# Patient Record
Sex: FEMALE | Race: WHITE | Employment: FULL TIME | URBAN - METROPOLITAN AREA
[De-identification: names, ages, dates, MRNs, and addresses within clinical notes are randomized per-mention and may not be internally consistent; named-entity substitution may affect disease eponyms.]

---

## 2015-09-21 LAB
ALT SERPL-CCNC: 31 U/L (ref 10–70)
AST SERPL-CCNC: 21 U/L (ref 14–46)
CHOLEST SERPL-MCNC: 181 MG/DL (ref 0–200)
CREAT SERPL-MCNC: 0.8 MG/DL (ref 0.6–1.3)
GLUCOSE SERPL-MCNC: 91 MG/DL (ref 70–110)
HBA1C MFR BLD: 5 % (ref 4.8–6)
HDLC SERPL-MCNC: 35 MG/DL (ref 40–60)
LDLC SERPL CALC-MCNC: 113 MG/DL (ref 88–165)
POTASSIUM SERPL-SCNC: 4.1 MMOL/L (ref 3.5–5.1)
TRIGL SERPL-MCNC: 166 MG/DL
TSH SERPL-ACNC: 2.01 UIU/ML (ref 0.47–4.68)

## 2017-01-19 ENCOUNTER — TRANSFERRED RECORDS (OUTPATIENT)
Dept: HEALTH INFORMATION MANAGEMENT | Facility: CLINIC | Age: 42
End: 2017-01-19

## 2017-09-20 ENCOUNTER — TRANSFERRED RECORDS (OUTPATIENT)
Dept: HEALTH INFORMATION MANAGEMENT | Facility: CLINIC | Age: 42
End: 2017-09-20

## 2017-09-20 LAB
HPV ABSTRACT: NORMAL
PAP-ABSTRACT: NORMAL

## 2018-05-02 ENCOUNTER — APPOINTMENT (OUTPATIENT)
Dept: GENERAL RADIOLOGY | Facility: HOSPITAL | Age: 43
End: 2018-05-02
Attending: PHYSICIAN ASSISTANT
Payer: COMMERCIAL

## 2018-05-02 ENCOUNTER — HOSPITAL ENCOUNTER (EMERGENCY)
Facility: HOSPITAL | Age: 43
Discharge: HOME | End: 2018-05-02
Attending: PHYSICIAN ASSISTANT | Admitting: PHYSICIAN ASSISTANT
Payer: COMMERCIAL

## 2018-05-02 VITALS
RESPIRATION RATE: 16 BRPM | SYSTOLIC BLOOD PRESSURE: 121 MMHG | OXYGEN SATURATION: 97 % | TEMPERATURE: 97.8 F | DIASTOLIC BLOOD PRESSURE: 84 MMHG

## 2018-05-02 DIAGNOSIS — Z72.0 TOBACCO ABUSE: ICD-10-CM

## 2018-05-02 DIAGNOSIS — R07.89 ATYPICAL CHEST PAIN: ICD-10-CM

## 2018-05-02 LAB
ALBUMIN SERPL-MCNC: 3.8 G/DL (ref 3.4–5)
ALP SERPL-CCNC: 90 U/L (ref 40–150)
ALT SERPL W P-5'-P-CCNC: 36 U/L (ref 0–50)
ANION GAP SERPL CALCULATED.3IONS-SCNC: 7 MMOL/L (ref 3–14)
AST SERPL W P-5'-P-CCNC: 20 U/L (ref 0–45)
BASOPHILS # BLD AUTO: 0.1 10E9/L (ref 0–0.2)
BASOPHILS NFR BLD AUTO: 0.9 %
BILIRUB SERPL-MCNC: 0.5 MG/DL (ref 0.2–1.3)
BUN SERPL-MCNC: 13 MG/DL (ref 7–30)
CALCIUM SERPL-MCNC: 8.7 MG/DL (ref 8.5–10.1)
CHLORIDE SERPL-SCNC: 107 MMOL/L (ref 94–109)
CO2 SERPL-SCNC: 25 MMOL/L (ref 20–32)
CREAT SERPL-MCNC: 0.72 MG/DL (ref 0.52–1.04)
D DIMER PPP DDU-MCNC: 218 NG/ML D-DU (ref 0–300)
DIFFERENTIAL METHOD BLD: NORMAL
EOSINOPHIL # BLD AUTO: 0.1 10E9/L (ref 0–0.7)
EOSINOPHIL NFR BLD AUTO: 2 %
ERYTHROCYTE [DISTWIDTH] IN BLOOD BY AUTOMATED COUNT: 13 % (ref 10–15)
GFR SERPL CREATININE-BSD FRML MDRD: 88 ML/MIN/1.7M2
GLUCOSE SERPL-MCNC: 99 MG/DL (ref 70–99)
HCT VFR BLD AUTO: 41 % (ref 35–47)
HGB BLD-MCNC: 14.1 G/DL (ref 11.7–15.7)
IMM GRANULOCYTES # BLD: 0 10E9/L (ref 0–0.4)
IMM GRANULOCYTES NFR BLD: 0.2 %
LIPASE SERPL-CCNC: 147 U/L (ref 73–393)
LYMPHOCYTES # BLD AUTO: 2.2 10E9/L (ref 0.8–5.3)
LYMPHOCYTES NFR BLD AUTO: 34.1 %
MCH RBC QN AUTO: 32.9 PG (ref 26.5–33)
MCHC RBC AUTO-ENTMCNC: 34.4 G/DL (ref 31.5–36.5)
MCV RBC AUTO: 96 FL (ref 78–100)
MONOCYTES # BLD AUTO: 0.5 10E9/L (ref 0–1.3)
MONOCYTES NFR BLD AUTO: 7.9 %
NEUTROPHILS # BLD AUTO: 3.5 10E9/L (ref 1.6–8.3)
NEUTROPHILS NFR BLD AUTO: 54.9 %
NRBC # BLD AUTO: 0 10*3/UL
NRBC BLD AUTO-RTO: 0 /100
PLATELET # BLD AUTO: 256 10E9/L (ref 150–450)
POTASSIUM SERPL-SCNC: 3.8 MMOL/L (ref 3.4–5.3)
PROT SERPL-MCNC: 7.7 G/DL (ref 6.8–8.8)
RBC # BLD AUTO: 4.29 10E12/L (ref 3.8–5.2)
SODIUM SERPL-SCNC: 139 MMOL/L (ref 133–144)
TROPONIN I SERPL-MCNC: <0.015 UG/L (ref 0–0.04)
WBC # BLD AUTO: 6.4 10E9/L (ref 4–11)

## 2018-05-02 PROCEDURE — 85025 COMPLETE CBC W/AUTO DIFF WBC: CPT | Performed by: PHYSICIAN ASSISTANT

## 2018-05-02 PROCEDURE — 25000132 ZZH RX MED GY IP 250 OP 250 PS 637: Performed by: PHYSICIAN ASSISTANT

## 2018-05-02 PROCEDURE — 85379 FIBRIN DEGRADATION QUANT: CPT | Performed by: PHYSICIAN ASSISTANT

## 2018-05-02 PROCEDURE — 99284 EMERGENCY DEPT VISIT MOD MDM: CPT | Performed by: PHYSICIAN ASSISTANT

## 2018-05-02 PROCEDURE — 71046 X-RAY EXAM CHEST 2 VIEWS: CPT | Mod: TC

## 2018-05-02 PROCEDURE — 83690 ASSAY OF LIPASE: CPT | Performed by: PHYSICIAN ASSISTANT

## 2018-05-02 PROCEDURE — 80053 COMPREHEN METABOLIC PANEL: CPT | Performed by: PHYSICIAN ASSISTANT

## 2018-05-02 PROCEDURE — 93005 ELECTROCARDIOGRAM TRACING: CPT

## 2018-05-02 PROCEDURE — 99285 EMERGENCY DEPT VISIT HI MDM: CPT | Mod: 25

## 2018-05-02 PROCEDURE — 36415 COLL VENOUS BLD VENIPUNCTURE: CPT | Performed by: PHYSICIAN ASSISTANT

## 2018-05-02 PROCEDURE — 93010 ELECTROCARDIOGRAM REPORT: CPT | Performed by: INTERNAL MEDICINE

## 2018-05-02 PROCEDURE — 25000125 ZZHC RX 250: Performed by: PHYSICIAN ASSISTANT

## 2018-05-02 PROCEDURE — 84484 ASSAY OF TROPONIN QUANT: CPT | Performed by: PHYSICIAN ASSISTANT

## 2018-05-02 RX ORDER — PHENOBARBITAL, HYOSCYAMINE SULFATE, ATROPINE SULFATE, SCOPOLAMINE HYDROBROMIDE .0194; .1037; 16.2; .0065 MG/5ML; MG/5ML; MG/5ML; MG/5ML
5 ELIXIR ORAL ONCE
Status: COMPLETED | OUTPATIENT
Start: 2018-05-02 | End: 2018-05-02

## 2018-05-02 RX ADMIN — PHENOBARBITAL, HYOSCYAMINE SULFATE, ATROPINE SULFATE, SCOPOLAMINE HYDROBROMIDE 16.2 MG: 16.2; .1037; .0194; .0065 ELIXIR ORAL at 13:10

## 2018-05-02 RX ADMIN — LIDOCAINE HYDROCHLORIDE 30 ML: 20 SOLUTION ORAL; TOPICAL at 13:10

## 2018-05-02 ASSESSMENT — ENCOUNTER SYMPTOMS
ABDOMINAL PAIN: 0
NECK PAIN: 0
BACK PAIN: 1
VOMITING: 0
CHILLS: 0
CHEST TIGHTNESS: 1
PALPITATIONS: 0
BRUISES/BLEEDS EASILY: 0
LIGHT-HEADEDNESS: 0
FATIGUE: 0
DIZZINESS: 0
NAUSEA: 0
COUGH: 0
FEVER: 0
SHORTNESS OF BREATH: 1
HEADACHES: 0

## 2018-05-02 NOTE — ED AVS SNAPSHOT
[facility]    [address] [address]    Phone:  [phone]                                       Lashae Caicedo   MRN: [id number]    Department:  [facility]   Date of Visit:  5/2/2018           Patient Information     Date Of Birth          1975        Your diagnoses for this visit were:     Atypical chest pain     Tobacco abuse        You were seen by Christy Black PA-C.      Follow-up Information     Follow up with Melyssa Lewis MD In 2 days.    Specialty:  Family Practice    Why:  at 8:45 am    Contact information:    [address]  [facility] [address] [phone]          Follow up with [facility].    Specialty:  EMERGENCY MEDICINE    Why:  If symptoms worsen    Contact information:    [address]  [facility] [address] [phone]    Additional information:    From [facility]: Take [address]. Turn left at [address]. Turn left at the first stoplight on [address]. At the first stop sign, take a right onto [address]. Take a left into the parking lot and continue through until you reach the North enterance of the building.       From [place]: Take [address]. Take the [address] ramp towards [place]. Turn left onto [address]. Take a slight right onto [address]. Turn left at the first stoplight on [address]. At the first stop sign, take a right onto [address]. Take a left into the parking lot and continue through until you reach the North enterance of the building.       From [place]: Take [address]. Take a right at [address]. At the first stop sign, take a right onto [address]. Take a left into the parking lot and continue through until you reach the North enterance of the building.         Discharge Instructions       Please follow-up with DR. Lewis on Friday 5/4 at 8:15 am here at [facility].     Stop smoking.     OTC  "pain medications as needed.     Return here for any worsening symptoms or other concerns.     Discharge References/Attachments     CHEST PAIN, UNCERTAIN CAUSE (ENGLISH)      Your next 10 appointments already scheduled     May 04, 2018  8:30 AM CDT   (Arrive by 8:15 AM)   Office Visit with Melyssa Lewis MD   [facility] ([facility] )    [address] [place] [address]   [phone]           Bring a current list of meds and any records pertaining to this visit. For Physicals, please bring immunization records and any forms needing to be filled out. Please arrive 10 minutes early to complete paperwork.                 Review of your medicines      Notice     You have not been prescribed any medications.            Procedures and tests performed during your visit     CBC with platelets differential    Comprehensive metabolic panel    D-Dimer ([facility])    EKG 12-lead, tracing only    Lipase    Peripheral IV catheter    Troponin I    XR Chest 2 Views      Orders Needing Specimen Collection     None      Pending Results     No orders found from 4/30/2018 to 5/3/2018.            Pending Culture Results     No orders found from 4/30/2018 to 5/3/2018.            Thank you for choosing [facility]       Thank you for choosing [facility] for your care. Our goal is always to provide you with excellent care. Hearing back from our patients is one way we can continue to improve our services. Please take a few minutes to complete the written survey that you may receive in the mail after you visit with us. Thank you!        [employer] Information     [facility] lets you send messages to your doctor, view your test results, renew your prescriptions, schedule appointments and more. To sign up, go to [email or web address] . Click on \"Log in\" on the left side of the screen, which will take you to the Welcome page. Then click on \"Sign up Now\" on the right side of the page.     You will be asked to enter " the access code listed below, as well as some personal information. Please follow the directions to create your username and password.     Your access code is: [email or web address]  Expires: 2018  1:53 PM     Your access code will  in 90 days. If you need help or a new code, please call your [facility] clinic or [phone].        Care EveryWhere ID     This is your Care EveryWhere ID. This could be used by other organizations to access your [facility] medical records  [id number]        Equal Access to Services     [facility]: Hadlise Mendez, madison lynch, tarah hernandezalmati pike, brissa marlow . So [facility] [phone].    ATENCIÓN: Si habla español, tiene a crystal disposición servicios gratuitos de asistencia lingüística. Llame al [phone].    We comply with applicable federal civil rights laws and [place] laws. We do not discriminate on the basis of race, color, national origin, age, disability, sex, sexual orientation, or gender identity.            After Visit Summary       This is your record. Keep this with you and show to your community pharmacist(s) and doctor(s) at your next visit.

## 2018-05-02 NOTE — ED AVS SNAPSHOT
[facility]    [address] [address]    Phone:  [phone]                                       Lashae Caicedo   MRN: [id number]    Department:  [facility]   Date of Visit:  5/2/2018           After Visit Summary Signature Page     I have received my discharge instructions, and my questions have been answered. I have discussed any challenges I see with this plan with the nurse or doctor.    ..........................................................................................................................................  Patient/Patient Representative Signature      ..........................................................................................................................................  Patient Representative Print Name and Relationship to Patient    ..................................................               ................................................  Date                                            Time    ..........................................................................................................................................  Reviewed by Signature/Title    ...................................................              ..............................................  Date                                                            Time

## 2018-05-02 NOTE — ED PROVIDER NOTES
History     Chief Complaint   Patient presents with     Chest Pain     c/o chest pain x 4 weeks, notes worse when laying down     The history is provided by the patient.     Lashae Caicedo is a 42 year old female who presented to the emergency department ambulatory for evaluation of a 4 week history of substernal chest pain.  Pain is described as sharp and dull.  His present daily, worse with lying flat, and worse with deep breath.  She does have some minimal associated dyspnea randomly with the pain as well as reported intermittent diaphoresis.  No cough.  Denies any abdominal pain.  Denies any vomiting.  She does smoke.  She has no history of coronary artery disease.  Current pain rated approximately 7 out of 10 scale. Pain radiates to the back.     Problem List:    There are no active problems to display for this patient.       Past Medical History:    No past medical history on file.    Past Surgical History:    No past surgical history on file.    Family History:    No family history on file.    Social History:  Marital Status:    Social History   Substance Use Topics     Smoking status: Not on file     Smokeless tobacco: Not on file     Alcohol use Not on file        Medications:      No current outpatient prescriptions on file.      Review of Systems   Constitutional: Negative for chills, fatigue and fever.   Respiratory: Positive for chest tightness and shortness of breath. Negative for cough.    Cardiovascular: Positive for chest pain. Negative for palpitations.   Gastrointestinal: Negative for abdominal pain, nausea and vomiting.   Genitourinary: Negative.    Musculoskeletal: Positive for back pain. Negative for neck pain.   Skin: Negative.    Neurological: Negative for dizziness, light-headedness and headaches.   Hematological: Does not bruise/bleed easily.       Physical Exam   BP: (!) 176/106  Heart Rate: 93  Temp: 97.9  F (36.6  C)  Resp: 14  SpO2: 98 %      Physical Exam   Constitutional: She is  oriented to person, place, and time. She appears well-developed and well-nourished.   Cardiovascular: Normal rate.    Pulmonary/Chest: Effort normal and breath sounds normal. No respiratory distress.   Abdominal: Soft. There is no tenderness. There is no guarding.   Musculoskeletal: She exhibits no edema.   Neurological: She is alert and oriented to person, place, and time.   Skin: Skin is warm and dry.   Psychiatric: She has a normal mood and affect.   Nursing note and vitals reviewed.      ED Course     ED Course     Procedures          EKG shows a normal sinus rhythm at a rate of 85.  Normal MT interval.  Normal QRS duration.  Normal QTC.  Normal axis.  Normal P-wave duration.  There are no ST or T-wave concerns.  She does have a very mild nonspecific RSR prime pattern in V1.  No previous EKGs.    Chest x-ray shows no focal infiltrate, pneumothorax, or widened mediastinum.    Critical Care time:  none               Results for orders placed or performed during the hospital encounter of 05/02/18 (from the past 24 hour(s))   CBC with platelets differential   Result Value Ref Range    WBC 6.4 4.0 - 11.0 10e9/L    RBC Count 4.29 3.8 - 5.2 10e12/L    Hemoglobin 14.1 11.7 - 15.7 g/dL    Hematocrit 41.0 35.0 - 47.0 %    MCV 96 78 - 100 fl    MCH 32.9 26.5 - 33.0 pg    MCHC 34.4 31.5 - 36.5 g/dL    RDW 13.0 10.0 - 15.0 %    Platelet Count 256 150 - 450 10e9/L    Diff Method Automated Method     % Neutrophils 54.9 %    % Lymphocytes 34.1 %    % Monocytes 7.9 %    % Eosinophils 2.0 %    % Basophils 0.9 %    % Immature Granulocytes 0.2 %    Nucleated RBCs 0 0 /100    Absolute Neutrophil 3.5 1.6 - 8.3 10e9/L    Absolute Lymphocytes 2.2 0.8 - 5.3 10e9/L    Absolute Monocytes 0.5 0.0 - 1.3 10e9/L    Absolute Eosinophils 0.1 0.0 - 0.7 10e9/L    Absolute Basophils 0.1 0.0 - 0.2 10e9/L    Abs Immature Granulocytes 0.0 0 - 0.4 10e9/L    Absolute Nucleated RBC 0.0    Comprehensive metabolic panel   Result Value Ref Range    Sodium  139 133 - 144 mmol/L    Potassium 3.8 3.4 - 5.3 mmol/L    Chloride 107 94 - 109 mmol/L    Carbon Dioxide 25 20 - 32 mmol/L    Anion Gap 7 3 - 14 mmol/L    Glucose 99 70 - 99 mg/dL    Urea Nitrogen 13 7 - 30 mg/dL    Creatinine 0.72 0.52 - 1.04 mg/dL    GFR Estimate 88 >60 mL/min/1.7m2    GFR Estimate If Black >90 >60 mL/min/1.7m2    Calcium 8.7 8.5 - 10.1 mg/dL    Bilirubin Total 0.5 0.2 - 1.3 mg/dL    Albumin 3.8 3.4 - 5.0 g/dL    Protein Total 7.7 6.8 - 8.8 g/dL    Alkaline Phosphatase 90 40 - 150 U/L    ALT 36 0 - 50 U/L    AST 20 0 - 45 U/L   Lipase   Result Value Ref Range    Lipase 147 73 - 393 U/L   Troponin I   Result Value Ref Range    Troponin I ES <0.015 0.000 - 0.045 ug/L   D-Dimer ([facility])   Result Value Ref Range    D-Dimer ng/mL 218 0 - 300 ng/ml D-DU   XR Chest 2 Views    Narrative    PROCEDURE:  XR CHEST 2 VW    HISTORY:  chest pain; .     COMPARISON:  None.    FINDINGS:   The cardiac silhouette is normal in size. The pulmonary vasculature is  normal.  The lungs are clear. No pleural effusion or pneumothorax.      Impression    IMPRESSION:  No acute cardiopulmonary disease.      CHEN CAMARA MD       Medications   lidocaine (viscous) (XYLOCAINE) 2 % 15 mL, alum & mag hydroxide-simethicone (MYLANTA ES/MAALOX  ES) 15 mL GI Cocktail (30 mLs Oral Given 5/2/18 1310)   atropine-PHENobarbital-scopolamine-hyoscyamine (DONNATAL) 16.2 MG/5ML solution 16.2 mg (16.2 mg Oral Given 5/2/18 1310)       HEART Score  Background  Calculates the overall risk of adverse event in patient's presenting with chest pain.  Based on 5 criteria (each assigned 0-2 points) including suspiciousness of history, EKG, age, risk factors and troponin.    Data  42 year old female   does not have a problem list on file.   has no tobacco history on file.  family history is not on file.  Lab Results   Component Value Date    TROPI <0.015 05/02/2018     Criteria   0-2 points for each of 5 items (maximum of 10 points):  Score 0- History  slightly suspicious for coronary syndrome  Score 0- EKG Normal  Score 0- Age <45 years old  Score 1- One to 2 risk factors for atherosclerotic disease  Score 0- Within normal limits for troponin levels  Interpretation  Risk of adverse outcome  Heart Score: 1  Total Score 0-3- Adverse Outcome Risk 2.5% - Supports early discharge with appropriate follow-up      Assessments & Plan (with Medical Decision Making)   Pain is quite atypical in nature.  It is been present for approximately 4 weeks.  Present on a daily basis.  Worse with lying flat.  Varies between sharp and dull.  I do not believe she has any significant associated signs or symptoms.  EKG does have a slight abnormality in V1, but I believe it is nonspecific for this patient it does not represent acute coronary syndrome.  I do not believe that her pain represents any emergent intrathoracic catastrophe at this time.  She was set up for a recheck as well as establish care in the clinic in 2 days.  I stressed the importance of returning to the emergency department for any worsening pain, pain that changes in quality location or character, appreciable dyspnea, or any other questions or concerns.  OTC pain medications.  She has what sounds to be an anaphylactoid reaction to aspirin.  Stressed smoking cessation.           I have reviewed the nursing notes.    I have reviewed the findings, diagnosis, plan and need for follow up with the patient.       New Prescriptions    No medications on file       Final diagnoses:   Atypical chest pain   Tobacco abuse       5/2/2018   [facility]     Christy Black PA-C  05/02/18 7010

## 2018-05-02 NOTE — DISCHARGE INSTRUCTIONS
Please follow-up with DR. Lewis on Friday 5/4 at 8:15 am here at [facility].     Stop smoking.     OTC pain medications as needed.     Return here for any worsening symptoms or other concerns.

## 2018-05-02 NOTE — ED NOTES
Co chest pain for 4 weeks worse with lying back sleeping.  Slight SOB hurts to breath deeply.  Pain is 7/10 and sometimes through to back.  EKG in progress

## 2018-05-04 ENCOUNTER — OFFICE VISIT (OUTPATIENT)
Dept: FAMILY MEDICINE | Facility: OTHER | Age: 43
End: 2018-05-04
Attending: FAMILY MEDICINE
Payer: COMMERCIAL

## 2018-05-04 VITALS
TEMPERATURE: 98.5 F | HEART RATE: 96 BPM | SYSTOLIC BLOOD PRESSURE: 116 MMHG | DIASTOLIC BLOOD PRESSURE: 80 MMHG | HEIGHT: 64 IN | OXYGEN SATURATION: 97 % | RESPIRATION RATE: 18 BRPM | WEIGHT: 232 LBS | BODY MASS INDEX: 39.61 KG/M2

## 2018-05-04 DIAGNOSIS — Z71.6 TOBACCO ABUSE COUNSELING: ICD-10-CM

## 2018-05-04 DIAGNOSIS — R10.13 ABDOMINAL PAIN, EPIGASTRIC: ICD-10-CM

## 2018-05-04 DIAGNOSIS — Z72.0 TOBACCO ABUSE: ICD-10-CM

## 2018-05-04 DIAGNOSIS — G43.909 MIGRAINE SYNDROME: ICD-10-CM

## 2018-05-04 DIAGNOSIS — K52.9 CHRONIC DIARRHEA: Primary | ICD-10-CM

## 2018-05-04 DIAGNOSIS — R68.81 EARLY SATIETY: ICD-10-CM

## 2018-05-04 PROBLEM — N93.9 ABNORMAL UTERINE BLEEDING: Status: ACTIVE | Noted: 2017-09-21

## 2018-05-04 PROBLEM — E66.812 OBESITY, CLASS II, BMI 35-39.9: Status: ACTIVE | Noted: 2017-03-20

## 2018-05-04 PROBLEM — Z88.1 HX OF ANTIBIOTIC ALLERGY: Status: ACTIVE | Noted: 2017-03-20

## 2018-05-04 PROBLEM — M26.609 TMJ (TEMPOROMANDIBULAR JOINT SYNDROME): Status: ACTIVE | Noted: 2017-03-20

## 2018-05-04 PROBLEM — F33.9 DEPRESSION, RECURRENT (H): Status: ACTIVE | Noted: 2017-10-25

## 2018-05-04 PROCEDURE — 99203 OFFICE O/P NEW LOW 30 MIN: CPT | Performed by: FAMILY MEDICINE

## 2018-05-04 PROCEDURE — G0463 HOSPITAL OUTPT CLINIC VISIT: HCPCS

## 2018-05-04 RX ORDER — VENLAFAXINE 37.5 MG/1
37.5 TABLET ORAL
COMMUNITY
Start: 2017-10-25 | End: 2018-06-22

## 2018-05-04 RX ORDER — PROPRANOLOL HYDROCHLORIDE 20 MG/1
20 TABLET ORAL
COMMUNITY
Start: 2017-09-12 | End: 2018-06-22

## 2018-05-04 RX ORDER — EPINEPHRINE 0.3 MG/.3ML
0.3 INJECTION SUBCUTANEOUS
COMMUNITY
Start: 2017-07-26 | End: 2018-08-10

## 2018-05-04 RX ORDER — IBUPROFEN 600 MG/1
600 TABLET, FILM COATED ORAL
COMMUNITY
Start: 2017-10-27 | End: 2018-07-26

## 2018-05-04 RX ORDER — ALBUTEROL SULFATE 90 UG/1
2 AEROSOL, METERED RESPIRATORY (INHALATION)
COMMUNITY
Start: 2017-07-26 | End: 2018-08-10

## 2018-05-04 ASSESSMENT — ANXIETY QUESTIONNAIRES
4. TROUBLE RELAXING: NEARLY EVERY DAY
3. WORRYING TOO MUCH ABOUT DIFFERENT THINGS: NEARLY EVERY DAY
IF YOU CHECKED OFF ANY PROBLEMS ON THIS QUESTIONNAIRE, HOW DIFFICULT HAVE THESE PROBLEMS MADE IT FOR YOU TO DO YOUR WORK, TAKE CARE OF THINGS AT HOME, OR GET ALONG WITH OTHER PEOPLE: SOMEWHAT DIFFICULT
2. NOT BEING ABLE TO STOP OR CONTROL WORRYING: NEARLY EVERY DAY
5. BEING SO RESTLESS THAT IT IS HARD TO SIT STILL: SEVERAL DAYS
GAD7 TOTAL SCORE: 14
6. BECOMING EASILY ANNOYED OR IRRITABLE: MORE THAN HALF THE DAYS
1. FEELING NERVOUS, ANXIOUS, OR ON EDGE: MORE THAN HALF THE DAYS
7. FEELING AFRAID AS IF SOMETHING AWFUL MIGHT HAPPEN: NOT AT ALL

## 2018-05-04 ASSESSMENT — PAIN SCALES - GENERAL: PAINLEVEL: MILD PAIN (3)

## 2018-05-04 NOTE — NURSING NOTE
"/80  Pulse 96  Temp 98.5  F (36.9  C) (Tympanic)  Resp 18  Ht 5' 4\" (1.626 m)  Wt 232 lb (105.2 kg)  SpO2 97%  BMI 39.82 kg/m2  "

## 2018-05-04 NOTE — MR AVS SNAPSHOT
After Visit Summary   5/4/2018    Lashae Caicedo    MRN: [id number]           Patient Information     Date Of Birth          1975        Visit Information        Provider Department      5/4/2018 8:30 AM Melyssa Lewis MD [facility]        Today's Diagnoses     Chronic diarrhea    -  1    Abdominal pain, epigastric        Early satiety        Migraine syndrome        Tobacco abuse counseling        Tobacco abuse          Care Instructions      HOW TO QUIT SMOKING  Smoking is one of the hardest habits to break. About half of all those who have ever smoked have been able to quit, and most of those (about 70%) who still smoke want to quit. Here are some of the best ways to stop smoking.     KEEP TRYING:  It takes most smokers about 8 tries before they are finally able to fully quit. So, the more often you try and fail, the better your chance of quitting the next time! So, don't give up!    GO COLD TURKEY:  Most ex-smokers quit cold turkey. Trying to cut back gradually doesn't seem to work as well, perhaps because it continues the smoking habit. Also, it is possible to fool yourself by inhaling more while smoking fewer cigarettes. This results in the same amount of nicotine in your body!    GET SUPPORT:  Support programs can make an important difference, especially for the heavy smoker. These groups offer lectures, methods to change your behavior and peer support. Call the free national Quitline for more information. 800-QUIT-NOW (621-005-6355). Low-cost or free programs are offered by many hospitals, local chapters of the American Lung Association (499-461-8013) and the American Cancer Society (816-946-1568). Support at home is important too. Non-smokers can help by offering praise and encouragement. If the smoker fails to quit, encourage them to try again!    OVER-THE-COUNTER MEDICINES:  For those who can't quit on their own, Nicotine Replacement Therapy (NRT) may make quitting much  easier. Certain aids such as the nicotine patch, gum and lozenge are available without a prescription. However, it is best to use these under the guidance of your doctor. The skin patch provides a steady supply of nicotine to the body. Nicotine gum and lozenge gives temporary bursts of low levels of nicotine. Both methods take the edge off the craving for cigarettes. WARNING: If you feel symptoms of nicotine overdose, such as nausea, vomiting, dizziness, weakness, or fast heartbeat, stop using these and see your doctor.    PRESCRIPTION MEDICINES:  After evaluating your smoking patterns and prior attempts at quitting, your doctor may offer a prescription medicine such as bupropion (Zyban, Wellbutrin), varenicline (Chantix, Champix), a niocotine inhaler or nasal spray. Each has its unique advantage and side effects which your doctor can review with you.    HEALTH BENEFITS OF QUITTING:  The benefits of quitting start right away and keep improving the longer you go without smokin minutes: blood pressure and pulse return to normal  8 hours: oxygen levels return to normal  2 days: ability to smell and taste begins to improve as damaged nerves start to regrow  2-3 weeks: circulation and lung function improves  1-9 months: decreased cough, congestion and shortness of breath; less tired  1 year: risk of heart attack decreases by half  5 years: risk of lung cancer decreases by half; risk of stroke becomes the same as a non-smoker  For information about how to quit smoking, visit the following links:  National Cancer [place] ,   Clearing the Air, Quit Smoking Today   - an online booklet. http://www.smokefree.gov/pubs/clearing_the_air.pdf  Smokefree.gov http://smokefree.gov/  QuitNet http://www.quitnet.com/    5647-6435 Shaun Souza, [address] [place] [address], [place] [address]. All rights reserved. This information is not intended as a substitute for professional medical care. Always follow your healthcare  professional's instructions.    The Benefits of Living Smoke Free  What do you want to gain from quitting? Check off some reasons to quit.  Health Benefits  ___ Reduce my risk of lung cancer, heart disease, chronic lung disease  ___ Have fewer wrinkles and softer skin  ___ Improve my sense of taste and smell  ___ For pregnant women--reduce the risk of having a miscarriage, stillbirth, premature birth, or low-birth-weight baby  Personal Benefits  ___ Feel more in control of my life  ___ Have better-smelling hair, breath, clothes, home, and car  ___ Save time by not having to take smoke breaks, buy cigarettes, or hunt for a light  ___ Have whiter teeth  Family Benefits  ___ Reduce my children s respiratory tract infections  ___ Set a good example for my children  ___ Reduce my family s cancer risk  Financial Benefits  ___ Save hundreds of dollars each year that would be spent on cigarettes  ___ Save money on medical bills  ___ Save on life, health, and car insurance premiums    Those Dollars Add Up!  Cigarettes are expensive, and getting more expensive all the time. Do you realize how much money you are spending on cigarettes per year? What is the average amount you spend on a pack of cigarettes? What is the average number of packs that you smoke per day? Using your answers to these questions, fill in this formula to help you find out:  ($ _____ per pack) ×  ( _____ number of packs per day) × (365 days) =  $ _____ yearly cost of smoking  Besides tobacco, there are other costs, including extra cleaning bills and replacement costs for clothing and furniture; medical expenses for smoking-related illnesses; and higher health, life, and car insurance premiums.    Cigars and Pipes Count Too!  Cigars and pipes are also dangerous. So are smokeless (chewing) tobacco and snuff. All of these products contain nicotine, a highly addictive substance that has harmful effects on your body. Quitting smoking means giving up all tobacco  "products.      8012-7738 [facility], [address] [place] [address], [place] [address]. All rights reserved. This information is not intended as a substitute for professional medical care. Always follow your healthcare professional's instructions.          Follow-ups after your visit        Your next 10 appointments already scheduled     May 18, 2018  4:30 PM CDT   (Arrive by 4:15 PM)   Office Visit with Melyssa Lewis MD   [facility] ([facility] )    [address]  [facility] [address]   [phone]           Bring a current list of meds and any records pertaining to this visit. For Physicals, please bring immunization records and any forms needing to be filled out. Please arrive 10 minutes early to complete paperwork.              Future tests that were ordered for you today     Open Future Orders        Priority Expected Expires Ordered    CT Abdomen Pelvis w Contrast Routine  5/4/2019 5/4/2018            Who to contact     If you have questions or need follow up information about today's clinic visit or your schedule please contact [facility] directly at [phone].  Normal or non-critical lab and imaging results will be communicated to you by MyChart, letter or phone within 4 business days after the clinic has received the results. If you do not hear from us within 7 days, please contact the clinic through [employer] or phone. If you have a critical or abnormal lab result, we will notify you by phone as soon as possible.  Submit refill requests through [employer] or call your pharmacy and they will forward the refill request to us. Please allow 3 business days for your refill to be completed.          Additional Information About Your Visit        MyChart Information     [employer] lets you send messages to your doctor, view your test results, renew your prescriptions, schedule appointments and more. To sign up, go to [email or web address] . Click on \"Log in\" on the " "left side of the screen, which will take you to the Welcome page. Then click on \"Sign up Now\" on the right side of the page.     You will be asked to enter the access code listed below, as well as some personal information. Please follow the directions to create your username and password.     Your access code is: [email or web address]  Expires: 2018  1:53 PM     Your access code will  in 90 days. If you need help or a new code, please call your [facility] or [phone].        Care EveryWhere ID     This is your Care EveryWhere ID. This could be used by other organizations to access your [place] medical records  [id number]        Your Vitals Were     Pulse Temperature Respirations Height Pulse Oximetry BMI (Body Mass Index)    96 98.5  F (36.9  C) (Tympanic) 18 5' 4\" (1.626 m) 97% 39.82 kg/m2       Blood Pressure from Last 3 Encounters:   18 116/80   18 121/84    Weight from Last 3 Encounters:   18 232 lb (105.2 kg)              We Performed the Following     TDAP VACCINE (ADACEL)     Tobacco Cessation - Order to Satisfy Health Maintenance          Today's Medication Changes          These changes are accurate as of 18  9:05 AM.  If you have any questions, ask your nurse or doctor.               Start taking these medicines.        Dose/Directions    omeprazole 20 MG CR capsule   Commonly known as:  priLOSEC   Used for:  Early satiety, Abdominal pain, epigastric   Started by:  Melyssa Lewis MD        Dose:  20 mg   Take 1 capsule (20 mg) by mouth 2 times daily   Quantity:  90 capsule   Refills:  0            Where to get your medicines      These medications were sent to [facility] - [place], [place] - [address]  [id number] , [place] [address]     Phone:  [phone]     omeprazole 20 MG CR capsule                Primary Care Provider Fax #    Physician No Ref-Primary [phone]       No address on file        Equal Access to Services     SHANTHI BURTON AH: Lillian aad " jason Mendez, washobhada ysabeladaha, qaybta kaerrol pike, brissa marsin hayaajaswant lechugataras jarenreremckayla marlow magdalena. So [facility] [phone].    ATENCIÓN: Si georgela carlos enrique, tiene a crystal disposición servicios gratuitos de asistencia lingüística. Elizabeth al [phone].    We comply with applicable federal civil rights laws and [place] laws. We do not discriminate on the basis of race, color, national origin, age, disability, sex, sexual orientation, or gender identity.            Thank you!     Thank you for choosing [facility]  for your care. Our goal is always to provide you with excellent care. Hearing back from our patients is one way we can continue to improve our services. Please take a few minutes to complete the written survey that you may receive in the mail after your visit with us. Thank you!             Your Updated Medication List - Protect others around you: Learn how to safely use, store and throw away your medicines at www.disposemymeds.org.          This list is accurate as of 5/4/18  9:05 AM.  Always use your most recent med list.                   Brand Name Dispense Instructions for use Diagnosis    albuterol 108 (90 Base) MCG/ACT Inhaler    PROAIR HFA/PROVENTIL HFA/VENTOLIN HFA     Inhale 2 puffs into the lungs        EPINEPHrine 0.3 MG/0.3ML injection 2-pack    EPIPEN/ADRENACLICK/or ANY BX GENERIC EQUIV     Inject 0.3 mg into the muscle        ibuprofen 600 MG tablet    ADVIL/MOTRIN     Take 600 mg by mouth        omeprazole 20 MG CR capsule    priLOSEC    90 capsule    Take 1 capsule (20 mg) by mouth 2 times daily    Early satiety, Abdominal pain, epigastric       propranolol 20 MG tablet    INDERAL     Take 20 mg by mouth        venlafaxine 37.5 MG tablet    EFFEXOR     Take 37.5 mg by mouth

## 2018-05-04 NOTE — PROGRESS NOTES
SUBJECTIVE:   Lashae Caicedo is a 42 year old female who presents to clinic today for the following health issues:      New Patient/Transfer of Care  Pt previously seen in [place], [place]. Has history of Migraines, AUB s/p ablation, depression. Recently moved to the area to be close her fiance.     Chest Pain      Onset: 4 1/2 weeks    Description (location/character/radiation/duration): dull and sharp pains varies    Intensity:  moderate    Accompanying signs and symptoms:        Shortness of breath: YES- sometimes       Sweating: no        Nausea/vomitting: no        Palpitations: no        Other (fevers/chills/cough/heartburn/lightheadedness): YES    History (similar episodes/previous evaluation): edema in hands especially.  Worse when laying down    Precipitating or alleviating factors:       Worse with exertion: no        Worse with breathing: no        Related to eating: no        Better with burping: no     Therapies tried and outcome: ER 5-2-2018 wit GI cocktail.  Relates no appetite since that visit    Pt notes epigastric pain radiating to the mid back for the last 4 weeks or so. Unclear how she first noted it. Some days is constant ache, others is intermittent. Bending or laying down occasionally make it worse and sharp. No relationship to food.  Has history of GERD and this feels very different. Taking ranitidine currently bid which helps some, but previously had prilosec which helped better. Has not had weight loss. Appetite is low, but this just started after ED visit. Has been feeling bloating with early satiety for some time. Does also have a history of chronic diarrhea. This is primarily triggered after eating. She has had colonoscopy x 2 with several polyps. She does not know when to follow these up. Last was in 2012.  No blood in stool. In ED lfts were wnl. Cardiac work up including ekg, toponin, cxr ddimer were all negative.     Problem list and histories reviewed & adjusted, as  "indicated.  Additional history: as documented    Labs reviewed in EPIC    Reviewed and updated as needed this visit by clinical staff       Reviewed and updated as needed this visit by Provider         ROS:  Constitutional, HEENT, cardiovascular, pulmonary, gi and gu systems are negative, except as otherwise noted.    OBJECTIVE:     /80  Pulse 96  Temp 98.5  F (36.9  C) (Tympanic)  Resp 18  Ht 5' 4\" (1.626 m)  Wt 232 lb (105.2 kg)  SpO2 97%  BMI 39.82 kg/m2  Body mass index is 39.82 kg/(m^2).  GENERAL: healthy, alert and no distress  EYES: Eyes grossly normal to inspection, PERRL and conjunctivae and sclerae normal  HENT: ear canals and TM's normal, nose and mouth without ulcers or lesions  NECK: no adenopathy, no asymmetry, masses, or scars and thyroid normal to palpation  RESP: lungs clear to auscultation - no rales, rhonchi or wheezes  CV: regular rate and rhythm, normal S1 S2, no S3 or S4, no murmur, click or rub, no peripheral edema   ABDOMEN: soft, nontender, no hepatosplenomegaly, no masses and bowel sounds normal  MS: no gross musculoskeletal defects noted    Diagnostic Test Results:  No results found for this or any previous visit (from the past 24 hour(s)).    ASSESSMENT/PLAN:     Abdominal pain, epigastric / Early satiety  Pain radiates to back and is positional. Not similar to gerd symptoms, but these are bothersome currently. Start bid ppi for now. Get Ct of the abdomen. Follow up after testing for re evaluation. Call or be seen for additional symptoms.   - CT Abdomen Pelvis w Contrast; Future  - omeprazole (PRILOSEC) 20 MG CR capsule; Take 1 capsule (20 mg) by mouth 2 times daily  Dispense: 90 capsule; Refill: 0    Chronic diarrhea  Sounds like pt has had fairly extensive work up in the past. ? IBS- D. Will get records.     Migraine syndrome  Wants refill of propranolol. Will call with exact prescription, unclear if LA or immediate acting.     Tobacco abuse counseling / Tobacco " abuse  Would like to quit. Deferred until work up of abd pain completed.     Melyssa Lewis MD  [facility]

## 2018-05-04 NOTE — PATIENT INSTRUCTIONS

## 2018-05-05 ASSESSMENT — PATIENT HEALTH QUESTIONNAIRE - PHQ9: SUM OF ALL RESPONSES TO PHQ QUESTIONS 1-9: 13

## 2018-05-05 ASSESSMENT — ANXIETY QUESTIONNAIRES: GAD7 TOTAL SCORE: 14

## 2018-05-08 ENCOUNTER — HEALTH MAINTENANCE LETTER (OUTPATIENT)
Age: 43
End: 2018-05-08

## 2018-05-08 ENCOUNTER — TELEPHONE (OUTPATIENT)
Dept: FAMILY MEDICINE | Facility: OTHER | Age: 43
End: 2018-05-08

## 2018-05-08 NOTE — TELEPHONE ENCOUNTER
05/08/2018 - received PA request from [facility] for omeprazole.  Submitted thru CMM.  Waiting for response.  Karley Handy, HIS Specialist.

## 2018-05-09 ENCOUNTER — HOSPITAL ENCOUNTER (OUTPATIENT)
Dept: CT IMAGING | Facility: HOSPITAL | Age: 43
Discharge: HOME | End: 2018-05-09
Attending: FAMILY MEDICINE | Admitting: FAMILY MEDICINE
Payer: COMMERCIAL

## 2018-05-09 DIAGNOSIS — R10.13 ABDOMINAL PAIN, EPIGASTRIC: ICD-10-CM

## 2018-05-09 DIAGNOSIS — R68.81 EARLY SATIETY: ICD-10-CM

## 2018-05-09 DIAGNOSIS — K52.9 CHRONIC DIARRHEA: ICD-10-CM

## 2018-05-09 PROCEDURE — 25000128 H RX IP 250 OP 636: Performed by: RADIOLOGY

## 2018-05-09 PROCEDURE — 74177 CT ABD & PELVIS W/CONTRAST: CPT | Mod: TC

## 2018-05-09 RX ORDER — IOPAMIDOL 612 MG/ML
100 INJECTION, SOLUTION INTRAVASCULAR ONCE
Status: COMPLETED | OUTPATIENT
Start: 2018-05-09 | End: 2018-05-09

## 2018-05-09 RX ADMIN — DIATRIZOATE MEGLUMINE AND DIATRIZOATE SODIUM 30 ML: 660; 100 SOLUTION ORAL; RECTAL at 16:36

## 2018-05-09 RX ADMIN — IOPAMIDOL 100 ML: 612 INJECTION, SOLUTION INTRAVENOUS at 16:35

## 2018-05-09 NOTE — TELEPHONE ENCOUNTER
DENIAL - 05/09/2018 - received DENIAL from [facility] for omeprazole.  Denial reason:  Quantity limits - your dose can be made with a lower number of a higher strength - 40 mg - once per day.  Also, your plan limits you to get up to 1 capsule per day for a maximum of 120 days within 365 days.  This applies to all PPI's.  Insurance believes comparable OTC medicines are available.  Patient must buy OTC.  Pharmacy advised.  Forms scanned to Epic.  Karley Handy, HIS Specialist.

## 2018-05-18 ENCOUNTER — OFFICE VISIT (OUTPATIENT)
Dept: FAMILY MEDICINE | Facility: OTHER | Age: 43
End: 2018-05-18
Attending: FAMILY MEDICINE
Payer: COMMERCIAL

## 2018-05-18 VITALS
WEIGHT: 231 LBS | HEART RATE: 77 BPM | OXYGEN SATURATION: 96 % | DIASTOLIC BLOOD PRESSURE: 86 MMHG | TEMPERATURE: 98.4 F | SYSTOLIC BLOOD PRESSURE: 122 MMHG | BODY MASS INDEX: 39.65 KG/M2

## 2018-05-18 DIAGNOSIS — B37.2 CANDIDAL INTERTRIGO: ICD-10-CM

## 2018-05-18 DIAGNOSIS — G89.29 CHRONIC BILATERAL BACK PAIN, UNSPECIFIED BACK LOCATION: ICD-10-CM

## 2018-05-18 DIAGNOSIS — K52.9 CHRONIC DIARRHEA: ICD-10-CM

## 2018-05-18 DIAGNOSIS — R10.13 ABDOMINAL PAIN, EPIGASTRIC: Primary | ICD-10-CM

## 2018-05-18 DIAGNOSIS — M54.9 CHRONIC BILATERAL BACK PAIN, UNSPECIFIED BACK LOCATION: ICD-10-CM

## 2018-05-18 PROCEDURE — G0463 HOSPITAL OUTPT CLINIC VISIT: HCPCS

## 2018-05-18 PROCEDURE — 99213 OFFICE O/P EST LOW 20 MIN: CPT | Performed by: FAMILY MEDICINE

## 2018-05-18 RX ORDER — PANTOPRAZOLE SODIUM 20 MG/1
20 TABLET, DELAYED RELEASE ORAL DAILY
Qty: 60 TABLET | Refills: 0 | Status: SHIPPED | OUTPATIENT
Start: 2018-05-18 | End: 2018-08-10

## 2018-05-18 RX ORDER — NYSTATIN 100000 [USP'U]/G
POWDER TOPICAL 3 TIMES DAILY PRN
Qty: 30 G | Refills: 1 | Status: SHIPPED | OUTPATIENT
Start: 2018-05-18 | End: 2018-08-31

## 2018-05-18 ASSESSMENT — ANXIETY QUESTIONNAIRES
GAD7 TOTAL SCORE: 11
IF YOU CHECKED OFF ANY PROBLEMS ON THIS QUESTIONNAIRE, HOW DIFFICULT HAVE THESE PROBLEMS MADE IT FOR YOU TO DO YOUR WORK, TAKE CARE OF THINGS AT HOME, OR GET ALONG WITH OTHER PEOPLE: SOMEWHAT DIFFICULT
1. FEELING NERVOUS, ANXIOUS, OR ON EDGE: MORE THAN HALF THE DAYS
6. BECOMING EASILY ANNOYED OR IRRITABLE: MORE THAN HALF THE DAYS
7. FEELING AFRAID AS IF SOMETHING AWFUL MIGHT HAPPEN: SEVERAL DAYS
3. WORRYING TOO MUCH ABOUT DIFFERENT THINGS: MORE THAN HALF THE DAYS
5. BEING SO RESTLESS THAT IT IS HARD TO SIT STILL: SEVERAL DAYS
4. TROUBLE RELAXING: MORE THAN HALF THE DAYS
2. NOT BEING ABLE TO STOP OR CONTROL WORRYING: SEVERAL DAYS

## 2018-05-18 ASSESSMENT — PAIN SCALES - GENERAL: PAINLEVEL: MILD PAIN (3)

## 2018-05-18 NOTE — NURSING NOTE
"Chief Complaint   Patient presents with     RECHECK     abdominal pain       Initial /86  Pulse 77  Temp 98.4  F (36.9  C) (Tympanic)  Wt 231 lb (104.8 kg)  SpO2 96%  BMI 39.65 kg/m2 Estimated body mass index is 39.65 kg/(m^2) as calculated from the following:    Height as of 5/4/18: 5' 4\" (1.626 m).    Weight as of this encounter: 231 lb (104.8 kg).  Medication Reconciliation: complete    Rakel Robin MA    "

## 2018-05-18 NOTE — PROGRESS NOTES
SUBJECTIVE:                                                    Lashae Caicedo is a 42 year old female who presents to clinic today for the following health issues:        Chest Pain      Onset: almost 2 months    Description (location/character/radiation/duration): chest    Intensity:  moderate    Accompanying signs and symptoms: Not nearly as bad as it was.        Shortness of breath: YES- a little bit. Is a little better       Sweating: no        Nausea/vomitting: no        Palpitations: no        Other (fevers/chills/cough/heartburn/lightheadedness): YES- epigastric    History (similar episodes/previous evaluation): None    Precipitating or alleviating factors:       Worse with exertion: YES       Worse with breathing: no        Related to eating: no        Better with burping: no     Therapies tried and outcome: None    Feels this is possibly reflux or an issue with her sternum/back. Would like referral to chiropractor and new med as prilosec was not covered. She did start otc prilosec, but only daily. May be helpful. Pain definitely worse with laying flat. Also notes tightness in the neck. Pain started after sleeping at a shelter.     Yeast infection      Duration: a couple months    Description (location/character/radiation): under the folds of the belly    Intensity:  mild    Accompanying signs and symptoms: red rash, very itchy. Sweaty and burns.    History (similar episodes/previous evaluation): yes    Precipitating or alleviating factors: None    Therapies tried and outcome: used to use nystatin powder but is now  and changed color. Would like to see if there is something else       Abdominal Pain      Duration: many years    Description (location/character/radiation):  stomache       Associated flank pain: None    Intensity:  moderate    Accompanying signs and symptoms:        Fever/Chills: no        Gas/Bloating: YES       Nausea/vomitting: no        Diarrhea: YES       Dysuria or Hematuria: no      History (previous similar pain/trauma/previous testing): CT scan came back normal    Precipitating or alleviating factors:       Pain worse with eating/BM/urination: yes with eating. Causes diarrhea?Bm immediately after eating       Pain relieved by BM: YES- sometimes    Therapies tried and outcome: None    LMP:  not applicable     Chronic. Has had significant work up in the past. No records available. CT done at our last visit wnl.     Problem list and histories reviewed & adjusted, as indicated.  Additional history: as documented    Labs reviewed in EPIC    ROS:  Constitutional, HEENT, cardiovascular, pulmonary, gi and gu systems are negative, except as otherwise noted.    OBJECTIVE:     /86  Pulse 77  Temp 98.4  F (36.9  C) (Tympanic)  Wt 231 lb (104.8 kg)  SpO2 96%  BMI 39.65 kg/m2  Body mass index is 39.65 kg/(m^2).  GENERAL: healthy, alert and no distress  NECK: no adenopathy, no asymmetry, masses, or scars and thyroid normal to palpation  RESP: lungs clear to auscultation - no rales, rhonchi or wheezes  CV: regular rate and rhythm, normal S1 S2, no S3 or S4, no murmur, click or rub, no peripheral edema and peripheral pulses strong  ABDOMEN: soft, nontender, no hepatosplenomegaly, no masses and bowel sounds normal  MS: Patient does not the sternal pain when laying flat and taking a deep breath. Also notes the same pain with compression of the rib cage   NEURO: Normal strength and tone, mentation intact and speech normal  SKIN: Mild erythema and maceration under pannus.       Diagnostic Test Results:  No results found for this or any previous visit (from the past 24 hour(s)).    ASSESSMENT/PLAN:     1. Abdominal pain, epigastric  Trial of protonix as prilosec seemed to help some with GERD symptoms and her substernal pain although, feel this may more likely be MSK related to sternum. Chiro referral as below.   - pantoprazole (PROTONIX) 20 MG EC tablet; Take 1 tablet (20 mg) by mouth daily Take by  mouth 30-60 minutes before a meal.  Dispense: 60 tablet; Refill: 0    2. Chronic bilateral back pain, unspecified back location  Low back which is chronic. Previously had good relief with chiropractic care for this, also feel her epigastric symptoms may be related to sternal pain as I could reproduce pain with compression or rib cage.   - CHIROPRACTIC REFERRAL    3. Chronic diarrhea  Advised to obtain records, will make appropriate follow up once these are reviewed. CT of abd normal and reassuring.     4. Candidal intertrigo  Mild, in pannus.   - nystatin (MYCOSTATIN) 015944 UNIT/GM POWD; Apply topically 3 times daily as needed  Dispense: 30 g; Refill: 1    Melyssa Lewis MD  [facility]

## 2018-05-18 NOTE — MR AVS SNAPSHOT
After Visit Summary   5/18/2018    Lashea Caicedo    MRN: [id number]           Patient Information     Date Of Birth          1975        Visit Information        Provider Department      5/18/2018 4:30 PM Melyssa Lewis MD [facility]        Today's Diagnoses     Abdominal pain, epigastric    -  1    Chronic bilateral back pain, unspecified back location        Chronic diarrhea        Candidal intertrigo           Follow-ups after your visit        Additional Services     CHIROPRACTIC REFERRAL       Your provider has referred you to: [facility]: [facility] [phone]   [email or web address]    Please be aware that coverage of these services is subject to the terms and limitations of your health insurance plan.  Call member services at your health plan with any benefit or coverage questions.      Please bring the following to your appointment:    >>   Any x-rays, CTs or MRIs which have been performed.  Contact the facility where they were done to arrange for  prior to your scheduled appointment.    >>   List of current medications   >>   This referral request   >>   Any documents/labs given to you for this referral                  Your next 10 appointments already scheduled     Jun 22, 2018  3:30 PM CDT   (Arrive by 3:15 PM)   Office Visit with Melyssa Lewis MD   [facility] ([facility] [place] )    [address]  [place] [address] [phone]           Bring a current list of meds and any records pertaining to this visit. For Physicals, please bring immunization records and any forms needing to be filled out. Please arrive 10 minutes early to complete paperwork.              Who to contact     If you have questions or need follow up information about today's clinic visit or your schedule please contact [facility] [place] directly at [phone].  Normal or  "non-critical lab and imaging results will be communicated to you by MyChart, letter or phone within 4 business days after the clinic has received the results. If you do not hear from us within 7 days, please contact the clinic through [employer] or phone. If you have a critical or abnormal lab result, we will notify you by phone as soon as possible.  Submit refill requests through [employer] or call your pharmacy and they will forward the refill request to us. Please allow 3 business days for your refill to be completed.          Additional Information About Your Visit        AwesomeTouch Information     AwesomeTouch lets you send messages to your doctor, view your test results, renew your prescriptions, schedule appointments and more. To sign up, go to [email or web address] . Click on \"Log in\" on the left side of the screen, which will take you to the Welcome page. Then click on \"Sign up Now\" on the right side of the page.     You will be asked to enter the access code listed below, as well as some personal information. Please follow the directions to create your username and password.     Your access code is: [email or web address]  Expires: 2018  1:53 PM     Your access code will  in 90 days. If you need help or a new code, please call your [facility] clinic or [phone].        Care EveryWhere ID     This is your Care EveryWhere ID. This could be used by other organizations to access your [facility] medical records  [id number]        Your Vitals Were     Pulse Temperature Pulse Oximetry BMI (Body Mass Index)          77 98.4  F (36.9  C) (Tympanic) 96% 39.65 kg/m2         Blood Pressure from Last 3 Encounters:   18 122/86   18 116/80   18 121/84    Weight from Last 3 Encounters:   18 231 lb (104.8 kg)   18 232 lb (105.2 kg)              We Performed the Following     CHIROPRACTIC REFERRAL          Today's Medication Changes          These changes are accurate as of 18  5:23 PM.  If you " have any questions, ask your nurse or doctor.               Start taking these medicines.        Dose/Directions    nystatin 467822 UNIT/GM Powd   Commonly known as:  MYCOSTATIN   Used for:  Candidal intertrigo   Started by:  Melyssa Lewis MD        Apply topically 3 times daily as needed   Quantity:  30 g   Refills:  1       pantoprazole 20 MG EC tablet   Commonly known as:  PROTONIX   Used for:  Abdominal pain, epigastric   Started by:  Melyssa Lewis MD        Dose:  20 mg   Take 1 tablet (20 mg) by mouth daily Take by mouth 30-60 minutes before a meal.   Quantity:  60 tablet   Refills:  0            Where to get your medicines      These medications were sent to [id number] - [place], [place] - [address]   [address], [facility] [address]     Phone:  [phone]     nystatin 653204 UNIT/GM Powd    pantoprazole 20 MG EC tablet                Primary Care Provider Office Phone # Fax #    Melyssa Lewis [phone] [address] [facility] [place] [address]        Equal Access to Services     [facility] [facility]: Hadii sean ku hadasho Soomaali, waaxda luqadaha, qaybta kaalmada adeegyada, waxay markel haylisandra marlow . So [facility] [phone].    ATENCIÓN: Si habla español, tiene a crystal disposición servicios gratuitos de asistencia lingüística. Llame al [phone].    We comply with applicable federal civil rights laws and [place] laws. We do not discriminate on the basis of race, color, national origin, age, disability, sex, sexual orientation, or gender identity.            Thank you!     Thank you for choosing [facility]  for your care. Our goal is always to provide you with excellent care. Hearing back from our patients is one way we can continue to improve our services. Please take a few minutes to complete the written survey that you may receive in the mail after your visit with us. Thank you!             Your Updated Medication List - Protect others  around you: Learn how to safely use, store and throw away your medicines at www.disposemymeds.org.          This list is accurate as of 5/18/18  5:23 PM.  Always use your most recent med list.                   Brand Name Dispense Instructions for use Diagnosis    albuterol 108 (90 Base) MCG/ACT Inhaler    PROAIR HFA/PROVENTIL HFA/VENTOLIN HFA     Inhale 2 puffs into the lungs        EPINEPHrine 0.3 MG/0.3ML injection 2-pack    EPIPEN/ADRENACLICK/or ANY BX GENERIC EQUIV     Inject 0.3 mg into the muscle        ibuprofen 600 MG tablet    ADVIL/MOTRIN     Take 600 mg by mouth        nystatin 288550 UNIT/GM Powd    MYCOSTATIN    30 g    Apply topically 3 times daily as needed    Candidal intertrigo       omeprazole 20 MG CR capsule    priLOSEC    90 capsule    Take 1 capsule (20 mg) by mouth 2 times daily    Early satiety, Abdominal pain, epigastric       pantoprazole 20 MG EC tablet    PROTONIX    60 tablet    Take 1 tablet (20 mg) by mouth daily Take by mouth 30-60 minutes before a meal.    Abdominal pain, epigastric       propranolol 20 MG tablet    INDERAL     Take 20 mg by mouth        venlafaxine 37.5 MG tablet    EFFEXOR     Take 37.5 mg by mouth

## 2018-05-19 ASSESSMENT — ANXIETY QUESTIONNAIRES: GAD7 TOTAL SCORE: 11

## 2018-05-19 ASSESSMENT — PATIENT HEALTH QUESTIONNAIRE - PHQ9: SUM OF ALL RESPONSES TO PHQ QUESTIONS 1-9: 10

## 2018-05-29 ENCOUNTER — TELEPHONE (OUTPATIENT)
Dept: FAMILY MEDICINE | Facility: OTHER | Age: 43
End: 2018-05-29

## 2018-06-18 ENCOUNTER — TELEPHONE (OUTPATIENT)
Dept: FAMILY MEDICINE | Facility: OTHER | Age: 43
End: 2018-06-18

## 2018-06-18 NOTE — TELEPHONE ENCOUNTER
9:25 AM    Reason for Call: Phone Call    Description: Pt is wondering which chiropractor her referral went to. She would like a call back     Was an appointment offered for this call? No  If yes : Appointment type              Date    Preferred method for responding to this message: Telephone Call  What is your phone number ? [phone]    If we cannot reach you directly, may we leave a detailed response at the number you provided? Yes    Can this message wait until your PCP/provider returns, if available today? Yes.    Aurea De La Cruz

## 2018-06-22 ENCOUNTER — OFFICE VISIT (OUTPATIENT)
Dept: FAMILY MEDICINE | Facility: OTHER | Age: 43
End: 2018-06-22
Attending: FAMILY MEDICINE
Payer: COMMERCIAL

## 2018-06-22 VITALS
OXYGEN SATURATION: 96 % | TEMPERATURE: 98.8 F | BODY MASS INDEX: 38.79 KG/M2 | DIASTOLIC BLOOD PRESSURE: 82 MMHG | SYSTOLIC BLOOD PRESSURE: 132 MMHG | HEART RATE: 91 BPM | WEIGHT: 226 LBS

## 2018-06-22 DIAGNOSIS — R10.31 CHRONIC BILATERAL LOWER ABDOMINAL PAIN: ICD-10-CM

## 2018-06-22 DIAGNOSIS — R10.11 CHRONIC BILATERAL UPPER ABDOMINAL PAIN: ICD-10-CM

## 2018-06-22 DIAGNOSIS — R10.32 CHRONIC BILATERAL LOWER ABDOMINAL PAIN: ICD-10-CM

## 2018-06-22 DIAGNOSIS — G89.29 CHRONIC BILATERAL LOWER ABDOMINAL PAIN: ICD-10-CM

## 2018-06-22 DIAGNOSIS — R10.12 CHRONIC BILATERAL UPPER ABDOMINAL PAIN: ICD-10-CM

## 2018-06-22 DIAGNOSIS — R19.5 LOOSE STOOLS: ICD-10-CM

## 2018-06-22 DIAGNOSIS — G89.29 CHRONIC BILATERAL UPPER ABDOMINAL PAIN: ICD-10-CM

## 2018-06-22 DIAGNOSIS — G43.709 CHRONIC MIGRAINE WITHOUT AURA WITHOUT STATUS MIGRAINOSUS, NOT INTRACTABLE: ICD-10-CM

## 2018-06-22 DIAGNOSIS — K21.9 GASTROESOPHAGEAL REFLUX DISEASE, ESOPHAGITIS PRESENCE NOT SPECIFIED: Primary | ICD-10-CM

## 2018-06-22 DIAGNOSIS — F33.9 DEPRESSION, RECURRENT (H): ICD-10-CM

## 2018-06-22 PROCEDURE — G0463 HOSPITAL OUTPT CLINIC VISIT: HCPCS

## 2018-06-22 PROCEDURE — 99214 OFFICE O/P EST MOD 30 MIN: CPT | Performed by: FAMILY MEDICINE

## 2018-06-22 RX ORDER — VENLAFAXINE 37.5 MG/1
37.5 TABLET ORAL DAILY
Qty: 60 TABLET | Refills: 1 | Status: SHIPPED | OUTPATIENT
Start: 2018-06-22 | End: 2018-07-31

## 2018-06-22 RX ORDER — PROPRANOLOL HYDROCHLORIDE 20 MG/1
20 TABLET ORAL 2 TIMES DAILY
Qty: 180 TABLET | Refills: 1 | Status: SHIPPED | OUTPATIENT
Start: 2018-06-22 | End: 2018-12-11

## 2018-06-22 ASSESSMENT — ANXIETY QUESTIONNAIRES
6. BECOMING EASILY ANNOYED OR IRRITABLE: NEARLY EVERY DAY
IF YOU CHECKED OFF ANY PROBLEMS ON THIS QUESTIONNAIRE, HOW DIFFICULT HAVE THESE PROBLEMS MADE IT FOR YOU TO DO YOUR WORK, TAKE CARE OF THINGS AT HOME, OR GET ALONG WITH OTHER PEOPLE: VERY DIFFICULT
3. WORRYING TOO MUCH ABOUT DIFFERENT THINGS: NEARLY EVERY DAY
4. TROUBLE RELAXING: NEARLY EVERY DAY
GAD7 TOTAL SCORE: 17
2. NOT BEING ABLE TO STOP OR CONTROL WORRYING: MORE THAN HALF THE DAYS
1. FEELING NERVOUS, ANXIOUS, OR ON EDGE: NEARLY EVERY DAY
5. BEING SO RESTLESS THAT IT IS HARD TO SIT STILL: MORE THAN HALF THE DAYS
7. FEELING AFRAID AS IF SOMETHING AWFUL MIGHT HAPPEN: SEVERAL DAYS

## 2018-06-22 ASSESSMENT — PAIN SCALES - GENERAL: PAINLEVEL: EXTREME PAIN (8)

## 2018-06-22 NOTE — PROGRESS NOTES
SUBJECTIVE:                                                    Lashae Caicedo is a 42 year old female who presents to clinic today for the following health issues:      Abdominal Pain      Duration: many years    Description (location/character/radiation): abdomen       Associated flank pain: None    Intensity:  severe    Accompanying signs and symptoms:        Fever/Chills: no        Gas/Bloating: YES       Nausea/vomitting: no        Diarrhea: no        Dysuria or Hematuria: no, dark and kind of burns     History (previous similar pain/trauma/previous testing): no    Precipitating or alleviating factors:       Pain worse with eating/BM/urination: eating yes       Pain relieved by BM: YES    Therapies tried and outcome: protonix but its not helpful    LMP:  not applicable    This is chronic. Patient has both upper and lower abdominal pain. She states she has upper abdominal pain with eating. Feels like a burning, sharp pain in her sternal area. Worse when she lays flat. On Protonix daily, but does not feel this helps. Wants to take before each meal instead.     Has lower abdominal pain associated with chronic diarrhea as well. Has had extensive work up per her report. Last colonoscopy in 2011 and was notable only for polyps. BMs are occurring after every meal. Feels bloated and gassy all the time. She does not recall that she ever got a diagnosis. We have requested records x 2 from [place] and have not yet received.     Depression  Mood has been low. Had recent move. Financial stressors. Off Effexor for 6 months. Would like to restart. Felt this worked well for her in the past.     Migraines  Would like refill of her propranolol. Was started on this when she was living in [place], [place]. I can see this record. Felt this helped significantly. Was also given imitrex, but this apparently caused a rash. Using ibuprofen prn for abortive management with some success currently.     Problem list and histories reviewed &  adjusted, as indicated.  Additional history: as documented    Labs reviewed in EPIC    ROS:  Constitutional, HEENT, cardiovascular, pulmonary, gi and gu systems are negative, except as otherwise noted.    OBJECTIVE:     /82  Pulse 91  Temp 98.8  F (37.1  C) (Tympanic)  Wt 226 lb (102.5 kg)  SpO2 96%  BMI 38.79 kg/m2  Body mass index is 38.79 kg/(m^2).  GENERAL: healthy, alert and no distress  RESP: lungs clear to auscultation - no rales, rhonchi or wheezes  CV: regular rate and rhythm, normal S1 S2, no S3 or S4, no murmur, click or rub, no peripheral edema and peripheral pulses strong  ABDOMEN: soft, nontender, no hepatosplenomegaly, no masses and bowel sounds normal  MS: no gross musculoskeletal defects noted, no edema    Diagnostic Test Results:  none     ASSESSMENT/PLAN:       Chronic bilateral upper abdominal pain / Gastroesophageal reflux disease, esophagitis presence not specified  On protonix, by history not improved, however, abd exam improved. Patient also notes this and suspects the med is helping more than she believed. Regardless, still having sx on PPI, could consider EGD, will refer to GI.   - GASTROENTEROLOGY ADULT REF CONSULT ONLY    Chronic bilateral lower abdominal pain / Loose stools  Will again attempt to get records. Possible IBS if no previous findings on scope, however, pt is very poor historian.   - GASTROENTEROLOGY ADULT REF CONSULT ONLY    Chronic migraine without aura without status migrainosus, not intractable  Refilled propranolol. Has tolerated well in the past.   - propranolol (INDERAL) 20 MG tablet; Take 1 tablet (20 mg) by mouth 2 times daily  Dispense: 180 tablet; Refill: 1    Depression, recurrent (H)  Refilled. Mood low, but no SI  - venlafaxine (EFFEXOR) 37.5 MG tablet; Take 1 tablet (37.5 mg) by mouth daily  Dispense: 60 tablet; Refill: 1    Melyssa Lewis MD  [facility]

## 2018-06-22 NOTE — NURSING NOTE
"Chief Complaint   Patient presents with     RECHECK     abdominal pain       Initial /82  Pulse 91  Temp 98.8  F (37.1  C) (Tympanic)  Wt 226 lb (102.5 kg)  SpO2 96%  BMI 38.79 kg/m2 Estimated body mass index is 38.79 kg/(m^2) as calculated from the following:    Height as of 5/4/18: 5' 4\" (1.626 m).    Weight as of this encounter: 226 lb (102.5 kg).  Medication Reconciliation: complete    Rakel Robin MA    "

## 2018-06-22 NOTE — MR AVS SNAPSHOT
After Visit Summary   6/22/2018    Lashae Caicedo    MRN: [id number]           Patient Information     Date Of Birth          1975        Visit Information        Provider Department      6/22/2018 3:30 PM Melyssa Lewis MD [facility]        Today's Diagnoses     Gastroesophageal reflux disease, esophagitis presence not specified    -  1    Chronic bilateral upper abdominal pain        Chronic bilateral lower abdominal pain        Loose stools        Chronic migraine without aura without status migrainosus, not intractable        Depression, recurrent (H)           Follow-ups after your visit        Additional Services     GASTROENTEROLOGY ADULT REF CONSULT ONLY       Preferred Location: [facility], closest available.       Please be aware that coverage of these services is subject to the terms and limitations of your health insurance plan.  Call member services at your health plan with any benefit or coverage questions.  Any procedures must be performed at a [place] facility OR coordinated by your clinic's referral office.    Please bring the following with you to your appointment:    (1) Any X-Rays, CTs or MRIs which have been performed.  Contact the facility where they were done to arrange for  prior to your scheduled appointment.    (2) List of current medications   (3) This referral request   (4) Any documents/labs given to you for this referral                  Your next 10 appointments already scheduled     Sep 21, 2018  4:00 PM CDT   (Arrive by 3:45 PM)   [facility] with MD [facility] ([facility] [place] )    [address] [place] [address]   [phone]              Who to contact     If you have questions or need follow up information about today's clinic visit or your schedule please contact [facility] directly at [phone].  Normal or non-critical lab and imaging results will be communicated to you by  "MyChart, letter or phone within 4 business days after the clinic has received the results. If you do not hear from us within 7 days, please contact the clinic through [employer] or phone. If you have a critical or abnormal lab result, we will notify you by phone as soon as possible.  Submit refill requests through [employer] or call your pharmacy and they will forward the refill request to us. Please allow 3 business days for your refill to be completed.          Additional Information About Your Visit        [employer] Control[employer]ar[employer] Information     [employer] lets you send messages to your doctor, view your test results, renew your prescriptions, schedule appointments and more. To sign up, go to [email or web address] . Click on \"Log in\" on the left side of the screen, which will take you to the Welcome page. Then click on \"Sign up Now\" on the right side of the page.     You will be asked to enter the access code listed below, as well as some personal information. Please follow the directions to create your username and password.     Your access code is: [email or web address]  Expires: 2018  2:32 PM     Your access code will  in 90 days. If you need help or a new code, please call your [place] clinic or [phone].        Care EveryWhere ID     This is your Care EveryWhere ID. This could be used by other organizations to access your [place] medical records  [id number]        Your Vitals Were     Pulse Temperature Pulse Oximetry BMI (Body Mass Index)          91 98.8  F (37.1  C) (Tympanic) 96% 38.79 kg/m2         Blood Pressure from Last 3 Encounters:   18 132/82   18 122/86   18 116/80    Weight from Last 3 Encounters:   18 226 lb (102.5 kg)   18 231 lb (104.8 kg)   18 232 lb (105.2 kg)              We Performed the Following     GASTROENTEROLOGY ADULT REF CONSULT ONLY          Today's Medication Changes          These changes are accurate as of 18  4:23 PM.  If you have any questions, ask " your nurse or doctor.               Start taking these medicines.        Dose/Directions    propranolol 20 MG tablet   Commonly known as:  INDERAL   Used for:  Chronic migraine without aura without status migrainosus, not intractable   Started by:  Melyssa Lewis MD        Dose:  20 mg   Take 1 tablet (20 mg) by mouth 2 times daily   Quantity:  180 tablet   Refills:  1         These medicines have changed or have updated prescriptions.        Dose/Directions    venlafaxine 37.5 MG tablet   Commonly known as:  EFFEXOR   This may have changed:  when to take this   Used for:  Depression, recurrent (H)   Changed by:  Melyssa Lewis MD        Dose:  37.5 mg   Take 1 tablet (37.5 mg) by mouth daily   Quantity:  60 tablet   Refills:  1            Where to get your medicines      These medications were sent to [facility] [address] [facility] [place] - [address]   [address] , [facility] [address]     Phone:  [phone]     propranolol 20 MG tablet    venlafaxine 37.5 MG tablet                Primary Care Provider Office Phone # Fax #    Melyssa Lewis [phone]       [facility] [address]        Equal Access to Services     [facility]: Hadii sean ku hadasho Soomaali, waaxda luqadaha, qaybta kaalmada adeegyada, brissa jean baptiste haylisandra marlow . So [facility] [phone].    ATENCIÓN: Si habla español, tiene a crystal disposición servicios gratuitos de asistencia lingüística. [facility] [phone].    We comply with applicable federal civil rights laws and [place] laws. We do not discriminate on the basis of race, color, national origin, age, disability, sex, sexual orientation, or gender identity.            Thank you!     Thank you for choosing [facility]  for your care. Our goal is always to provide you with excellent care. Hearing back from our patients is one way we can continue to improve our services. Please take a few minutes to complete the written survey  that you may receive in the mail after your visit with us. Thank you!             Your Updated Medication List - Protect others around you: Learn how to safely use, store and throw away your medicines at www.disposemymeds.org.          This list is accurate as of 6/22/18  4:23 PM.  Always use your most recent med list.                   Brand Name Dispense Instructions for use Diagnosis    albuterol 108 (90 Base) MCG/ACT Inhaler    PROAIR HFA/PROVENTIL HFA/VENTOLIN HFA     Inhale 2 puffs into the lungs        EPINEPHrine 0.3 MG/0.3ML injection 2-pack    EPIPEN/ADRENACLICK/or ANY BX GENERIC EQUIV     Inject 0.3 mg into the muscle        ibuprofen 600 MG tablet    ADVIL/MOTRIN     Take 600 mg by mouth        nystatin [id number] UNIT/GM Powd    MYCOSTATIN    30 g    Apply topically 3 times daily as needed    Candidal intertrigo       pantoprazole 20 MG EC tablet    PROTONIX    60 tablet    Take 1 tablet (20 mg) by mouth daily Take by mouth 30-60 minutes before a meal.    Abdominal pain, epigastric       propranolol 20 MG tablet    INDERAL    180 tablet    Take 1 tablet (20 mg) by mouth 2 times daily    Chronic migraine without aura without status migrainosus, not intractable       venlafaxine 37.5 MG tablet    EFFEXOR    60 tablet    Take 1 tablet (37.5 mg) by mouth daily    Depression, recurrent (H)

## 2018-06-23 ASSESSMENT — ANXIETY QUESTIONNAIRES: GAD7 TOTAL SCORE: 17

## 2018-06-23 ASSESSMENT — PATIENT HEALTH QUESTIONNAIRE - PHQ9: SUM OF ALL RESPONSES TO PHQ QUESTIONS 1-9: 16

## 2018-06-26 ENCOUNTER — TELEPHONE (OUTPATIENT)
Dept: FAMILY MEDICINE | Facility: OTHER | Age: 43
End: 2018-06-26

## 2018-06-26 NOTE — TELEPHONE ENCOUNTER
4:30 PM    Reason for Call: Phone Call    Description: Pt states that she doesn't have a reliable vehicle to make it to [place] for Gastro[facility] appt. Pt is wanting a call bacck to discuss further options.     Was an appointment offered for this call? No  If yes : Appointment type              Date    Preferred method for responding to this message: Telephone Call  What is your phone number ?  [phone]  If we cannot reach you directly, may we leave a detailed response at the number you provided? Yes    Can this message wait until your PCP/provider returns, if available today? YES,    Aurea De La Cruz

## 2018-06-27 ENCOUNTER — HOSPITAL ENCOUNTER (EMERGENCY)
Facility: HOSPITAL | Age: 43
Discharge: HOME | End: 2018-06-27
Admitting: NURSE PRACTITIONER
Payer: COMMERCIAL

## 2018-06-27 VITALS
TEMPERATURE: 99.1 F | RESPIRATION RATE: 16 BRPM | SYSTOLIC BLOOD PRESSURE: 150 MMHG | DIASTOLIC BLOOD PRESSURE: 67 MMHG | OXYGEN SATURATION: 99 %

## 2018-06-27 DIAGNOSIS — M10.9 ACUTE GOUT OF RIGHT FOOT, UNSPECIFIED CAUSE: ICD-10-CM

## 2018-06-27 PROCEDURE — 99213 OFFICE O/P EST LOW 20 MIN: CPT | Performed by: NURSE PRACTITIONER

## 2018-06-27 PROCEDURE — G0463 HOSPITAL OUTPT CLINIC VISIT: HCPCS

## 2018-06-27 RX ORDER — PREDNISONE 20 MG/1
TABLET ORAL
Qty: 10 TABLET | Refills: 0 | Status: SHIPPED | OUTPATIENT
Start: 2018-06-27 | End: 2018-07-26

## 2018-06-27 ASSESSMENT — ENCOUNTER SYMPTOMS
CONSTITUTIONAL NEGATIVE: 1
COLOR CHANGE: 1
WOUND: 0

## 2018-06-27 NOTE — ED AVS SNAPSHOT
[facility]    [address]    [place] [address]    Phone:  [phone]                                       Lashae Caicedo   MRN: [id number]    Department:  [facility]   Date of Visit:  6/27/2018           Patient Information     Date Of Birth          1975        Your diagnoses for this visit were:     Acute gout of right foot, unspecified cause       Follow-up Information     Follow up with Melyssa Lewis MD In 5 days.    Specialty:  Family Practice    Why:  if not improving    Contact information:    [address]  [place] [address]  [phone]          Discharge Instructions         What Is Gout?  Gout is a disease that affects the joints. Left untreated, it can lead to painful foot and joint deformities and even kidney problems. But, by treating gout early, you can relieve pain and help prevent future problems. Gout can usually be treated with medication and proper diet. In severe cases, surgery may be needed.  What causes gout?  Gout is caused by an excess of uric acid (a waste product made by the body). Uric acid is excreted by the kidneys. If the uric acid level in your blood rises too high, the uric acid may form crystals that collect in the joints, bringing on a gout attack. If you have many gout attacks, crystals may form large deposits called tophi. Tophi can damage joints and cause deformity.  Who is at risk for gout?  Men are more likely to have gout than women. But women can also be affected, mostly after menopause. Some health problems, such as obesity and high cholesterol, make gout more likely. And some medications, such as diuretics ( water pills ), can trigger a gout attack. People who drink a lot of alcohol are at high risk for gout. Certain foods can also trigger a gout attack.  Substances that may trigger a gout attack  To help prevent a gout attack, avoid these foods:    Alcohol (particularly beer, but also red wine and spirits)    Certain  "meats (red meat, processed meat, turkey)    Organ meats (kidney, liver, sweetbread)    Shellfish (lobster, crab, shrimp, scallop, mussel)    Certain fish (anchovy, sardine, herring, mackerel)   Treatment    Lifestyle changes, including weight loss, exercise, and quitting tobacco use    Reducing consumption of the food groups above as well as high fructose corn syrup, found in many foods including sodas and energy drinks    Changing non-essential medications that may contribute to gout (such as thiazide diuretics--\"water pills\")    Medications to reduce the amount of uric acid in the blood, such as allopurinol or others    Medications to treat acute gout attacks, including NSAIDs (nonsteroidal anti-inflammatory medicines), steroids, and colchicine  Date Last Reviewed: 2/1/2016 2000-2017 [employer]. [address] [place] [address]. All rights reserved. This information is not intended as a substitute for professional medical care. Always follow your healthcare professional's instructions.          Your next 10 appointments already scheduled     Sep 21, 2018  4:00 PM CDT   (Arrive by 3:45 PM)   SHORT with Melyssa Lewis MD   [facility] ([facility] )    [address] [place], [place] [address] [id number] [phone]                 Review of your medicines      START taking        Dose / Directions Last dose taken    predniSONE 20 MG tablet   Commonly known as:  DELTASONE   Quantity:  10 tablet        Take two tablets (= 40mg) each day for 5 (five) days   Refills:  0          Our records show that you are taking the medicines listed below. If these are incorrect, please call your family doctor or clinic.        Dose / Directions Last dose taken    albuterol 108 (90 Base) MCG/ACT Inhaler   Commonly known as:  PROAIR HFA/PROVENTIL HFA/VENTOLIN HFA   Dose:  2 puff        Inhale 2 puffs into the lungs   Refills:  0        EPINEPHrine 0.3 MG/0.3ML injection 2-pack "   Commonly known as:  EPIPEN/ADRENACLICK/or ANY BX GENERIC EQUIV   Dose:  0.3 mg        Inject 0.3 mg into the muscle   Refills:  0        ibuprofen 600 MG tablet   Commonly known as:  ADVIL/MOTRIN   Dose:  600 mg        Take 600 mg by mouth   Refills:  0        nystatin 535354 UNIT/GM Powd   Commonly known as:  MYCOSTATIN   Quantity:  30 g        Apply topically 3 times daily as needed   Refills:  1        pantoprazole 20 MG EC tablet   Commonly known as:  PROTONIX   Dose:  20 mg   Quantity:  60 tablet        Take 1 tablet (20 mg) by mouth daily Take by mouth 30-60 minutes before a meal.   Refills:  0        propranolol 20 MG tablet   Commonly known as:  INDERAL   Dose:  20 mg   Quantity:  180 tablet        Take 1 tablet (20 mg) by mouth 2 times daily   Refills:  1        venlafaxine 37.5 MG tablet   Commonly known as:  EFFEXOR   Dose:  37.5 mg   Quantity:  60 tablet        Take 1 tablet (37.5 mg) by mouth daily   Refills:  1                Prescriptions were sent or printed at these locations (1 Prescription)                   [address] [place], [place] - [address] [facility] [address]   [address] [address] [address]    Telephone:  [phone]   Fax:  [phone]   Hours:                  E-Prescribed (1 of 1)         predniSONE (DELTASONE) 20 MG tablet                Orders Needing Specimen Collection     None      Pending Results     No orders found from 6/25/2018 to 6/28/2018.            Pending Culture Results     No orders found from 6/25/2018 to 6/28/2018.            Thank you for choosing [facility]       Thank you for choosing [facility] for your care. Our goal is always to provide you with excellent care. Hearing back from our patients is one way we can continue to improve our services. Please take a few minutes to complete the written survey that you may receive in the mail after you visit with us. Thank you!        [employer] Information     [employer] lets you send messages to your doctor, view your test results,  "renew your prescriptions, schedule appointments and more. To sign up, go to [email or web address] . Click on \"Log in\" on the left side of the screen, which will take you to the Welcome page. Then click on \"Sign up Now\" on the right side of the page.     You will be asked to enter the access code listed below, as well as some personal information. Please follow the directions to create your username and password.     Your access code is: [email or web address]  Expires: 2018  2:32 PM     Your access code will  in 90 days. If you need help or a new code, please call your [facility] clinic or [phone].        Care EveryWhere ID     This is your Care EveryWhere ID. This could be used by other organizations to access your [facility] medical records  [id number]        Equal Access to Services     SHANTHI BURTON : Hadlise Mendez, madison lynch, tarah pike, brissa marlow . So [facility] [phone].    ATENCIÓN: Si habla español, tiene a crystal disposición servicios gratuitos de asistencia lingüística. Llame al [phone].    We comply with applicable federal civil rights laws and [place] laws. We do not discriminate on the basis of race, color, national origin, age, disability, sex, sexual orientation, or gender identity.            After Visit Summary       This is your record. Keep this with you and show to your community pharmacist(s) and doctor(s) at your next visit.                  "

## 2018-06-27 NOTE — ED AVS SNAPSHOT
[facility]    [address] [address]    Phone:  [phone]                                       Lashae Caicedo   MRN: [id number]    Department:  [facility]   Date of Visit:  6/27/2018           After Visit Summary Signature Page     I have received my discharge instructions, and my questions have been answered. I have discussed any challenges I see with this plan with the nurse or doctor.    ..........................................................................................................................................  Patient/Patient Representative Signature      ..........................................................................................................................................  Patient Representative Print Name and Relationship to Patient    ..................................................               ................................................  Date                                            Time    ..........................................................................................................................................  Reviewed by Signature/Title    ...................................................              ..............................................  Date                                                            Time

## 2018-06-27 NOTE — ED PROVIDER NOTES
"  History     Chief Complaint   Patient presents with     Foot Pain     right sided x2 weeks. unsure on injury. states she wrapped last night and that made pain worse. states \"it only swells when I am up walking around.\"     The history is provided by the patient. No  was used.     Lashae Caicedo is a 42 year old female who presents with right foot pain for 2 weeks. Much worse the last 4 days.   Has redness and swelling. No injury. Reports a history of gout, states she was told it was caused from her boots rubbing on her foot.   Has been walking on it but pain is great and also keeping her awake at night.     Problem List:    Patient Active Problem List    Diagnosis Date Noted     Depression, recurrent (H) 10/25/2017     Priority: Medium     Abnormal uterine bleeding 09/21/2017     Priority: Medium     Migraine syndrome 09/12/2017     Priority: Medium     Hx of antibiotic allergy 03/20/2017     Priority: Medium     Obesity, Class II, BMI 35-39.9 03/20/2017     Priority: Medium     TMJ (temporomandibular joint syndrome) 03/20/2017     Priority: Medium        Past Medical History:    Past Medical History:   Diagnosis Date     Abnormal uterine bleeding (AUB)        Past Surgical History:    Past Surgical History:   Procedure Laterality Date     HC ABLATION, ENDOMETRIAL, THERMAL, W/O HYSTEROSCOPIC GUIDANCE       TUBAL LIGATION  2011       Family History:    Family History   Problem Relation Age of Onset     Other - See Comments Mother      endometriosis       Social History:  Marital Status:  Single [1]  Social History   Substance Use Topics     Smoking status: Current Every Day Smoker     Packs/day: 1.00     Types: Cigarettes     Smokeless tobacco: Former User     Alcohol use Yes      Comment: wine on occassion        Medications:      predniSONE (DELTASONE) 20 MG tablet   albuterol (PROAIR HFA/PROVENTIL HFA/VENTOLIN HFA) 108 (90 Base) MCG/ACT Inhaler   EPINEPHrine (EPIPEN/ADRENACLICK/OR ANY BX " GENERIC EQUIV) 0.3 MG/0.3ML injection 2-pack   ibuprofen (ADVIL/MOTRIN) 600 MG tablet   nystatin (MYCOSTATIN) 408069 UNIT/GM POWD   pantoprazole (PROTONIX) 20 MG EC tablet   propranolol (INDERAL) 20 MG tablet   venlafaxine (EFFEXOR) 37.5 MG tablet         Review of Systems   Constitutional: Negative.    Musculoskeletal:        Right foot pain, swelling and redness. No injury, no rash, no open areas.   Skin: Positive for color change. Negative for wound.       Physical Exam   BP: 150/67  Heart Rate: 80  Temp: 99.1  F (37.3  C)  Resp: 16  SpO2: 99 %      Physical Exam   Constitutional: She is oriented to person, place, and time. She appears well-developed and well-nourished. No distress.   Musculoskeletal:        Right foot: There is tenderness and swelling. There is normal range of motion, normal capillary refill, no crepitus and no deformity.        Feet:    Noted area is more swollen and erythematous.   Neurological: She is alert and oriented to person, place, and time.   Skin: Skin is warm and dry. She is not diaphoretic.   Psychiatric: She has a normal mood and affect.   Nursing note and vitals reviewed.      ED Course     ED Course     Procedures     No results found for this or any previous visit (from the past 24 hour(s)).    Medications - No data to display    Assessments & Plan (with Medical Decision Making)     I have reviewed the nursing notes.  I have reviewed the findings, diagnosis, plan and need for follow up with the patient.  Patient declines lab work.   Will treat based on history, pain out of proportion without injury.   Discussed causes and prevention.   Ordered prednisone.   Advised to f/u with PCP if not improving in 3-5 days.   Given Epic educational materials.     New Prescriptions    PREDNISONE (DELTASONE) 20 MG TABLET    Take two tablets (= 40mg) each day for 5 (five) days       Final diagnoses:   Acute gout of right foot, unspecified cause       6/27/2018   [facility]      Maddie Ball NP  06/27/18 3947

## 2018-06-29 NOTE — TELEPHONE ENCOUNTER
Pt called back and I did let her know that [facility] has a one Doctor that comes up from [place] goes to [facility] but they are book out till Feb 2019. There is none that go to [place]. I also let pt know that her insurance pays for a ride to her appts. We can make the [place] appt and a  will drive her there and home. She stated she does not want to leave the town of [place] not even to go to [place] or [place], and wants a sooner appt here. I will ask Dr. Lewis if she can see General surgery here. Message sent to

## 2018-06-29 NOTE — TELEPHONE ENCOUNTER
Our general surgeons can do the scopes, but would not be able to manage potential causes of chronic abdominal pain. She will need to see a GI provider for that unfortunately .

## 2018-06-29 NOTE — TELEPHONE ENCOUNTER
Pt notified to call her insurance and change PCC to have insurance referral done through us per referral dept.  also pt wanted all info sent and pushed to PACs to [facility] for [place] location appt. She will wait

## 2018-07-10 ENCOUNTER — OFFICE VISIT (OUTPATIENT)
Dept: FAMILY MEDICINE | Facility: OTHER | Age: 43
End: 2018-07-10
Attending: FAMILY MEDICINE
Payer: COMMERCIAL

## 2018-07-10 ENCOUNTER — RADIANT APPOINTMENT (OUTPATIENT)
Dept: GENERAL RADIOLOGY | Facility: OTHER | Age: 43
End: 2018-07-10
Attending: FAMILY MEDICINE
Payer: COMMERCIAL

## 2018-07-10 VITALS
RESPIRATION RATE: 16 BRPM | SYSTOLIC BLOOD PRESSURE: 122 MMHG | HEART RATE: 101 BPM | BODY MASS INDEX: 37.56 KG/M2 | OXYGEN SATURATION: 98 % | WEIGHT: 220 LBS | TEMPERATURE: 98.6 F | HEIGHT: 64 IN | DIASTOLIC BLOOD PRESSURE: 78 MMHG

## 2018-07-10 DIAGNOSIS — M25.474 SWELLING OF FOOT JOINT, RIGHT: Primary | ICD-10-CM

## 2018-07-10 PROCEDURE — 73630 X-RAY EXAM OF FOOT: CPT | Mod: TC,RT

## 2018-07-10 PROCEDURE — G0463 HOSPITAL OUTPT CLINIC VISIT: HCPCS

## 2018-07-10 PROCEDURE — 99213 OFFICE O/P EST LOW 20 MIN: CPT | Performed by: FAMILY MEDICINE

## 2018-07-10 RX ORDER — PREDNISONE 20 MG/1
TABLET ORAL
Qty: 20 TABLET | Refills: 0 | Status: SHIPPED | OUTPATIENT
Start: 2018-07-10 | End: 2018-07-26

## 2018-07-10 ASSESSMENT — ANXIETY QUESTIONNAIRES
7. FEELING AFRAID AS IF SOMETHING AWFUL MIGHT HAPPEN: NEARLY EVERY DAY
6. BECOMING EASILY ANNOYED OR IRRITABLE: NEARLY EVERY DAY
4. TROUBLE RELAXING: NEARLY EVERY DAY
IF YOU CHECKED OFF ANY PROBLEMS ON THIS QUESTIONNAIRE, HOW DIFFICULT HAVE THESE PROBLEMS MADE IT FOR YOU TO DO YOUR WORK, TAKE CARE OF THINGS AT HOME, OR GET ALONG WITH OTHER PEOPLE: EXTREMELY DIFFICULT
1. FEELING NERVOUS, ANXIOUS, OR ON EDGE: NEARLY EVERY DAY
GAD7 TOTAL SCORE: 20
2. NOT BEING ABLE TO STOP OR CONTROL WORRYING: NEARLY EVERY DAY
3. WORRYING TOO MUCH ABOUT DIFFERENT THINGS: NEARLY EVERY DAY
5. BEING SO RESTLESS THAT IT IS HARD TO SIT STILL: MORE THAN HALF THE DAYS

## 2018-07-10 ASSESSMENT — PAIN SCALES - GENERAL: PAINLEVEL: SEVERE PAIN (6)

## 2018-07-10 NOTE — PROGRESS NOTES
"  SUBJECTIVE:   Lashae Caicedo is a 42 year old female who presents to clinic today for the following health issues:      Acute Gout      Duration: 6/27/18    Description (location/character/radiation): Seen in ER for acute gout    Intensity:  mild    Accompanying signs and symptoms: none    History (similar episodes/previous evaluation): ER 6/27/18    Precipitating or alleviating factors: None    Therapies tried and outcome: Prednisone     Patient presents to clinic for follow up of redness, pain and swelling of the left dorsal foot. Started 2 weeks prior to ED visit. No known injury. Treated for gout with prednisone. Helped for the first day or so, but pain returned after that. Has not progressed to worsening since that time. Can bear weight, but very tender. History of gout in her ankle previously on 1-2 occasions. This responded to steroid rapidly in the past. No fevers, chills. No scratches or sores on foot. No other arthralgia. Joint swelling.     Problem list and histories reviewed & adjusted, as indicated.  Additional history: as documented    Labs reviewed in EPIC    Reviewed and updated as needed this visit by clinical staff  Tobacco  Allergies  Meds  Problems  Med Hx  Surg Hx  Fam Hx  Soc Hx        Reviewed and updated as needed this visit by Provider         ROS:  Constitutional, HEENT, cardiovascular, pulmonary, gi and gu systems are negative, except as otherwise noted.    OBJECTIVE:     /78  Pulse 101  Temp 98.6  F (37  C) (Tympanic)  Resp 16  Ht 5' 4\" (1.626 m)  Wt 220 lb (99.8 kg)  SpO2 98%  BMI 37.76 kg/m2  Body mass index is 37.76 kg/(m^2).  GENERAL: healthy, alert and no distress  RESP: lungs clear to auscultation - no rales, rhonchi or wheezes  CV: regular rate and rhythm, normal S1 S2, no S3 or S4, no murmur, click or rub, no peripheral edema and peripheral pulses strong  ABDOMEN: soft, nontender, no hepatosplenomegaly, no masses and bowel sounds normal  MS: right foot with " dorsal swelling that is diffuse and mild. There is also very faint erythema. No open areas or excoriation. Area is exquisitely tender to palpation. Good ROM in the foot, strength at ankles and toes intact.     Diagnostic Test Results:  Results for orders placed or performed in visit on 07/10/18   XR Foot Right G/E 3 Views    Narrative    PROCEDURE:  XR FOOT RT G/E 3 VW    HISTORY: ; Swelling of foot joint, right    COMPARISON:  None.    TECHNIQUE:  3 views of the right foot were obtained.    FINDINGS:  No fracture or dislocation is identified. The joint spaces  are preserved.        Impression    IMPRESSION: Normal right foot      CHEN CAMARA MD       ASSESSMENT/PLAN:     1. Swelling of foot joint, right  Unclear etiology. No joint abnormality. Does not exactly feel consistent with gout arely given mediocre response to steroid. Would expect cellulitis to have worsened significantly by now. Possible inflammatory issue. Trial of steroid taper. If not effective, consider lyme testing, wing, etc.   - XR Foot Right G/E 3 Views; Future  - predniSONE (DELTASONE) 20 MG tablet; Take 3 tabs (60 mg) by mouth daily x 3 days, 2 tabs (40 mg) daily x 3 days, 1 tab (20 mg) daily x 3 days, then 1/2 tab (10 mg) x 3 days.  Dispense: 20 tablet; Refill: 0    Melyssa Lewis MD  [facility]

## 2018-07-10 NOTE — NURSING NOTE
"Chief Complaint   Patient presents with     Arthritis     GOUT       Initial /78  Pulse 101  Temp 98.6  F (37  C) (Tympanic)  Resp 16  Ht 5' 4\" (1.626 m)  Wt 220 lb (99.8 kg)  SpO2 98%  BMI 37.76 kg/m2 Estimated body mass index is 37.76 kg/(m^2) as calculated from the following:    Height as of this encounter: 5' 4\" (1.626 m).    Weight as of this encounter: 220 lb (99.8 kg).  Medication Reconciliation: complete    Nica Jones LPN    "

## 2018-07-10 NOTE — MR AVS SNAPSHOT
"              After Visit Summary   7/10/2018    Lashae Caicedo    MRN: [id number]           Patient Information     Date Of Birth          1975        Visit Information        Provider Department      7/10/2018 4:30 PM Melyssa Lewis MD [facility] [place]        Today's Diagnoses     Swelling of foot joint, right    -  1       Follow-ups after your visit        Your next 10 appointments already scheduled     Sep 21, 2018  4:00 PM CDT   (Arrive by 3:45 PM)   SHORT with Melyssa Lewis MD   [facility] [place] ([facility] )    [address] [place] [address]   [phone]              Who to contact     If you have questions or need follow up information about today's clinic visit or your schedule please contact [facility] [place] directly at [phone].  Normal or non-critical lab and imaging results will be communicated to you by MyChart, letter or phone within 4 business days after the clinic has received the results. If you do not hear from us within 7 days, please contact the clinic through MyChart or phone. If you have a critical or abnormal lab result, we will notify you by phone as soon as possible.  Submit refill requests through in2nite or call your pharmacy and they will forward the refill request to us. Please allow 3 business days for your refill to be completed.          Additional Information About Your Visit        MyChart Information     in2nite lets you send messages to your doctor, view your test results, renew your prescriptions, schedule appointments and more. To sign up, go to [email or web address] . Click on \"Log in\" on the left side of the screen, which will take you to the Welcome page. Then click on \"Sign up Now\" on the right side of the page.     You will be asked to enter the access code listed below, as well as some personal information. Please follow the directions to create your username and password.     Your access code is: " [email or web address]  Expires: 2018  2:32 PM     Your access code will  in 90 days. If you need help or a new code, please call your [facility] clinic or [phone].        Care EveryWhere ID     This is your Care EveryWhere ID. This could be used by other organizations to access your [facility] medical records  [id number]        Your Vitals Were     Pulse Temperature Respirations Height Pulse Oximetry BMI (Body Mass Index)    101 98.6  F (37  C) (Tympanic) 16 5' 4\" (1.626 m) 98% 37.76 kg/m2       Blood Pressure from Last 3 Encounters:   07/10/18 122/78   18 150/67   18 132/82    Weight from Last 3 Encounters:   07/10/18 220 lb (99.8 kg)   18 226 lb (102.5 kg)   18 231 lb (104.8 kg)              We Performed the Following     XR Foot Right G/E 3 Views          Today's Medication Changes          These changes are accurate as of 7/10/18 11:59 PM.  If you have any questions, ask your nurse or doctor.               These medicines have changed or have updated prescriptions.        Dose/Directions    * predniSONE 20 MG tablet   Commonly known as:  DELTASONE   This may have changed:  Another medication with the same name was added. Make sure you understand how and when to take each.   Changed by:  Melyssa Lewis MD        Take two tablets (= 40mg) each day for 5 (five) days   Quantity:  10 tablet   Refills:  0       * predniSONE 20 MG tablet   Commonly known as:  DELTASONE   This may have changed:  You were already taking a medication with the same name, and this prescription was added. Make sure you understand how and when to take each.   Used for:  Swelling of foot joint, right   Changed by:  Melyssa Lewis MD        Take 3 tabs (60 mg) by mouth daily x 3 days, 2 tabs (40 mg) daily x 3 days, 1 tab (20 mg) daily x 3 days, then 1/2 tab (10 mg) x 3 days.   Quantity:  20 tablet   Refills:  0       * Notice:  This list has 2 medication(s) that are the same as other medications prescribed for " you. Read the directions carefully, and ask your doctor or other care provider to review them with you.         Where to get your medicines      These medications were sent to [facility] - [place], [place] - [address]   [id number] , [facility] [address]     Phone:  [phone]     predniSONE 20 MG tablet                Primary Care Provider Office Phone # Fax #    Melyssa Lewis [phone]       [facility] [address]        Equal Access to Services     [facility] : Hadii aad ku hadasho Soomaali, waaxda luqadaha, qaybta kaalmada adeegyada, waxay idiin hayaan vida marlow . So [facility] [phone].    ATENCIÓN: Si habla español, tiene a crystal disposición servicios gratuitos de asistencia lingüística. [facility] [phone].    We comply with applicable federal civil rights laws and [place] laws. We do not discriminate on the basis of race, color, national origin, age, disability, sex, sexual orientation, or gender identity.            Thank you!     Thank you for choosing [facility]  for your care. Our goal is always to provide you with excellent care. Hearing back from our patients is one way we can continue to improve our services. Please take a few minutes to complete the written survey that you may receive in the mail after your visit with us. Thank you!             Your Updated Medication List - Protect others around you: Learn how to safely use, store and throw away your medicines at www.disposemymeds.org.          This list is accurate as of 7/10/18 11:59 PM.  Always use your most recent med list.                   Brand Name Dispense Instructions for use Diagnosis    albuterol 108 (90 Base) MCG/ACT Inhaler    PROAIR HFA/PROVENTIL HFA/VENTOLIN HFA     Inhale 2 puffs into the lungs        EPINEPHrine 0.3 MG/0.3ML injection 2-pack    EPIPEN/ADRENACLICK/or ANY BX GENERIC EQUIV     Inject 0.3 mg into the muscle        ibuprofen 600 MG tablet     ADVIL/MOTRIN     Take 600 mg by mouth        nystatin 972119 UNIT/GM Powd    MYCOSTATIN    30 g    Apply topically 3 times daily as needed    Candidal intertrigo       pantoprazole 20 MG EC tablet    PROTONIX    60 tablet    Take 1 tablet (20 mg) by mouth daily Take by mouth 30-60 minutes before a meal.    Abdominal pain, epigastric       * predniSONE 20 MG tablet    DELTASONE    10 tablet    Take two tablets (= 40mg) each day for 5 (five) days        * predniSONE 20 MG tablet    DELTASONE    20 tablet    Take 3 tabs (60 mg) by mouth daily x 3 days, 2 tabs (40 mg) daily x 3 days, 1 tab (20 mg) daily x 3 days, then 1/2 tab (10 mg) x 3 days.    Swelling of foot joint, right       propranolol 20 MG tablet    INDERAL    180 tablet    Take 1 tablet (20 mg) by mouth 2 times daily    Chronic migraine without aura without status migrainosus, not intractable       venlafaxine 37.5 MG tablet    EFFEXOR    60 tablet    Take 1 tablet (37.5 mg) by mouth daily    Depression, recurrent (H)       * Notice:  This list has 2 medication(s) that are the same as other medications prescribed for you. Read the directions carefully, and ask your doctor or other care provider to review them with you.

## 2018-07-11 ASSESSMENT — ANXIETY QUESTIONNAIRES: GAD7 TOTAL SCORE: 20

## 2018-07-11 ASSESSMENT — PATIENT HEALTH QUESTIONNAIRE - PHQ9: SUM OF ALL RESPONSES TO PHQ QUESTIONS 1-9: 18

## 2018-07-26 ENCOUNTER — HOSPITAL ENCOUNTER (EMERGENCY)
Facility: HOSPITAL | Age: 43
Discharge: HOME | End: 2018-07-26
Attending: FAMILY MEDICINE | Admitting: FAMILY MEDICINE
Payer: COMMERCIAL

## 2018-07-26 ENCOUNTER — APPOINTMENT (OUTPATIENT)
Dept: CT IMAGING | Facility: HOSPITAL | Age: 43
End: 2018-07-26
Attending: FAMILY MEDICINE
Payer: COMMERCIAL

## 2018-07-26 VITALS
HEART RATE: 82 BPM | OXYGEN SATURATION: 98 % | SYSTOLIC BLOOD PRESSURE: 126 MMHG | DIASTOLIC BLOOD PRESSURE: 79 MMHG | TEMPERATURE: 98.6 F | RESPIRATION RATE: 16 BRPM

## 2018-07-26 DIAGNOSIS — R10.84 ABDOMINAL PAIN, GENERALIZED: ICD-10-CM

## 2018-07-26 LAB
ALBUMIN SERPL-MCNC: 3.7 G/DL (ref 3.4–5)
ALBUMIN UR-MCNC: NEGATIVE MG/DL
ALP SERPL-CCNC: 75 U/L (ref 40–150)
ALT SERPL W P-5'-P-CCNC: 51 U/L (ref 0–50)
ANION GAP SERPL CALCULATED.3IONS-SCNC: 6 MMOL/L (ref 3–14)
APPEARANCE UR: CLEAR
AST SERPL W P-5'-P-CCNC: 19 U/L (ref 0–45)
BACTERIA #/AREA URNS HPF: ABNORMAL /HPF
BASOPHILS # BLD AUTO: 0.1 10E9/L (ref 0–0.2)
BASOPHILS NFR BLD AUTO: 1.2 %
BILIRUB SERPL-MCNC: 0.4 MG/DL (ref 0.2–1.3)
BILIRUB UR QL STRIP: NEGATIVE
BUN SERPL-MCNC: 11 MG/DL (ref 7–30)
CALCIUM SERPL-MCNC: 8.5 MG/DL (ref 8.5–10.1)
CHLORIDE SERPL-SCNC: 110 MMOL/L (ref 94–109)
CO2 SERPL-SCNC: 26 MMOL/L (ref 20–32)
COLOR UR AUTO: YELLOW
CREAT SERPL-MCNC: 0.74 MG/DL (ref 0.52–1.04)
CRP SERPL-MCNC: <2.9 MG/L (ref 0–8)
DIFFERENTIAL METHOD BLD: NORMAL
EOSINOPHIL # BLD AUTO: 0.1 10E9/L (ref 0–0.7)
EOSINOPHIL NFR BLD AUTO: 1.9 %
ERYTHROCYTE [DISTWIDTH] IN BLOOD BY AUTOMATED COUNT: 13.2 % (ref 10–15)
GFR SERPL CREATININE-BSD FRML MDRD: 85 ML/MIN/1.7M2
GLUCOSE SERPL-MCNC: 86 MG/DL (ref 70–99)
GLUCOSE UR STRIP-MCNC: NEGATIVE MG/DL
HCT VFR BLD AUTO: 42 % (ref 35–47)
HGB BLD-MCNC: 14.2 G/DL (ref 11.7–15.7)
HGB UR QL STRIP: ABNORMAL
IMM GRANULOCYTES # BLD: 0 10E9/L (ref 0–0.4)
IMM GRANULOCYTES NFR BLD: 0.1 %
KETONES UR STRIP-MCNC: NEGATIVE MG/DL
LEUKOCYTE ESTERASE UR QL STRIP: NEGATIVE
LYMPHOCYTES # BLD AUTO: 2.7 10E9/L (ref 0.8–5.3)
LYMPHOCYTES NFR BLD AUTO: 40.8 %
MCH RBC QN AUTO: 32.8 PG (ref 26.5–33)
MCHC RBC AUTO-ENTMCNC: 33.8 G/DL (ref 31.5–36.5)
MCV RBC AUTO: 97 FL (ref 78–100)
MONOCYTES # BLD AUTO: 0.5 10E9/L (ref 0–1.3)
MONOCYTES NFR BLD AUTO: 7.3 %
MUCOUS THREADS #/AREA URNS LPF: PRESENT /LPF
NEUTROPHILS # BLD AUTO: 3.3 10E9/L (ref 1.6–8.3)
NEUTROPHILS NFR BLD AUTO: 48.7 %
NITRATE UR QL: NEGATIVE
NRBC # BLD AUTO: 0 10*3/UL
NRBC BLD AUTO-RTO: 0 /100
PH UR STRIP: 5.5 PH (ref 4.7–8)
PLATELET # BLD AUTO: 263 10E9/L (ref 150–450)
POTASSIUM SERPL-SCNC: 3.6 MMOL/L (ref 3.4–5.3)
PROT SERPL-MCNC: 7 G/DL (ref 6.8–8.8)
RBC # BLD AUTO: 4.33 10E12/L (ref 3.8–5.2)
RBC #/AREA URNS AUTO: 1 /HPF (ref 0–2)
SODIUM SERPL-SCNC: 142 MMOL/L (ref 133–144)
SOURCE: ABNORMAL
SP GR UR STRIP: 1.01 (ref 1–1.03)
SQUAMOUS #/AREA URNS AUTO: 3 /HPF (ref 0–1)
UROBILINOGEN UR STRIP-MCNC: NORMAL MG/DL (ref 0–2)
WBC # BLD AUTO: 6.7 10E9/L (ref 4–11)
WBC #/AREA URNS AUTO: <1 /HPF (ref 0–5)

## 2018-07-26 PROCEDURE — 96376 TX/PRO/DX INJ SAME DRUG ADON: CPT

## 2018-07-26 PROCEDURE — 96375 TX/PRO/DX INJ NEW DRUG ADDON: CPT

## 2018-07-26 PROCEDURE — 81001 URINALYSIS AUTO W/SCOPE: CPT | Performed by: FAMILY MEDICINE

## 2018-07-26 PROCEDURE — 36415 COLL VENOUS BLD VENIPUNCTURE: CPT | Performed by: FAMILY MEDICINE

## 2018-07-26 PROCEDURE — 25000128 H RX IP 250 OP 636: Performed by: FAMILY MEDICINE

## 2018-07-26 PROCEDURE — 99284 EMERGENCY DEPT VISIT MOD MDM: CPT | Mod: Z6 | Performed by: FAMILY MEDICINE

## 2018-07-26 PROCEDURE — 74176 CT ABD & PELVIS W/O CONTRAST: CPT | Mod: TC

## 2018-07-26 PROCEDURE — 85025 COMPLETE CBC W/AUTO DIFF WBC: CPT | Performed by: FAMILY MEDICINE

## 2018-07-26 PROCEDURE — 99285 EMERGENCY DEPT VISIT HI MDM: CPT | Mod: 25

## 2018-07-26 PROCEDURE — 86140 C-REACTIVE PROTEIN: CPT | Performed by: FAMILY MEDICINE

## 2018-07-26 PROCEDURE — 96374 THER/PROPH/DIAG INJ IV PUSH: CPT

## 2018-07-26 PROCEDURE — 80053 COMPREHEN METABOLIC PANEL: CPT | Performed by: FAMILY MEDICINE

## 2018-07-26 RX ORDER — KETOROLAC TROMETHAMINE 30 MG/ML
30 INJECTION, SOLUTION INTRAMUSCULAR; INTRAVENOUS ONCE
Status: COMPLETED | OUTPATIENT
Start: 2018-07-26 | End: 2018-07-26

## 2018-07-26 RX ORDER — KETOROLAC TROMETHAMINE 10 MG/1
10 TABLET, FILM COATED ORAL EVERY 6 HOURS PRN
Qty: 20 TABLET | Refills: 0 | Status: SHIPPED | OUTPATIENT
Start: 2018-07-26 | End: 2018-07-31

## 2018-07-26 RX ORDER — FENTANYL CITRATE 50 UG/ML
50 INJECTION, SOLUTION INTRAMUSCULAR; INTRAVENOUS
Status: DISCONTINUED | OUTPATIENT
Start: 2018-07-26 | End: 2018-07-26 | Stop reason: HOSPADM

## 2018-07-26 RX ADMIN — KETOROLAC TROMETHAMINE 30 MG: 30 INJECTION, SOLUTION INTRAMUSCULAR at 15:44

## 2018-07-26 RX ADMIN — SODIUM CHLORIDE 1000 ML: 9 INJECTION, SOLUTION INTRAVENOUS at 15:34

## 2018-07-26 RX ADMIN — FENTANYL CITRATE 50 MCG: 50 INJECTION INTRAMUSCULAR; INTRAVENOUS at 17:58

## 2018-07-26 RX ADMIN — FENTANYL CITRATE 50 MCG: 50 INJECTION INTRAMUSCULAR; INTRAVENOUS at 16:41

## 2018-07-26 ASSESSMENT — ENCOUNTER SYMPTOMS
DIFFICULTY URINATING: 1
CONSTIPATION: 0
DIARRHEA: 0
VOMITING: 0
FREQUENCY: 0
BACK PAIN: 1
HEMATURIA: 0
FLANK PAIN: 1
DYSURIA: 0
NEUROLOGICAL NEGATIVE: 1
ABDOMINAL PAIN: 1
CARDIOVASCULAR NEGATIVE: 1
RESPIRATORY NEGATIVE: 1

## 2018-07-26 NOTE — ED AVS SNAPSHOT
[facility]    [address] [address]    Phone:  [phone]                                       Lashae Caicedo   MRN: [id number]    Department:  [facility]   Date of Visit:  7/26/2018           Patient Information     Date Of Birth          1975        Your diagnoses for this visit were:     Abdominal pain, generalized        You were seen by Cris Bryant MD.      Follow-up Information     Schedule an appointment as soon as possible for a visit with Melyssa Lewis MD.    Specialty:  Family Practice    Contact information:    [address] [place] [address]  [phone]          Discharge Instructions         Abdominal Pain    Abdominal pain is pain in the stomach or belly area. Everyone has this pain from time to time. In many cases it goes away on its own. But abdominal pain can sometimes be due to a serious problem, such as appendicitis. So it s important to know when to seek help.  Causes of abdominal pain  There are many possible causes of abdominal pain. Common causes in adults include:    Constipation, diarrhea, or gas    Stomach acid flowing back up into the esophagus (acid reflux or heartburn)    Severe acid reflux, called GERD (gastroesophageal reflux disease)    A sore in the lining of the stomach or small intestine (peptic ulcer)    Inflammation of the gallbladder, liver, or pancreas    Gallstones or kidney stones    Appendicitis     Intestinal blockage     An internal organ pushing through a muscle or other tissue (hernia)    Urinary tract infections    In women, menstrual cramps, fibroids, or endometriosis    Inflammation or infection of the intestines  Diagnosing the cause of abdominal pain  Your healthcare provider will do a physical exam help find the cause of your pain. If needed, tests will be ordered. Belly pain has many possible causes. So it can be hard to find the reason for your pain. Giving details about  your pain can help. Tell your provider where and when you feel the pain, and what makes it better or worse. Also let your provider know if you have other symptoms such as:    Fever    Tiredness    Upset stomach (nausea)    Vomiting    Changes in bathroom habits  Treating abdominal pain  Some causes of pain need emergency medical treatment right away. These include appendicitis or a bowel blockage. Other problems can be treated with rest, fluids, or medicines. Your healthcare provider can give you specific instructions for treatment or self-care based on what is causing your pain.  If you have vomiting or diarrhea, sip water or other clear fluids. When you are ready to eat solid foods again, start with small amounts of easy-to-digest, low-fat foods. These include apple sauce, toast, or crackers.   When to seek medical care  Call 911 or go to the hospital right away if you:    Can t pass stool and are vomiting    Are vomiting blood or have bloody diarrhea or black, tarry diarrhea    Have chest, neck, or shoulder pain    Feel like you might pass out    Have pain in your shoulder blades with nausea    Have sudden, severe belly pain    Have new, severe pain unlike any you have felt before    Have a belly that is rigid, hard, and tender to touch  Call your healthcare provider if you have:    Pain for more than 5 days    Bloating for more than 2 days    Diarrhea for more than 5 days    A fever of 100.4 F (38 C) or higher, or as directed by your healthcare provider    Pain that gets worse    Weight loss for no reason    Continued lack of appetite    Blood in your stool  How to prevent abdominal pain  Here are some tips to help prevent abdominal pain:    Eat smaller amounts of food at one time.    Avoid greasy, fried, or other high-fat foods.    Avoid foods that give you gas.    Exercise regularly.    Drink plenty of fluids.  To help prevent GERD symptoms:    Quit smoking.    Reduce alcohol and certain foods that increase  stomach acid.    Avoid aspirin and over-the-counter pain and fever medicines (NSAIDS or nonsteroidal anti-inflammatory drugs), if possible    Lose extra weight.    Finish eating at least 2 hours before you go to bed or lie down.    Raise the head of your bed.  Date Last Reviewed: 7/1/2016 2000-2017 The "[employer]. [address] [place] [address], [place] [address]. All rights reserved. This information is not intended as a substitute for professional medical care. Always follow your healthcare professional's instructions.      Recommend  schedule follow up appointment with your primary care provider. Pelvic ultrasound may be indicated to rule out ovarian or pelvic pathology     Your next 10 appointments already scheduled     Sep 21, 2018  4:00 PM CDT   (Arrive by 3:45 PM)   SHORT with Melyssa Lewis MD   [facility] [place] ([facility] )    [address] [place] [address] [place]  [place] [address]   [phone]                 Review of your medicines      START taking        Dose / Directions Last dose taken    ketorolac 10 MG tablet   Commonly known as:  TORADOL   Dose:  10 mg   Quantity:  20 tablet        Take 1 tablet (10 mg) by mouth every 6 hours as needed for moderate pain   Refills:  0          Our records show that you are taking the medicines listed below. If these are incorrect, please call your family doctor or clinic.        Dose / Directions Last dose taken    albuterol 108 (90 Base) MCG/ACT Inhaler   Commonly known as:  PROAIR HFA/PROVENTIL HFA/VENTOLIN HFA   Dose:  2 puff        Inhale 2 puffs into the lungs   Refills:  0        EPINEPHrine 0.3 MG/0.3ML injection 2-pack   Commonly known as:  EPIPEN/ADRENACLICK/or ANY BX GENERIC EQUIV   Dose:  0.3 mg        Inject 0.3 mg into the muscle   Refills:  0        nystatin 481396 UNIT/GM Powd   Commonly known as:  MYCOSTATIN   Quantity:  30 g        Apply topically 3 times daily as needed   Refills:  1        pantoprazole 20 MG EC tablet  "  Commonly known as:  PROTONIX   Dose:  20 mg   Quantity:  60 tablet        Take 1 tablet (20 mg) by mouth daily Take by mouth 30-60 minutes before a meal.   Refills:  0        propranolol 20 MG tablet   Commonly known as:  INDERAL   Dose:  20 mg   Quantity:  180 tablet        Take 1 tablet (20 mg) by mouth 2 times daily   Refills:  1        venlafaxine 37.5 MG tablet   Commonly known as:  EFFEXOR   Dose:  37.5 mg   Quantity:  60 tablet        Take 1 tablet (37.5 mg) by mouth daily   Refills:  1                Prescriptions were sent or printed at these locations (1 Prescription)                   [facility] - [place], [place] - [address]    [address] , [place] [address]    Telephone:  [phone]   Fax:  [phone]   Hours:                  E-Prescribed (1 of 1)         ketorolac (TORADOL) 10 MG tablet                Procedures and tests performed during your visit     Abd/pelvis CT - no contrast - Stone Protocol    CBC with platelets differential    CRP inflammation    Comprehensive metabolic panel    UA with Microscopic reflex to Culture      Orders Needing Specimen Collection     None      Pending Results     No orders found from 7/24/2018 to 7/27/2018.            Pending Culture Results     No orders found from 7/24/2018 to 7/27/2018.            Thank you for choosing [facility]       Thank you for choosing [facility] for your care. Our goal is always to provide you with excellent care. Hearing back from our patients is one way we can continue to improve our services. Please take a few minutes to complete the written survey that you may receive in the mail after you visit with us. Thank you!        [employer] Information     Boloco lets you send messages to your doctor, view your test results, renew your prescriptions, schedule appointments and more. To sign up, go to [email or web address] . Click on \"Log in\" on the left side of the screen, which will take you to the Welcome page. Then click on \"Sign " "up Now\" on the right side of the page.     You will be asked to enter the access code listed below, as well as some personal information. Please follow the directions to create your username and password.     Your access code is: [email or web address]  Expires: 2018  2:32 PM     Your access code will  in 90 days. If you need help or a new code, please call your [facility] clinic or [phone].        Care EveryWhere ID     This is your Care EveryWhere ID. This could be used by other organizations to access your [facility] medical records  [id number]        Equal Access to Services     [facility] : Lillian Mendez, madison lynch, tarah pike, brissa marlow . So [facility] [phone].    ATENCIÓN: Si habla español, tiene a crystal disposición servicios gratuitos de asistencia lingüística. [facility] [phone].    We comply with applicable federal civil rights laws and [place] laws. We do not discriminate on the basis of race, color, national origin, age, disability, sex, sexual orientation, or gender identity.            After Visit Summary       This is your record. Keep this with you and show to your community pharmacist(s) and doctor(s) at your next visit.                  "

## 2018-07-26 NOTE — ED PROVIDER NOTES
History     Chief Complaint   Patient presents with     Abdominal Pain     lower bilateral pelvic pain, left lower back pain started sunday. pt thinks its a kidney stone, hx of stone 6 mo ago. pt feeling like having to urinate but can not.     HPI  Lashae Caicedo is a 42 year old female who presents with concern of possible kidey stone. Patient states symptoms started 4 days ago . Pain localized left lower back and abdomen.    No fever . NO chills. Today started to experience difficulty with urination. Had urinary frequency up until now. Did take tylenol which didn't touch her level of pain . Pain currently 9/10 . No nausea. NO vomitting. Poor appetite secondary to discomfort. NO cough . NO cold symptoms. Denies chance of pregnancy . Has history of tubal ligation and  Endometrial ablation Was able to pass last kidney stone spontaneously  When she had 6 months ago     Problem List:    Patient Active Problem List    Diagnosis Date Noted     Depression, recurrent (H) 10/25/2017     Priority: Medium     Abnormal uterine bleeding 09/21/2017     Priority: Medium     Migraine syndrome 09/12/2017     Priority: Medium     Hx of antibiotic allergy 03/20/2017     Priority: Medium     Obesity, Class II, BMI 35-39.9 03/20/2017     Priority: Medium     TMJ (temporomandibular joint syndrome) 03/20/2017     Priority: Medium        Past Medical History:    Past Medical History:   Diagnosis Date     Abnormal uterine bleeding (AUB)        Past Surgical History:    Past Surgical History:   Procedure Laterality Date     HC ABLATION, ENDOMETRIAL, THERMAL, W/O HYSTEROSCOPIC GUIDANCE       TUBAL LIGATION  2011       Family History:    Family History   Problem Relation Age of Onset     Other - See Comments Mother      endometriosis       Social History:  Marital Status:  Single [1]  Social History   Substance Use Topics     Smoking status: Current Every Day Smoker     Packs/day: 1.00     Types: Cigarettes     Smokeless tobacco: Former  User     Alcohol use Yes      Comment: wine coolers on occasion        Medications:      nystatin (MYCOSTATIN) 572572 UNIT/GM POWD   pantoprazole (PROTONIX) 20 MG EC tablet   propranolol (INDERAL) 20 MG tablet   venlafaxine (EFFEXOR) 37.5 MG tablet   albuterol (PROAIR HFA/PROVENTIL HFA/VENTOLIN HFA) 108 (90 Base) MCG/ACT Inhaler   EPINEPHrine (EPIPEN/ADRENACLICK/OR ANY BX GENERIC EQUIV) 0.3 MG/0.3ML injection 2-pack         Review of Systems   HENT: Negative.    Respiratory: Negative.    Cardiovascular: Negative.    Gastrointestinal: Positive for abdominal pain. Negative for constipation, diarrhea and vomiting.   Genitourinary: Positive for decreased urine volume, difficulty urinating and flank pain. Negative for dyspareunia, dysuria, frequency, hematuria, pelvic pain and urgency.   Musculoskeletal: Positive for back pain.   Neurological: Negative.        Physical Exam   BP: 130/88  Pulse: 82  Temp: 98.1  F (36.7  C)  Resp: 16  SpO2: 98 %      Physical Exam   Constitutional: She appears well-developed and well-nourished. No distress.   HENT:   Head: Normocephalic and atraumatic.   Mouth/Throat: Oropharynx is clear and moist.   Neck: Normal range of motion. Neck supple.   Cardiovascular: Normal rate, regular rhythm and normal heart sounds.    Pulmonary/Chest: Effort normal and breath sounds normal. No respiratory distress. She has no wheezes. She has no rales. She exhibits no tenderness.   Abdominal: Soft. Bowel sounds are normal. She exhibits no distension and no mass. There is tenderness. There is no rebound and no guarding.   Neurological: She is alert.   Skin: Skin is warm and dry. She is not diaphoretic.   Psychiatric: She has a normal mood and affect.   Nursing note and vitals reviewed.      ED Course     ED Course     Procedures          Patient presents to ER with concern of left flank pain  Patient with history of kidney stone and believes this may be another one. Patient triaged to exam room . Vital signs  reviewed No significant abnormality. Peripheral IV inserted , fluid bolus , toradol ordered. Labs and diagnostics ordered. Patient states no relief from toradol , fentanyl 50 mcg given. Preliminary labs significant only for small blood in urine. CT abdomen and pelvis ordered. No kidney or ureteral stones. Sludge in gallbladder . Discussed results with patient . Patient angry about time to get CT results. Discussed unclear etiology of pain with patient and that pelvic ultrasound would be next recommended study . Patient declined further evaluation stating she had to get home to her daughter and will be following up with her primary DR Lewis . Patient given prescription for toradol . Discussed that she can not use this together with ibuprofen . Alternate with tylenol only .        No results found for this or any previous visit (from the past 24 hour(s)).    Medications   0.9% sodium chloride BOLUS (not administered)       Assessments & Plan (with Medical Decision Making)     I have reviewed the nursing notes.    I have reviewed the findings, diagnosis, plan and need for follow up with the patient.      New Prescriptions    No medications on file       Final diagnoses:   None   (R10.84) Abdominal pain, generalized  Comment:   Plan As above. Follow up with DR Lewis.         7/26/2018   [facility]     Cris Bryant MD  07/26/18 9060

## 2018-07-26 NOTE — ED AVS SNAPSHOT
[facility]    [address] [address]    Phone:  [phone]                                       Lashae Caicedo   MRN: [id number]    Department:  [facility]   Date of Visit:  7/26/2018           After Visit Summary Signature Page     I have received my discharge instructions, and my questions have been answered. I have discussed any challenges I see with this plan with the nurse or doctor.    ..........................................................................................................................................  Patient/Patient Representative Signature      ..........................................................................................................................................  Patient Representative Print Name and Relationship to Patient    ..................................................               ................................................  Date                                            Time    ..........................................................................................................................................  Reviewed by Signature/Title    ...................................................              ..............................................  Date                                                            Time

## 2018-07-27 NOTE — DISCHARGE INSTRUCTIONS

## 2018-07-27 NOTE — ED NOTES
Discharge instructions completed with patient with no questions or concerns. Dr. Pettit offered pelvic ultrasound, patient declined. Patient to follow up with PCP. Vital signs stable. Aware of Rx to .

## 2018-07-27 NOTE — ED NOTES
"Spoke with pt.  Pt is very angry and upset with waiting time.  She states she will just leave and go see her regular doctor.  Pt is c/o hunger and thirst, stating she has not eaten all day.  She says \"i just wanted to get checked, I didn't know it was going to take this long.\"  Writer apologized and explained that CT results do not take this long but that today delayed results were due to an error.  Pt explained everything she has to get home to do and expressed frustration. Offered another dose of pain medication to help relieve pt's pain.  Pt states \"it is just making me drowsy and I still feel the throbbing.\"  Will notify MD.  Writer explained that results are in and waiting for MD to read and speak with pt.   "

## 2018-07-31 ENCOUNTER — HOSPITAL ENCOUNTER (EMERGENCY)
Facility: HOSPITAL | Age: 43
Discharge: HOME | End: 2018-07-31
Attending: PHYSICIAN ASSISTANT | Admitting: PHYSICIAN ASSISTANT
Payer: OTHER MISCELLANEOUS

## 2018-07-31 ENCOUNTER — APPOINTMENT (OUTPATIENT)
Dept: GENERAL RADIOLOGY | Facility: HOSPITAL | Age: 43
End: 2018-07-31
Attending: PHYSICIAN ASSISTANT
Payer: OTHER MISCELLANEOUS

## 2018-07-31 VITALS
OXYGEN SATURATION: 96 % | DIASTOLIC BLOOD PRESSURE: 100 MMHG | RESPIRATION RATE: 14 BRPM | TEMPERATURE: 98.2 F | SYSTOLIC BLOOD PRESSURE: 144 MMHG

## 2018-07-31 DIAGNOSIS — S70.01XA CONTUSION OF RIGHT HIP, INITIAL ENCOUNTER: ICD-10-CM

## 2018-07-31 PROCEDURE — 72170 X-RAY EXAM OF PELVIS: CPT | Mod: TC

## 2018-07-31 PROCEDURE — G0463 HOSPITAL OUTPT CLINIC VISIT: HCPCS

## 2018-07-31 PROCEDURE — 99213 OFFICE O/P EST LOW 20 MIN: CPT | Performed by: PHYSICIAN ASSISTANT

## 2018-07-31 RX ORDER — KETOROLAC TROMETHAMINE 10 MG/1
10 TABLET, FILM COATED ORAL EVERY 6 HOURS PRN
COMMUNITY
End: 2018-08-17

## 2018-07-31 ASSESSMENT — ENCOUNTER SYMPTOMS
NECK STIFFNESS: 0
CARDIOVASCULAR NEGATIVE: 1
CONSTITUTIONAL NEGATIVE: 1
ARTHRALGIAS: 1
NECK PAIN: 0
MYALGIAS: 1
NEUROLOGICAL NEGATIVE: 1
PSYCHIATRIC NEGATIVE: 1

## 2018-07-31 NOTE — ED PROVIDER NOTES
History     Chief Complaint   Patient presents with     Hip Pain     rt hip pain since yesterday am, notes went to sit on a chair and it rolled away and she ended up on the floor     The history is provided by the patient. No  was used.     Lashae Caicedo is a 42 year old female who states yesterday she fell off of her chair at work. States she went to sit down and it rolled away from her and she landed on the ground. Denies any head injury. No neck pain. Has right hip pain which makes it hard for her to sit at work      PMH/PSH/FHX: reviewed      Medications:    reviewed    Review of Systems   Constitutional: Negative.    HENT: Negative.    Cardiovascular: Negative.    Musculoskeletal: Positive for arthralgias and myalgias. Negative for neck pain and neck stiffness.   Neurological: Negative.    Psychiatric/Behavioral: Negative.        Physical Exam   BP: 144/100  Heart Rate: 89  Temp: 98.2  F (36.8  C)  Resp: 14  SpO2: 96 %      Physical Exam   Constitutional: She is oriented to person, place, and time. She appears well-developed and well-nourished. No distress.   Cardiovascular: Normal rate.    Pulmonary/Chest: Effort normal.   Musculoskeletal:   Right hip: moderate TTP over greater trochanter area, minimal ecchymosis approx 3 cm x 3 cm. +AFROM, 4/5 strength due to pain, m/n/v intact. No erythema. It is difficult to determine if there is any edema due to the pt's body habitus    Neurological: She is alert and oriented to person, place, and time.   Skin: She is not diaphoretic.   Psychiatric: She has a normal mood and affect.   Nursing note and vitals reviewed.      ED Course     ED Course     Procedures        Results for orders placed or performed during the hospital encounter of 07/31/18 (from the past 24 hour(s))   XR Pelvis 1/2 Views    Narrative    PROCEDURE: XR PELVIS 1/2 VW 7/31/2018 4:55 PM    HISTORY: right hip pain;     COMPARISONS: None.    TECHNIQUE: Pelvis one view    FINDINGS:  No acute fracture or dislocation. Joint spaces are  maintained.         Impression    IMPRESSION: No acute fracture.    RALF VILLEGAS MD         Assessments & Plan (with Medical Decision Making)     I have reviewed the nursing notes.    I have reviewed the findings, diagnosis, plan and need for follow up with the patient.      Final diagnoses:   Contusion of right hip, initial encounter         Work excuse x 2 days  Apply ice at least three times a day x 20 minutes.   Patient verbally educated and given appropriate education sheets for the diagnoses and has no questions.  Take medications as directed. Pt already has toradol  Follow up with your Primary Care provider if symptoms increase and for all further work limitations.  if further concerns develop, return to the ER  Lauren Doshi Certified   Physician Assistant  7/31/2018  5:25 PM  URGENT CARE CLINIC    7/31/2018   [facility]     Lauren Doshi PA  07/31/18 2434

## 2018-07-31 NOTE — ED TRIAGE NOTES
Pt presents today with right hip pain after having fallen yesterday. She did take Toradol this am. This is a work related injury she sustained yesterday when she tried to sit in a chair and it rolled out from under her.

## 2018-07-31 NOTE — ED AVS SNAPSHOT
[facility]    [address] [address]    Phone:  [phone]                                       Lashae Caicedo   MRN: [id number]    Department:  [facility]   Date of Visit:  7/31/2018           Patient Information     Date Of Birth          1975        Your diagnoses for this visit were:     Contusion of right hip, initial encounter        You were seen by Lauren Doshi PA.      Follow-up Information     Follow up with Melyssa Lewis MD.    Specialty:  Family Practice    Why:  for all further work limitations as needed    Contact information:    [address]  [facility] [address]  [phone]          Follow up with [facility].    Specialty:  EMERGENCY MEDICINE    Why:  If further concerns develop    Contact information:    [address] [address] [phone]    Additional information:    From [facility]: Take [address]. Turn left at [address]. Turn left at the first stoplight on [address]. At the first stop sign, take a right onto [address]. Take a left into the parking lot and continue through until you reach the North enterance of the building.       From [place]: Take [address]. Take the [address] ramp towards [place]. Turn left onto [address]. Take a slight right onto [address]. Turn left at the first stoplight on [address]. At the first stop sign, take a right onto [address]. Take a left into the parking lot and continue through until you reach the North enterance of the building.       From [place]: Take [address]. Take a right at [address]. At the first stop sign, take a right onto [address]. Take a left into the parking lot and continue through until you reach the North enterance of the building.       Discharge References/Attachments     BONE BRUISE (BONE CONTUSION), UNDERSTANDING (ENGLISH)    SOFT  TISSUE CONTUSION (ENGLISH)    HIP CONTUSION (ENGLISH)      Your next 10 appointments already scheduled     Sep 21, 2018  4:00 PM CDT   (Arrive by 3:45 PM)   SHORT with Melyssa Lewis MD   [facility] ([facility] )    [place] [address]   [phone]                 Review of your medicines      Our records show that you are taking the medicines listed below. If these are incorrect, please call your family doctor or clinic.        Dose / Directions Last dose taken    albuterol 108 (90 Base) MCG/ACT Inhaler   Commonly known as:  PROAIR HFA/PROVENTIL HFA/VENTOLIN HFA   Dose:  2 puff        Inhale 2 puffs into the lungs   Refills:  0        EPINEPHrine 0.3 MG/0.3ML injection 2-pack   Commonly known as:  EPIPEN/ADRENACLICK/or ANY BX GENERIC EQUIV   Dose:  0.3 mg        Inject 0.3 mg into the muscle   Refills:  0        ketorolac 10 MG tablet   Commonly known as:  TORADOL   Dose:  10 mg        Take 10 mg by mouth every 6 hours as needed for moderate pain   Refills:  0        nystatin 658052 UNIT/GM Powd   Commonly known as:  MYCOSTATIN   Quantity:  30 g        Apply topically 3 times daily as needed   Refills:  1        pantoprazole 20 MG EC tablet   Commonly known as:  PROTONIX   Dose:  20 mg   Quantity:  60 tablet        Take 1 tablet (20 mg) by mouth daily Take by mouth 30-60 minutes before a meal.   Refills:  0        propranolol 20 MG tablet   Commonly known as:  INDERAL   Dose:  20 mg   Quantity:  180 tablet        Take 1 tablet (20 mg) by mouth 2 times daily   Refills:  1                Procedures and tests performed during your visit     XR Pelvis 1/2 Views      Orders Needing Specimen Collection     None      Pending Results     No orders found from 7/29/2018 to 8/1/2018.            Pending Culture Results     No orders found from 7/29/2018 to 8/1/2018.            Thank you for choosing [facility]       Thank you for choosing [facility] for your care. Our goal is  "always to provide you with excellent care. Hearing back from our patients is one way we can continue to improve our services. Please take a few minutes to complete the written survey that you may receive in the mail after you visit with us. Thank you!        Synker Information     Synker lets you send messages to your doctor, view your test results, renew your prescriptions, schedule appointments and more. To sign up, go to [email or web address] . Click on \"Log in\" on the left side of the screen, which will take you to the Welcome page. Then click on \"Sign up Now\" on the right side of the page.     You will be asked to enter the access code listed below, as well as some personal information. Please follow the directions to create your username and password.     Your access code is: [email or web address]  Expires: 2018  2:32 PM     Your access code will  in 90 days. If you need help or a new code, please call your [facility] clinic or [phone].        Care EveryWhere ID     This is your Care EveryWhere ID. This could be used by other organizations to access your [facility] medical records  [id number]        Equal Access to Services     SHANTHI BURTON : Lillian Mendez, walizette lynch, qashayne pike, brissa nichols. So [facility] [phone].    ATENCIÓN: Si habla español, tiene a crystal disposición servicios gratuitos de asistencia lingüística. Elizabeth al [phone].    We comply with applicable federal civil rights laws and [place] laws. We do not discriminate on the basis of race, color, national origin, age, disability, sex, sexual orientation, or gender identity.            After Visit Summary       This is your record. Keep this with you and show to your community pharmacist(s) and doctor(s) at your next visit.                  "

## 2018-07-31 NOTE — LETTER
[facility]  [address] [address]  Phone: [phone]    July 31, 2018        Lashae Caicedo  [address]   [facility] [address]          To whom it may concern:    RE: Lashae Caicedo    Patient was seen and treated today at our clinic.    Please excuse her from work on 01-02 August 2018. She may return to work on   03 August 2018, with no limitations.      Sincerely,        Lauren Doshi Certified  Physician Assistant  7/31/2018  5:11 PM  URGENT CARE CLINIC

## 2018-07-31 NOTE — ED AVS SNAPSHOT
[facility]    [address] [address]    Phone:  [phone]                                       Lashae Caicedo   MRN: [id number]    Department:  [facility]   Date of Visit:  7/31/2018           After Visit Summary Signature Page     I have received my discharge instructions, and my questions have been answered. I have discussed any challenges I see with this plan with the nurse or doctor.    ..........................................................................................................................................  Patient/Patient Representative Signature      ..........................................................................................................................................  Patient Representative Print Name and Relationship to Patient    ..................................................               ................................................  Date                                            Time    ..........................................................................................................................................  Reviewed by Signature/Title    ...................................................              ..............................................  Date                                                            Time

## 2018-08-02 ENCOUNTER — TELEPHONE (OUTPATIENT)
Dept: FAMILY MEDICINE | Facility: OTHER | Age: 43
End: 2018-08-02

## 2018-08-02 NOTE — TELEPHONE ENCOUNTER
9:05 AM    Reason for Call: OVERBOOK    Patient is having the following symptoms: ER follow up/lower LT abdominal pain for 2 weeks.    The patient is requesting an appointment for Friday with Dr Lewis or another day after 4:00 pm.    Was an appointment offered for this call? Yes  If yes : Appointment type short              Date  08/14/18    Preferred method for responding to this message: Telephone Call  What is your phone number ?  [phone]    If we cannot reach you directly, may we leave a detailed response at the number you provided? Yes    Can this message wait until your PCP/provider returns, if unavailable today? Not applicable    Janine Jorge

## 2018-08-07 NOTE — PROGRESS NOTES
SUBJECTIVE:   Lashae Caicedo is a 42 year old female who presents to clinic today for the following health issues:      ED/UC Followup:    Facility:  [facility]  Date of visit: 7/26/18 & 7/31/18  Reason for visit: Contusion of right hip and generalized abdominal pain  Current Status: feeling better.          discharge      Duration: none    Description (location/character/radiation): vagina    Intensity:  none    Accompanying signs and symptoms:  Had an ablation for endometryosis and since then has had some vaginal discharge and has to change underwear 3 times a day.     History (similar episodes/previous evaluation): None    Precipitating or alleviating factors: None    Therapies tried and outcome: None     Rash      Duration: since sunday    Description  Location: chest, shoulders,back starting to go to lower back    Itching: started off itchy but now are just painful. Red large bumps     Intensity:  moderate    Accompanying signs and symptoms: very red large bump and painful    History (similar episodes/previous evaluation): None    Precipitating or alleviating factors:  New exposures:  None  Recent travel: no      Therapies tried and outcome: benadryl and various creams, and an oatmeal bath     Problem list and histories reviewed & adjusted, as indicated.  Additional history: as documented    Labs reviewed in EPIC    Reviewed and updated as needed this visit by clinical staff  Tobacco  Allergies  Meds  Problems  Med Hx  Surg Hx  Fam Hx  Soc Hx        Reviewed and updated as needed this visit by Provider  Allergies  Meds  Problems  Med Hx  Surg Hx         ROS:  Constitutional, HEENT, cardiovascular, pulmonary, gi and gu systems are negative, except as otherwise noted.    OBJECTIVE:     /74  Pulse 98  Temp 98.8  F (37.1  C) (Tympanic)  Wt 225 lb (102.1 kg)  SpO2 98%  BMI 38.62 kg/m2  Body mass index is 38.62 kg/(m^2).  GENERAL: healthy, alert and no distress  NECK: no adenopathy, no asymmetry,  masses, or scars and thyroid normal to palpation  RESP: lungs clear to auscultation - no rales, rhonchi or wheezes  CV: regular rate and rhythm, normal S1 S2, no S3 or S4, no murmur, click or rub, no peripheral edema and peripheral pulses strong  ABDOMEN: soft, nontender, no hepatosplenomegaly, no masses and bowel sounds normal   (female): normal female external genitalia, normal urethral meatus, vaginal mucosa, normal cervix/adnexa/uterus without masses or discharge  SKIN: Raised erythematous papules over upper chest, upper and lower back, now on outer arms as well.     Diagnostic Test Results:  Results for orders placed or performed in visit on 08/10/18   Wet prep   Result Value Ref Range    Specimen Description Vagina     Wet Prep Many  WBC'S seen       Wet Prep No Trichomonas seen     Wet Prep No clue cells seen     Wet Prep No yeast seen        ASSESSMENT/PLAN:     1. Folliculitis  Rash over chest and upper back, now spreading to arms and lower back. Itchy. OTC steroid cream for itch only, discontinue other creams she is using.   - cephALEXin (KEFLEX) 250 MG capsule; Take 1 capsule (250 mg) by mouth 4 times daily for 7 days  Dispense: 28 capsule; Refill: 0    2. Lower abdominal pain / Vaginal discharge  Pain resolved, ongoing thin discharge from ablation. Pelvic without abl, pain. Will get US and likely refer to Gyn for follow up  - Wet prep  - US Pelvic Complete with Transvaginal; Future    4. Gout, unspecified cause, unspecified chronicity, unspecified site  History of this. Symptoms resolved. See previous note. Future lab placed for monitoring.   - Uric acid; Future    5. Abdominal pain, epigastric  Refilled  - pantoprazole (PROTONIX) 20 MG EC tablet; Take 1 tablet (20 mg) by mouth daily Take by mouth 30-60 minutes before a meal.  Dispense: 60 tablet; Refill: 0    6. Mild intermittent asthma without complication  REfilled inhaler  - albuterol (PROAIR HFA/PROVENTIL HFA/VENTOLIN HFA) 108 (90 Base) MCG/ACT  inhaler; Inhale 2 puffs into the lungs every 6 hours as needed for shortness of breath / dyspnea or wheezing  Dispense: 1 Inhaler; Refill: 3    7. Hx of antibiotic allergy  Refilled epi pens  - EPINEPHrine (EPIPEN/ADRENACLICK/OR ANY BX GENERIC EQUIV) 0.3 MG/0.3ML injection 2-pack; Inject 0.3 mLs (0.3 mg) into the muscle as needed for anaphylaxis  Dispense: 0.6 mL; Refill: 0    Melyssa Lewis MD  [facility]

## 2018-08-10 ENCOUNTER — OFFICE VISIT (OUTPATIENT)
Dept: FAMILY MEDICINE | Facility: OTHER | Age: 43
End: 2018-08-10
Attending: FAMILY MEDICINE
Payer: COMMERCIAL

## 2018-08-10 VITALS
SYSTOLIC BLOOD PRESSURE: 124 MMHG | HEART RATE: 98 BPM | TEMPERATURE: 98.8 F | BODY MASS INDEX: 38.62 KG/M2 | DIASTOLIC BLOOD PRESSURE: 74 MMHG | OXYGEN SATURATION: 98 % | WEIGHT: 225 LBS

## 2018-08-10 DIAGNOSIS — N89.8 VAGINAL DISCHARGE: ICD-10-CM

## 2018-08-10 DIAGNOSIS — R10.13 ABDOMINAL PAIN, EPIGASTRIC: ICD-10-CM

## 2018-08-10 DIAGNOSIS — R10.30 LOWER ABDOMINAL PAIN: ICD-10-CM

## 2018-08-10 DIAGNOSIS — J45.20 MILD INTERMITTENT ASTHMA WITHOUT COMPLICATION: ICD-10-CM

## 2018-08-10 DIAGNOSIS — Z88.1 HX OF ANTIBIOTIC ALLERGY: ICD-10-CM

## 2018-08-10 DIAGNOSIS — L73.9 FOLLICULITIS: Primary | ICD-10-CM

## 2018-08-10 DIAGNOSIS — M10.9 GOUT, UNSPECIFIED CAUSE, UNSPECIFIED CHRONICITY, UNSPECIFIED SITE: ICD-10-CM

## 2018-08-10 LAB
SPECIMEN SOURCE: NORMAL
WET PREP SPEC: NORMAL

## 2018-08-10 PROCEDURE — 87210 SMEAR WET MOUNT SALINE/INK: CPT | Mod: ZL | Performed by: FAMILY MEDICINE

## 2018-08-10 PROCEDURE — 99213 OFFICE O/P EST LOW 20 MIN: CPT | Performed by: FAMILY MEDICINE

## 2018-08-10 PROCEDURE — G0463 HOSPITAL OUTPT CLINIC VISIT: HCPCS

## 2018-08-10 RX ORDER — ALBUTEROL SULFATE 90 UG/1
2 AEROSOL, METERED RESPIRATORY (INHALATION) EVERY 6 HOURS PRN
Qty: 1 INHALER | Refills: 3 | Status: SHIPPED | OUTPATIENT
Start: 2018-08-10

## 2018-08-10 RX ORDER — PANTOPRAZOLE SODIUM 20 MG/1
20 TABLET, DELAYED RELEASE ORAL DAILY
Qty: 60 TABLET | Refills: 0 | Status: SHIPPED | OUTPATIENT
Start: 2018-08-10 | End: 2018-12-11

## 2018-08-10 RX ORDER — EPINEPHRINE 0.3 MG/.3ML
0.3 INJECTION SUBCUTANEOUS PRN
Qty: 0.6 ML | Refills: 0 | Status: SHIPPED | OUTPATIENT
Start: 2018-08-10 | End: 2019-08-28

## 2018-08-10 ASSESSMENT — ANXIETY QUESTIONNAIRES
5. BEING SO RESTLESS THAT IT IS HARD TO SIT STILL: NEARLY EVERY DAY
2. NOT BEING ABLE TO STOP OR CONTROL WORRYING: NEARLY EVERY DAY
4. TROUBLE RELAXING: NEARLY EVERY DAY
6. BECOMING EASILY ANNOYED OR IRRITABLE: NEARLY EVERY DAY
1. FEELING NERVOUS, ANXIOUS, OR ON EDGE: NEARLY EVERY DAY
GAD7 TOTAL SCORE: 20
7. FEELING AFRAID AS IF SOMETHING AWFUL MIGHT HAPPEN: MORE THAN HALF THE DAYS
3. WORRYING TOO MUCH ABOUT DIFFERENT THINGS: NEARLY EVERY DAY

## 2018-08-10 ASSESSMENT — PAIN SCALES - GENERAL: PAINLEVEL: MILD PAIN (2)

## 2018-08-10 NOTE — MR AVS SNAPSHOT
After Visit Summary   8/10/2018    Lashae Caicedo    MRN: [id number]           Patient Information     Date Of Birth          1975        Visit Information        Provider Department      8/10/2018 2:30 PM Melyssa Lewis MD [facility]        Today's Diagnoses     Folliculitis    -  1    Lower abdominal pain        Vaginal discharge        Gout, unspecified cause, unspecified chronicity, unspecified site        Abdominal pain, epigastric        Mild intermittent asthma without complication        Hx of antibiotic allergy           Follow-ups after your visit        Your next 10 appointments already scheduled     Aug 17, 2018  4:00 PM CDT   (Arrive by 3:45 PM)   Office Visit with Melyssa Lewis MD   [facility] ([facility] )    [address]  [place] [address] [phone]           Bring a current list of meds and any records pertaining to this visit. For Physicals, please bring immunization records and any forms needing to be filled out. Please arrive 10 minutes early to complete paperwork.            Sep 21, 2018  4:00 PM CDT   (Arrive by 3:45 PM)   SHORT with Melyssa Lewis MD   [facility] ([facility] [place] )    [address] [place] [address]   [phone]              Who to contact     If you have questions or need follow up information about today's clinic visit or your schedule please contact [facility] directly at [phone].  Normal or non-critical lab and imaging results will be communicated to you by MyChart, letter or phone within 4 business days after the clinic has received the results. If you do not hear from us within 7 days, please contact the clinic through MyChart or phone. If you have a critical or abnormal lab result, we will notify you by phone as soon as possible.  Submit refill requests through [employer] or call your pharmacy and they will forward the refill request  to us. Please allow 3 business days for your refill to be completed.          Additional Information About Your Visit        Care EveryWhere ID     This is your Care EveryWhere ID. This could be used by other organizations to access your [facility] medical records  [id number]        Your Vitals Were     Pulse Temperature Pulse Oximetry BMI (Body Mass Index)          98 98.8  F (37.1  C) (Tympanic) 98% 38.62 kg/m2         Blood Pressure from Last 3 Encounters:   08/10/18 124/74   07/31/18 144/100   07/26/18 126/79    Weight from Last 3 Encounters:   08/10/18 225 lb (102.1 kg)   07/10/18 220 lb (99.8 kg)   06/22/18 226 lb (102.5 kg)              We Performed the Following     Wet prep          Today's Medication Changes          These changes are accurate as of 8/10/18 11:59 PM.  If you have any questions, ask your nurse or doctor.               Start taking these medicines.        Dose/Directions    cephalexin 250 MG capsule   Commonly known as:  KEFLEX   Used for:  Folliculitis   Started by:  Melyssa Lewis MD        Dose:  250 mg   Take 1 capsule (250 mg) by mouth 4 times daily for 7 days   Quantity:  28 capsule   Refills:  0         These medicines have changed or have updated prescriptions.        Dose/Directions    albuterol 108 (90 Base) MCG/ACT inhaler   Commonly known as:  PROAIR HFA/PROVENTIL HFA/VENTOLIN HFA   This may have changed:    - when to take this  - reasons to take this   Used for:  Mild intermittent asthma without complication   Changed by:  Melyssa Lewis MD        Dose:  2 puff   Inhale 2 puffs into the lungs every 6 hours as needed for shortness of breath / dyspnea or wheezing   Quantity:  1 Inhaler   Refills:  3       EPINEPHrine 0.3 MG/0.3ML injection 2-pack   Commonly known as:  EPIPEN/ADRENACLICK/or ANY BX GENERIC EQUIV   This may have changed:    - when to take this  - reasons to take this   Used for:  Hx of antibiotic allergy   Changed by:  Melyssa Lewis MD        Dose:  0.3 mg    Inject 0.3 mLs (0.3 mg) into the muscle as needed for anaphylaxis   Quantity:  0.6 mL   Refills:  0            Where to get your medicines      These medications were sent to [id number] - [place], [place] - [address]   [address] , [facility] [address]     Phone:  [phone]     albuterol 108 (90 Base) MCG/ACT inhaler    cephalexin 250 MG capsule    EPINEPHrine 0.3 MG/0.3ML injection 2-pack    pantoprazole 20 MG EC tablet                Primary Care Provider Office Phone # Fax #    Melyssa Lewis [phone] [address] [facility] [address]        Equal Access to Services     SHANTHI BURTON : Lillian Mendez, madison lynch, tarah pike, brissa marlow . So [facility] [phone].    ATENCIÓN: Si habla español, tiene a crystal disposición servicios gratuitos de asistencia lingüística. Llame al [phone].    We comply with applicable federal civil rights laws and [place] laws. We do not discriminate on the basis of race, color, national origin, age, disability, sex, sexual orientation, or gender identity.            Thank you!     Thank you for choosing [facility]  for your care. Our goal is always to provide you with excellent care. Hearing back from our patients is one way we can continue to improve our services. Please take a few minutes to complete the written survey that you may receive in the mail after your visit with us. Thank you!             Your Updated Medication List - Protect others around you: Learn how to safely use, store and throw away your medicines at www.disposemymeds.org.          This list is accurate as of 8/10/18 11:59 PM.  Always use your most recent med list.                   Brand Name Dispense Instructions for use Diagnosis    albuterol 108 (90 Base) MCG/ACT inhaler    PROAIR HFA/PROVENTIL HFA/VENTOLIN HFA    1 Inhaler    Inhale 2 puffs into the lungs every 6 hours as needed for  shortness of breath / dyspnea or wheezing    Mild intermittent asthma without complication       cephalexin 250 MG capsule    KEFLEX    28 capsule    Take 1 capsule (250 mg) by mouth 4 times daily for 7 days    Folliculitis       EPINEPHrine 0.3 MG/0.3ML injection 2-pack    EPIPEN/ADRENACLICK/or ANY BX GENERIC EQUIV    0.6 mL    Inject 0.3 mLs (0.3 mg) into the muscle as needed for anaphylaxis    Hx of antibiotic allergy       ketorolac 10 MG tablet    TORADOL     Take 10 mg by mouth every 6 hours as needed for moderate pain        nystatin [id number] UNIT/GM Powd    MYCOSTATIN    30 g    Apply topically 3 times daily as needed    Candidal intertrigo       pantoprazole 20 MG EC tablet    PROTONIX    60 tablet    Take 1 tablet (20 mg) by mouth daily Take by mouth 30-60 minutes before a meal.    Abdominal pain, epigastric       propranolol 20 MG tablet    INDERAL    180 tablet    Take 1 tablet (20 mg) by mouth 2 times daily    Chronic migraine without aura without status migrainosus, not intractable

## 2018-08-10 NOTE — LETTER
My Depression Action Plan  Name: Lashae Caicedo   Date of Birth 1975  Date: 8/10/2018    My doctor: Melyssa Lewis   My clinic: [facility] [place]  [place] [address]  [phone]          GREEN    ZONE   Good Control    What it looks like:     Things are going generally well. You have normal up s and down s. You may even feel depressed from time to time, but bad moods usually last less than a day.   What you need to do:  1. Continue to care for yourself (see self care plan)  2. Check your depression survival kit and update it as needed  3. Follow your physician s recommendations including any medication.  4. Do not stop taking medication unless you consult with your physician first.           YELLOW         ZONE Getting Worse    What it looks like:     Depression is starting to interfere with your life.     It may be hard to get out of bed; you may be starting to isolate yourself from others.    Symptoms of depression are starting to last most all day and this has happened for several days.     You may have suicidal thoughts but they are not constant.   What you need to do:     1. Call your care team, your response to treatment will improve if you keep your care team informed of your progress. Yellow periods are signs an adjustment may need to be made.     2. Continue your self-care, even if you have to fake it!    3. Talk to someone in your support network    4. Open up your depression survival kit           RED    ZONE Medical Alert - Get Help    What it looks like:     Depression is seriously interfering with your life.     You may experience these or other symptoms: You can t get out of bed most days, can t work or engage in other necessary activities, you have trouble taking care of basic hygiene, or basic responsibilities, thoughts of suicide or death that will not go away, self-injurious behavior.     What you need to do:  1. Call your care team and request a  same-day appointment. If they are not available (weekends or after hours) call your local crisis line, emergency room or 911.            Depression Self Care Plan / Survival Kit    Self-Care for Depression  Here s the deal. Your body and mind are really not as separate as most people think.  What you do and think affects how you feel and how you feel influences what you do and think. This means if you do things that people who feel good do, it will help you feel better.  Sometimes this is all it takes.  There is also a place for medication and therapy depending on how severe your depression is, so be sure to consult with your medical provider and/ or Behavioral Health Consultant if your symptoms are worsening or not improving.     In order to better manage my stress, I will:    Exercise  Get some form of exercise, every day. This will help reduce pain and release endorphins, the  feel good  chemicals in your brain. This is almost as good as taking antidepressants!  This is not the same as joining a gym and then never going! (they count on that by the way ) It can be as simple as just going for a walk or doing some gardening, anything that will get you moving.      Hygiene   Maintain good hygiene (Get out of bed in the morning, Make your bed, Brush your teeth, Take a shower, and Get dressed like you were going to work, even if you are unemployed).  If your clothes don't fit try to get ones that do.    Diet  I will strive to eat foods that are good for me, drink plenty of water, and avoid excessive sugar, caffeine, alcohol, and other mood-altering substances.  Some foods that are helpful in depression are: complex carbohydrates, B vitamins, flaxseed, fish or fish oil, fresh fruits and vegetables.    Psychotherapy  I agree to participate in Individual Therapy (if recommended).    Medication  If prescribed medications, I agree to take them.  Missing doses can result in serious side effects.  I understand that drinking  alcohol, or other illicit drug use, may cause potential side effects.  I will not stop my medication abruptly without first discussing it with my provider.    Staying Connected With Others  I will stay in touch with my friends, family members, and my primary care provider/team.    Use your imagination  Be creative.  We all have a creative side; it doesn t matter if it s oil painting, sand castles, or mud pies! This will also kick up the endorphins.    Witness Beauty  (AKA stop and smell the roses) Take a look outside, even in mid-winter. Notice colors, textures. Watch the squirrels and birds.     Service to others  Be of service to others.  There is always someone else in need.  By helping others we can  get out of ourselves  and remember the really important things.  This also provides opportunities for practicing all the other parts of the program.    Humor  Laugh and be silly!  Adjust your TV habits for less news and crime-drama and more comedy.    Control your stress  Try breathing deep, massage therapy, biofeedback, and meditation. Find time to relax each day.     My support system    Clinic Contact:  Phone number:    Contact 1:  Phone number:    Contact 2:  Phone number:    Yarsanism/:  Phone number:    Therapist:  Phone number:    Local crisis center:    Phone number:    Other community support:  Phone number:

## 2018-08-10 NOTE — NURSING NOTE
"Chief Complaint   Patient presents with     ER F/U       Initial /74  Pulse 98  Temp 98.8  F (37.1  C) (Tympanic)  Wt 225 lb (102.1 kg)  SpO2 98%  BMI 38.62 kg/m2 Estimated body mass index is 38.62 kg/(m^2) as calculated from the following:    Height as of 7/10/18: 5' 4\" (1.626 m).    Weight as of this encounter: 225 lb (102.1 kg).  Medication Reconciliation: complete    Rakel Robin MA    "

## 2018-08-11 ASSESSMENT — ANXIETY QUESTIONNAIRES: GAD7 TOTAL SCORE: 20

## 2018-08-11 ASSESSMENT — PATIENT HEALTH QUESTIONNAIRE - PHQ9: SUM OF ALL RESPONSES TO PHQ QUESTIONS 1-9: 13

## 2018-08-14 ENCOUNTER — TELEPHONE (OUTPATIENT)
Dept: FAMILY MEDICINE | Facility: OTHER | Age: 43
End: 2018-08-14

## 2018-08-14 NOTE — TELEPHONE ENCOUNTER
Spoke with Inez via telephone. Requesting documentation/letter of any restrictions patient has for work.    Fax number [phone]  Atten: Srinivas Jones LPN

## 2018-08-14 NOTE — TELEPHONE ENCOUNTER
3:06 PM    Reason for Call: Phone Call    Description: Inez the triage nurse called and states that she got a phone number call from the pts employer is requesting paperwork saying she can sit at work instead of standing.    Was an appointment offered for this call? No  If yes : Appointment type              Date    Preferred method for responding to this message: Telephone Call  What is your phone number ?    If we cannot reach you directly, may we leave a detailed response at the number you provided? Yes    Can this message wait until your PCP/provider returns, if available today? Not applicable, PCP is in     [facility]

## 2018-08-14 NOTE — TELEPHONE ENCOUNTER
Inez (triage nurse) notified - unable to address restrictions with out evaluating patients injuries. Patient was seen since injury occurred, but was never addressed at the last appointment. Patient will be seen Friday 8/17/18 and evaluation of injuries and what restrictions may apply will be discussed then.

## 2018-08-17 ENCOUNTER — OFFICE VISIT (OUTPATIENT)
Dept: FAMILY MEDICINE | Facility: OTHER | Age: 43
End: 2018-08-17
Attending: FAMILY MEDICINE
Payer: OTHER MISCELLANEOUS

## 2018-08-17 VITALS
HEART RATE: 80 BPM | OXYGEN SATURATION: 96 % | DIASTOLIC BLOOD PRESSURE: 82 MMHG | SYSTOLIC BLOOD PRESSURE: 112 MMHG | TEMPERATURE: 98.6 F | WEIGHT: 229.8 LBS | BODY MASS INDEX: 39.45 KG/M2

## 2018-08-17 DIAGNOSIS — M54.41 ACUTE RIGHT-SIDED LOW BACK PAIN WITH RIGHT-SIDED SCIATICA: Primary | ICD-10-CM
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
## 2018-08-17 PROCEDURE — 99213 OFFICE O/P EST LOW 20 MIN: CPT | Performed by: FAMILY MEDICINE

## 2018-08-17 RX ORDER — NAPROXEN 500 MG/1
500 TABLET ORAL 2 TIMES DAILY PRN
Qty: 60 TABLET | Refills: 1 | Status: SHIPPED | OUTPATIENT
Start: 2018-08-17 | End: 2018-12-11

## 2018-08-17 RX ORDER — METHYLPREDNISOLONE 4 MG
TABLET, DOSE PACK ORAL
Qty: 21 TABLET | Refills: 0 | Status: SHIPPED | OUTPATIENT
Start: 2018-08-17 | End: 2018-09-24

## 2018-08-17 ASSESSMENT — PAIN SCALES - GENERAL: PAINLEVEL: SEVERE PAIN (6)

## 2018-08-17 NOTE — PROGRESS NOTES
"  SUBJECTIVE:   Lashae Caicedo is a 42 year old female who presents to clinic today for the following health issues:    ED/UC Followup:    Facility: [facility]  Date of visit: 7/31/18  Reason for visit: Contusion of right hip  Current Status: Still in pain, pain is rated at a 6/10         {additional problems for provider to add:203241}    Problem list and histories reviewed & adjusted, as indicated.  Additional history: {NONE - AS DOCUMENTED:705294::\"as documented\"}    {HIST REVIEW/ LINKS 2:795306}    Reviewed and updated as needed this visit by clinical staff       Reviewed and updated as needed this visit by Provider         {PROVIDER CHARTING PREFERENCE:561123}  "

## 2018-08-17 NOTE — NURSING NOTE
"Chief Complaint   Patient presents with     Urgent Care     Follow up     Work Comp       Initial /82 (BP Location: Right arm, Patient Position: Chair, Cuff Size: Adult Large)  Pulse 80  Temp 98.6  F (37  C) (Tympanic)  Wt 229 lb 12.8 oz (104.2 kg)  SpO2 96%  BMI 39.45 kg/m2 Estimated body mass index is 39.45 kg/(m^2) as calculated from the following:    Height as of 7/10/18: 5' 4\" (1.626 m).    Weight as of this encounter: 229 lb 12.8 oz (104.2 kg).  Medication Reconciliation: complete    Rosanne Hussein'  "

## 2018-08-17 NOTE — MR AVS SNAPSHOT
After Visit Summary   8/17/2018    Lashae Caicedo    MRN: [id number]           Patient Information     Date Of Birth          1975        Visit Information        Provider Department      8/17/2018 4:00 PM Melyssa Lewis MD [facility] [place]        Today's Diagnoses     Acute right-sided low back pain with right-sided sciatica    -  1    Fall, subsequent encounter           Follow-ups after your visit        Your next 10 appointments already scheduled     Aug 24, 2018 11:00 AM CDT   (Arrive by 10:45 AM)   US PELVIC COMPLETE W TRANSVAGINAL with HIUS1   HI ULTRASOUND ([facility] )    [address] [place] [id number]   [phone]           Please bring a list of your medicines (including vitamins, minerals and over-the-counter drugs). Also, tell your doctor about any allergies you may have. Wear comfortable clothes and leave your valuables at home.  Adults: Drink four 8-ounce glasses of fluid an hour before your exam. If you need to empty your bladder before your exam, try to release only a little urine. Then, drink another glass of fluid.  Children: Children who are potty trained up to 6 years old should drink at least 2 cups (16 oz) of water/non-carbonated beverage 30 minutes prior to the exam. Children who are 6-10 years should drink at least 3 cups (24 oz) of water/non-carbonated beverage 45 minutes prior to the exam. Children who are 10 years or older should drink at least 4 cups (32 oz) of water/non-carbonated beverage 45 minutes prior to the exam. If your child is very uncomfortable or has an urgent need to pee, please notify a technologist; they will try to find out how much longer the wait may be and provide instructions to help relieve the pressure.  Please call the Imaging Department at your exam site with any questions.            Sep 21, 2018  4:00 PM CDT   (Arrive by 3:45 PM)   SHORT with Melyssa L Kapella, MD   [facility]  [facility] )    [address] [place] [address]   [phone]              Who to contact     If you have questions or need follow up information about today's clinic visit or your schedule please contact [facility] directly at [phone].  Normal or non-critical lab and imaging results will be communicated to you by MyChart, letter or phone within 4 business days after the clinic has received the results. If you do not hear from us within 7 days, please contact the clinic through MyChart or phone. If you have a critical or abnormal lab result, we will notify you by phone as soon as possible.  Submit refill requests through [employer] or call your pharmacy and they will forward the refill request to us. Please allow 3 business days for your refill to be completed.          Additional Information About Your Visit        Care EveryWhere ID     This is your Care EveryWhere ID. This could be used by other organizations to access your [facility] medical records  [id number]        Your Vitals Were     Pulse Temperature Pulse Oximetry BMI (Body Mass Index)          80 98.6  F (37  C) (Tympanic) 96% 39.45 kg/m2         Blood Pressure from Last 3 Encounters:   08/17/18 112/82   08/10/18 124/74   07/31/18 144/100    Weight from Last 3 Encounters:   08/17/18 229 lb 12.8 oz (104.2 kg)   08/10/18 225 lb (102.1 kg)   07/10/18 220 lb (99.8 kg)              Today, you had the following     No orders found for display         Today's Medication Changes          These changes are accurate as of 8/17/18 11:59 PM.  If you have any questions, ask your nurse or doctor.               Start taking these medicines.        Dose/Directions    methylPREDNISolone 4 MG tablet   Commonly known as:  MEDROL DOSEPAK   Used for:  Acute right-sided low back pain with right-sided sciatica   Started by:  Melyssa Lewis MD        Follow package instructions   Quantity:  21 tablet   Refills:  0       naproxen 500 MG tablet    Commonly known as:  NAPROSYN   Used for:  Acute right-sided low back pain with right-sided sciatica   Started by:  Melyssa Lewis MD        Dose:  500 mg   Take 1 tablet (500 mg) by mouth 2 times daily as needed for moderate pain   Quantity:  60 tablet   Refills:  1         Stop taking these medicines if you haven't already. Please contact your care team if you have questions.     ketorolac 10 MG tablet   Commonly known as:  TORADOL   Stopped by:  Melyssa Lewis MD                Where to get your medicines      These medications were sent to [id number] - [place], [place] - [address]   [address] , [place] [place] [address]     Phone:  [phone]     methylPREDNISolone 4 MG tablet    naproxen 500 MG tablet                Primary Care Provider Office Phone # Fax #    Melyssa Lewis [phone] [address] [facility] [address]        Equal Access to Services     [facility] AH: Hadii aad jason hadasho Soomaali, waaxda luqadaha, qaybta kaalmada adeegyada, waxay idiin hayaan vida marlow . So [facility] [phone].    ATENCIÓN: Si habla español, tiene a crystal disposición servicios gratuitos de asistencia lingüística. [facility] [phone].    We comply with applicable federal civil rights laws and [place] laws. We do not discriminate on the basis of race, color, national origin, age, disability, sex, sexual orientation, or gender identity.            Thank you!     Thank you for choosing [facility]  for your care. Our goal is always to provide you with excellent care. Hearing back from our patients is one way we can continue to improve our services. Please take a few minutes to complete the written survey that you may receive in the mail after your visit with us. Thank you!             Your Updated Medication List - Protect others around you: Learn how to safely use, store and throw away your medicines at www.disposemymeds.org.          This list is accurate as  of 8/17/18 11:59 PM.  Always use your most recent med list.                   Brand Name Dispense Instructions for use Diagnosis    albuterol 108 (90 Base) MCG/ACT inhaler    PROAIR HFA/PROVENTIL HFA/VENTOLIN HFA    1 Inhaler    Inhale 2 puffs into the lungs every 6 hours as needed for shortness of breath / dyspnea or wheezing    Mild intermittent asthma without complication       cephalexin 250 MG capsule    KEFLEX    28 capsule    Take 1 capsule (250 mg) by mouth 4 times daily for 7 days    Folliculitis       EPINEPHrine 0.3 MG/0.3ML injection 2-pack    EPIPEN/ADRENACLICK/or ANY BX GENERIC EQUIV    0.6 mL    Inject 0.3 mLs (0.3 mg) into the muscle as needed for anaphylaxis    Hx of antibiotic allergy       methylPREDNISolone 4 MG tablet    MEDROL DOSEPAK    21 tablet    Follow package instructions    Acute right-sided low back pain with right-sided sciatica       naproxen 500 MG tablet    NAPROSYN    60 tablet    Take 1 tablet (500 mg) by mouth 2 times daily as needed for moderate pain    Acute right-sided low back pain with right-sided sciatica       nystatin 210944 UNIT/GM Powd    MYCOSTATIN    30 g    Apply topically 3 times daily as needed    Candidal intertrigo       pantoprazole 20 MG EC tablet    PROTONIX    60 tablet    Take 1 tablet (20 mg) by mouth daily Take by mouth 30-60 minutes before a meal.    Abdominal pain, epigastric       propranolol 20 MG tablet    INDERAL    180 tablet    Take 1 tablet (20 mg) by mouth 2 times daily    Chronic migraine without aura without status migrainosus, not intractable

## 2018-08-17 NOTE — PROGRESS NOTES
Occupational Visit     Subjective:  Lashae Caicedo, 42 year old, female is seen 8/17/18.  The date of injury is 7/30/18.  The patient is employed at [employer].     Pt states she fell onto her right hip after a roller chair slid out from under her. Was seen in the ED on 7/31 and imaging was done and negative. Continues to have pain and stiffness. Also notes intermittent numbness/tingling down the right leg that occurs she if stands or sits in the same position too long. Numbness is in the whole right foot. Pain is generally improving. No new or changing symptoms. Was taking toradol which was given to her in the ED for pain. This did help. Is out of this now.       Allergies   Allergen Reactions     Latex Hives     Penicillins      Sulfa Drugs      Aspirin Rash     Sumatriptan Rash         Review of Systems:  Constitutional, HEENT, cardiovascular, pulmonary, gi and gu systems are negative, except as otherwise noted.      OBJECTIVE:  Vitals: B/P: 112/82, T: 98.6, P: 80, R: Data Unavailable      Exam:  GENERAL: healthy, alert, well nourished, well hydrated, no distress  RESP: lungs clear to auscultation - no rales, no rhonchi, no wheezes  CV: regular rates and rhythm, normal S1 S2, no S3 or S4 and no murmur, no click or rub -  Hip Exam: Palpation: Tender:   right proximal hamstring attachment  Non-tender:  right greater trochanter  Range of Motion:  Full ROM, both hips  Strength:  full strength  Special tests:  LORY negative, FADER negative    Lower Leg Exam: Inspection; no swelling, no ecchymosis, no deformity  Palpation: Non tender  Strength: gastrocsoleus: 5/5, anterior tibialis:  5/5, peroneals: 5/5    Foot Exam: Inspection:  no swelling  Tender::none  Sensation:Decreased sensation subjectively over entire foot    Lumber/Thoracic Spine Exam: Tender:  right para lumbar muscles, right SI joint  Non-tender:  lumbar facet joints  Strength:  able to heel walk, able to toe walk  Special tests:  negative  straight leg raises    Hip Exam: Hip ROM full        Labs: None      ASSESSMENT/PLAN:    Lashae was seen today for work comp.    Diagnoses and all orders for this visit:    Acute right-sided low back pain with right-sided sciatica / Fall, subsequent encounter  Hip imaging negative in ED. Pain with radicular sx (however, do not follow clear dermatome) that is intermittent since. Workability filled out, see scan. Naproxen bid. Trial of steroid. Follow up 1 month.   -     methylPREDNISolone (MEDROL DOSEPAK) 4 MG tablet; Follow package instructions  -     naproxen (NAPROSYN) 500 MG tablet; Take 1 tablet (500 mg) by mouth 2 times daily as needed for moderate pain      Melyssa Lewis MD

## 2018-08-20 ENCOUNTER — TELEPHONE (OUTPATIENT)
Dept: FAMILY MEDICINE | Facility: OTHER | Age: 43
End: 2018-08-20

## 2018-08-20 NOTE — TELEPHONE ENCOUNTER
Heat, tylenol and ibuprofen may be helpful. Steroids should be tapering, so hopefully that will will help as well. If pain worsens, please be seen.

## 2018-08-20 NOTE — TELEPHONE ENCOUNTER
Pain seems worse across abdomen since starting on steroids, has ultrasound set for this week.  Has been using heat and that seems to help a little, any other suggestions?

## 2018-08-20 NOTE — TELEPHONE ENCOUNTER
To: Nurse covering for Jezella  Patient is requesting a return phone call (today 8/20/18) to discuss her pelvic ultra sound and steroids.  Her phone [phone] Thank you

## 2018-08-24 ENCOUNTER — HOSPITAL ENCOUNTER (OUTPATIENT)
Dept: ULTRASOUND IMAGING | Facility: HOSPITAL | Age: 43
Discharge: HOME | End: 2018-08-24
Attending: FAMILY MEDICINE | Admitting: FAMILY MEDICINE
Payer: COMMERCIAL

## 2018-08-24 DIAGNOSIS — Z98.890 S/P ENDOMETRIAL ABLATION: Primary | ICD-10-CM

## 2018-08-24 DIAGNOSIS — N89.8 VAGINAL DISCHARGE: ICD-10-CM

## 2018-08-24 PROCEDURE — 76856 US EXAM PELVIC COMPLETE: CPT | Mod: TC

## 2018-08-31 ENCOUNTER — OFFICE VISIT (OUTPATIENT)
Dept: OBGYN | Facility: OTHER | Age: 43
End: 2018-08-31
Attending: FAMILY MEDICINE
Payer: COMMERCIAL

## 2018-08-31 VITALS
HEART RATE: 94 BPM | DIASTOLIC BLOOD PRESSURE: 78 MMHG | BODY MASS INDEX: 39.27 KG/M2 | HEIGHT: 64 IN | WEIGHT: 230 LBS | OXYGEN SATURATION: 96 % | SYSTOLIC BLOOD PRESSURE: 118 MMHG

## 2018-08-31 DIAGNOSIS — K58.2 IRRITABLE BOWEL SYNDROME WITH BOTH CONSTIPATION AND DIARRHEA: ICD-10-CM

## 2018-08-31 DIAGNOSIS — N94.10 DYSPAREUNIA, FEMALE: ICD-10-CM

## 2018-08-31 DIAGNOSIS — R10.2 PELVIC PAIN IN FEMALE: Primary | ICD-10-CM

## 2018-08-31 DIAGNOSIS — Z98.890 S/P ENDOMETRIAL ABLATION: ICD-10-CM

## 2018-08-31 DIAGNOSIS — R10.13 ABDOMINAL PAIN, EPIGASTRIC: ICD-10-CM

## 2018-08-31 DIAGNOSIS — Z12.4 SCREENING FOR CERVICAL CANCER: ICD-10-CM

## 2018-08-31 PROBLEM — R35.0 URINARY FREQUENCY: Status: ACTIVE | Noted: 2018-08-31

## 2018-08-31 PROBLEM — R10.84 ABDOMINAL PAIN, GENERALIZED: Status: RESOLVED | Noted: 2018-08-31 | Resolved: 2018-08-31

## 2018-08-31 PROBLEM — R10.84 ABDOMINAL PAIN, GENERALIZED: Status: ACTIVE | Noted: 2018-08-31

## 2018-08-31 PROBLEM — N89.8 VAGINAL DISCHARGE: Status: ACTIVE | Noted: 2018-08-31

## 2018-08-31 LAB
ALBUMIN UR-MCNC: NEGATIVE MG/DL
AMORPH CRY #/AREA URNS HPF: ABNORMAL /HPF
APPEARANCE UR: ABNORMAL
BACTERIA #/AREA URNS HPF: ABNORMAL /HPF
BILIRUB UR QL STRIP: NEGATIVE
C TRACH DNA SPEC QL PROBE+SIG AMP: NOT DETECTED
COLOR UR AUTO: YELLOW
GLUCOSE UR STRIP-MCNC: NEGATIVE MG/DL
HGB UR QL STRIP: NEGATIVE
KETONES UR STRIP-MCNC: NEGATIVE MG/DL
LEUKOCYTE ESTERASE UR QL STRIP: NEGATIVE
MUCOUS THREADS #/AREA URNS LPF: PRESENT /LPF
N GONORRHOEA DNA SPEC QL PROBE+SIG AMP: NOT DETECTED
NITRATE UR QL: NEGATIVE
PH UR STRIP: 7 PH (ref 4.7–8)
RBC #/AREA URNS AUTO: 0 /HPF (ref 0–2)
SOURCE: ABNORMAL
SP GR UR STRIP: 1.01 (ref 1–1.03)
SPECIMEN SOURCE: NORMAL
SQUAMOUS #/AREA URNS AUTO: 6 /HPF (ref 0–1)
T3FREE SERPL-MCNC: 2.8 PG/ML (ref 2.3–4.2)
T4 FREE SERPL-MCNC: 1.07 NG/DL (ref 0.76–1.46)
TSH SERPL DL<=0.005 MIU/L-ACNC: 1.22 MU/L (ref 0.4–4)
UROBILINOGEN UR STRIP-MCNC: NORMAL MG/DL (ref 0–2)
WBC #/AREA URNS AUTO: 0 /HPF (ref 0–5)

## 2018-08-31 PROCEDURE — G0123 SCREEN CERV/VAG THIN LAYER: HCPCS | Mod: ZL | Performed by: OBSTETRICS & GYNECOLOGY

## 2018-08-31 PROCEDURE — G0476 HPV COMBO ASSAY CA SCREEN: HCPCS | Mod: ZL | Performed by: OBSTETRICS & GYNECOLOGY

## 2018-08-31 PROCEDURE — G0463 HOSPITAL OUTPT CLINIC VISIT: HCPCS

## 2018-08-31 PROCEDURE — 36415 COLL VENOUS BLD VENIPUNCTURE: CPT | Mod: ZL | Performed by: OBSTETRICS & GYNECOLOGY

## 2018-08-31 PROCEDURE — 84439 ASSAY OF FREE THYROXINE: CPT | Mod: ZL | Performed by: OBSTETRICS & GYNECOLOGY

## 2018-08-31 PROCEDURE — 84481 FREE ASSAY (FT-3): CPT | Mod: ZL | Performed by: OBSTETRICS & GYNECOLOGY

## 2018-08-31 PROCEDURE — 87591 N.GONORRHOEAE DNA AMP PROB: CPT | Mod: ZL | Performed by: OBSTETRICS & GYNECOLOGY

## 2018-08-31 PROCEDURE — G0463 HOSPITAL OUTPT CLINIC VISIT: HCPCS | Mod: 25

## 2018-08-31 PROCEDURE — 99214 OFFICE O/P EST MOD 30 MIN: CPT | Performed by: OBSTETRICS & GYNECOLOGY

## 2018-08-31 PROCEDURE — 81001 URINALYSIS AUTO W/SCOPE: CPT | Mod: ZL | Performed by: OBSTETRICS & GYNECOLOGY

## 2018-08-31 PROCEDURE — 87491 CHLMYD TRACH DNA AMP PROBE: CPT | Mod: ZL | Performed by: OBSTETRICS & GYNECOLOGY

## 2018-08-31 PROCEDURE — 84443 ASSAY THYROID STIM HORMONE: CPT | Mod: ZL | Performed by: OBSTETRICS & GYNECOLOGY

## 2018-08-31 PROCEDURE — 87624 HPV HI-RISK TYP POOLED RSLT: CPT | Mod: ZL | Performed by: OBSTETRICS & GYNECOLOGY

## 2018-08-31 PROCEDURE — 99000 SPECIMEN HANDLING OFFICE-LAB: CPT | Performed by: OBSTETRICS & GYNECOLOGY

## 2018-08-31 RX ORDER — CIPROFLOXACIN 500 MG/1
500 TABLET, FILM COATED ORAL 2 TIMES DAILY
Qty: 14 TABLET | Refills: 0 | Status: SHIPPED | OUTPATIENT
Start: 2018-08-31 | End: 2018-12-11

## 2018-08-31 RX ORDER — DICYCLOMINE HYDROCHLORIDE 10 MG/1
10 CAPSULE ORAL
Qty: 240 CAPSULE | Refills: 11 | Status: SHIPPED | OUTPATIENT
Start: 2018-08-31 | End: 2018-12-11

## 2018-08-31 ASSESSMENT — PAIN SCALES - GENERAL: PAINLEVEL: MODERATE PAIN (5)

## 2018-08-31 NOTE — NURSING NOTE
"Chief Complaint   Patient presents with     Consult     s/p endometrial ablation       Initial /78 (BP Location: Left arm, Patient Position: Sitting, Cuff Size: Adult Large)  Pulse 94  Ht 5' 4\" (1.626 m)  Wt 230 lb (104.3 kg)  SpO2 96%  BMI 39.48 kg/m2 Estimated body mass index is 39.48 kg/(m^2) as calculated from the following:    Height as of this encounter: 5' 4\" (1.626 m).    Weight as of this encounter: 230 lb (104.3 kg).  Medication Reconciliation: complete    Gricelda Merchant LPN  "

## 2018-08-31 NOTE — LETTER
Lashae,                     Your pap smear and HPV are both normal. If you have any questions please call [phone].                        Carlos Youssef MD/ Josseline MILLER

## 2018-08-31 NOTE — PATIENT INSTRUCTIONS
Diets discussed  IBS-reflux-colon spasm-UTI-pelvic pain relationships explained.    Medication rational discussed.

## 2018-08-31 NOTE — MR AVS SNAPSHOT
After Visit Summary   8/31/2018    Lashae Caicedo    MRN: [id number]           Patient Information     Date Of Birth          1975        Visit Information        Provider Department      8/31/2018 10:30 AM Frow, [facility]        Today's Diagnoses     Pelvic pain in female-intermittent x 2 months--9/2018    -  1    S/P endometrial ablation        Dyspareunia, female-after--9/2018        Abdominal pain, epigastric-REFLUX-Protonix-8/2018        Irritable bowel syndrome with both constipation and diarrhea-LACTOSE iintolerant--8/2018        Screening for cervical cancer          Care Instructions    Diets discussed  IBS-reflux-colon spasm-UTI-pelvic pain relationships explained.    Medication rational discussed.          Follow-ups after your visit        Follow-up notes from your care team     Return in about 4 weeks (around 9/28/2018) for fu.      Your next 10 appointments already scheduled     Sep 21, 2018  4:00 PM CDT   (Arrive by 3:45 PM)   SHORT with Melyssa Lewis MD   [facility] ([facility]3] [place] ([facility] )    [address]  [facility] [address]   [phone]            Oct 04, 2018  4:00 PM CDT   (Arrive by 3:45 PM)   Office Visit with David Franke Frow, MD   [facility] ([facility]3] [place] ([facility] )    [address]  [facility] [address]   [phone]           Bring a current list of meds and any records pertaining to this visit. For Physicals, please bring immunization records and any forms needing to be filled out. Please arrive 10 minutes early to complete paperwork.              Who to contact     If you have questions or need follow up information about today's clinic visit or your schedule please contact [facility] directly at [phone].  Normal or non-critical lab and imaging results will be communicated to you by MyChart, letter or phone within 4 business days after the clinic has received  "the results. If you do not hear from us within 7 days, please contact the clinic through [employer] or phone. If you have a critical or abnormal lab result, we will notify you by phone as soon as possible.  Submit refill requests through [employer] or call your pharmacy and they will forward the refill request to us. Please allow 3 business days for your refill to be completed.          Additional Information About Your Visit        Care EveryWhere ID     This is your Care EveryWhere ID. This could be used by other organizations to access your [facility] medical records  [id number]        Your Vitals Were     Pulse Height Pulse Oximetry BMI (Body Mass Index)          94 5' 4\" (1.626 m) 96% 39.48 kg/m2         Blood Pressure from Last 3 Encounters:   08/31/18 118/78   08/17/18 112/82   08/10/18 124/74    Weight from Last 3 Encounters:   08/31/18 230 lb (104.3 kg)   08/17/18 229 lb 12.8 oz (104.2 kg)   08/10/18 225 lb (102.1 kg)              We Performed the Following     A pap thin layer screen with  HPV - recommended age 30 - 65 years (select HPV order below)     GC/Chlamydia by PCR - [facility]     HPV High Risk Types DNA Cervical     T3, Free     T4 free     TSH     UA with Microscopic reflex to Culture          Today's Medication Changes          These changes are accurate as of 8/31/18 11:12 AM.  If you have any questions, ask your nurse or doctor.               Start taking these medicines.        Dose/Directions    ciprofloxacin 500 MG tablet   Commonly known as:  CIPRO   Used for:  Pelvic pain in female   Started by:  Frow, David Franke, MD        Dose:  500 mg   Take 1 tablet (500 mg) by mouth 2 times daily   Quantity:  14 tablet   Refills:  0       dicyclomine 10 MG capsule   Commonly known as:  BENTYL   Used for:  Irritable bowel syndrome with both constipation and diarrhea   Started by:  Frow, David Franke, MD        Dose:  10 mg   Take 1 capsule (10 mg) by mouth 4 times daily (with meals and nightly)   Quantity:  " 240 capsule   Refills:  11       ranitidine 300 MG tablet   Commonly known as:  ZANTAC   Used for:  Irritable bowel syndrome with both constipation and diarrhea   Started by:  Frow, David Franke, MD        Dose:  300 mg   Take 1 tablet (300 mg) by mouth At Bedtime   Quantity:  30 tablet   Refills:  11            Where to get your medicines      These medications were sent to [id number] - [place], [place] - [address]   [id number] [facility] [place] [address]     Phone:  [phone]     ciprofloxacin 500 MG tablet    dicyclomine 10 MG capsule    ranitidine 300 MG tablet                Primary Care Provider Office Phone # Fax #    Melyssa Lewis [phone]       [address] [facility] [address]        Equal Access to Services     [facility] : Lillian gomez hadasho Soomaali, waaxda luqadaha, qaybta kaalmada adeegyada, brissa marlow . So [facility] [phone].    ATENCIÓN: Si habla español, tiene a crystal disposición servicios gratuitos de asistencia lingüística. [facility] [phone].    We comply with applicable federal civil rights laws and [place] laws. We do not discriminate on the basis of race, color, national origin, age, disability, sex, sexual orientation, or gender identity.            Thank you!     Thank you for choosing [facility]  for your care. Our goal is always to provide you with excellent care. Hearing back from our patients is one way we can continue to improve our services. Please take a few minutes to complete the written survey that you may receive in the mail after your visit with us. Thank you!             Your Updated Medication List - Protect others around you: Learn how to safely use, store and throw away your medicines at www.disposemymeds.org.          This list is accurate as of 8/31/18 11:12 AM.  Always use your most recent med list.                   Brand Name Dispense Instructions for use Diagnosis    albuterol 108 (90  Base) MCG/ACT inhaler    PROAIR HFA/PROVENTIL HFA/VENTOLIN HFA    1 Inhaler    Inhale 2 puffs into the lungs every 6 hours as needed for shortness of breath / dyspnea or wheezing    Mild intermittent asthma without complication       ciprofloxacin 500 MG tablet    CIPRO    14 tablet    Take 1 tablet (500 mg) by mouth 2 times daily    Pelvic pain in female       dicyclomine 10 MG capsule    BENTYL    240 capsule    Take 1 capsule (10 mg) by mouth 4 times daily (with meals and nightly)    Irritable bowel syndrome with both constipation and diarrhea       EPINEPHrine 0.3 MG/0.3ML injection 2-pack    EPIPEN/ADRENACLICK/or ANY BX GENERIC EQUIV    0.6 mL    Inject 0.3 mLs (0.3 mg) into the muscle as needed for anaphylaxis    Hx of antibiotic allergy       methylPREDNISolone 4 MG tablet    MEDROL DOSEPAK    21 tablet    Follow package instructions    Acute right-sided low back pain with right-sided sciatica       naproxen 500 MG tablet    NAPROSYN    60 tablet    Take 1 tablet (500 mg) by mouth 2 times daily as needed for moderate pain    Acute right-sided low back pain with right-sided sciatica       pantoprazole 20 MG EC tablet    PROTONIX    60 tablet    Take 1 tablet (20 mg) by mouth daily Take by mouth 30-60 minutes before a meal.    Abdominal pain, epigastric       propranolol 20 MG tablet    INDERAL    180 tablet    Take 1 tablet (20 mg) by mouth 2 times daily    Chronic migraine without aura without status migrainosus, not intractable       ranitidine 300 MG tablet    ZANTAC    30 tablet    Take 1 tablet (300 mg) by mouth At Bedtime    Irritable bowel syndrome with both constipation and diarrhea

## 2018-08-31 NOTE — PROGRESS NOTES
chief complaint:    Discharge, frequency, pelvic pain--bxguh-qdaorcdsv-afdvxaghhouw, reflux, dyspareunia end of sex    History of Present Illness: This 42 year old female is seen at the request of Joshua for evaluation and suggestions of management of:  See CC       has a past medical history of Abnormal uterine bleeding (AUB).   has a past surgical history that includes tubal ligation (2011) and ABLATION, ENDOMETRIAL, THERMAL, W/O HYSTEROSCOPIC GUIDANCE.  Ms. Caicedo had no medications administered during this visit.     ROS: 10 point ROS neg other than the symptoms noted above in the HPI.    Exam:  Constitutional: healthy, alert and no distress  Gastrointestinal: Abdomen soft, TENDER bladder--ascending--descending colon. BS normal. No masses, organomegaly  : Normal external genitalia without lesions         Vault clear         CX normal--TP done,cultures and HPV         Adenexae normal with colon tenderness and               bladder tenderness  Psychiatric: mentation appears normal and affect normal/bright      Impression:   Reflux, IBS, frequency, pelvic pain, dyspareunia      Recommendations:  IBS diet, UTI-frequency diet, reflux diet, Cipro, Dicyclomine, add Zantac 300 at bedtime to Protonix AM regimen.        Carlos Youssef MD

## 2018-09-07 LAB
COPATH REPORT: NORMAL
PAP: NORMAL

## 2018-09-10 LAB
FINAL DIAGNOSIS: NORMAL
HPV HR 12 DNA CVX QL NAA+PROBE: NEGATIVE
HPV16 DNA SPEC QL NAA+PROBE: NEGATIVE
HPV18 DNA SPEC QL NAA+PROBE: NEGATIVE
SPECIMEN DESCRIPTION: NORMAL
SPECIMEN SOURCE CVX/VAG CYTO: NORMAL

## 2018-09-19 ENCOUNTER — TELEPHONE (OUTPATIENT)
Dept: FAMILY MEDICINE | Facility: OTHER | Age: 43
End: 2018-09-19

## 2018-09-19 ENCOUNTER — HOSPITAL ENCOUNTER (EMERGENCY)
Facility: HOSPITAL | Age: 43
Discharge: HOME | End: 2018-09-19
Attending: NURSE PRACTITIONER | Admitting: NURSE PRACTITIONER
Payer: COMMERCIAL

## 2018-09-19 VITALS
BODY MASS INDEX: 37.93 KG/M2 | WEIGHT: 221 LBS | RESPIRATION RATE: 16 BRPM | OXYGEN SATURATION: 97 % | HEART RATE: 93 BPM | DIASTOLIC BLOOD PRESSURE: 86 MMHG | TEMPERATURE: 99.3 F | SYSTOLIC BLOOD PRESSURE: 129 MMHG

## 2018-09-19 DIAGNOSIS — J01.00 SUBACUTE MAXILLARY SINUSITIS: ICD-10-CM

## 2018-09-19 PROCEDURE — G0463 HOSPITAL OUTPT CLINIC VISIT: HCPCS

## 2018-09-19 PROCEDURE — 99213 OFFICE O/P EST LOW 20 MIN: CPT | Performed by: NURSE PRACTITIONER

## 2018-09-19 RX ORDER — AZITHROMYCIN 250 MG/1
TABLET, FILM COATED ORAL
Qty: 6 TABLET | Refills: 0 | Status: SHIPPED | OUTPATIENT
Start: 2018-09-19 | End: 2018-09-24

## 2018-09-19 ASSESSMENT — ENCOUNTER SYMPTOMS
SINUS PRESSURE: 1
MYALGIAS: 1
VOMITING: 0
SORE THROAT: 1
FEVER: 1
COUGH: 1
NAUSEA: 0
SHORTNESS OF BREATH: 0
DIFFICULTY URINATING: 0
DIARRHEA: 0
RHINORRHEA: 1
DIZZINESS: 0
FATIGUE: 1
ABDOMINAL PAIN: 0
HEADACHES: 0
SINUS PAIN: 1
ARTHRALGIAS: 1
PALPITATIONS: 0
PSYCHIATRIC NEGATIVE: 1

## 2018-09-19 NOTE — ED TRIAGE NOTES
Pt presents today with c/o stuffed nose, cough (productive), ribs hurt from cough. Started 3 days ago. Rates pain at 5.

## 2018-09-19 NOTE — ED PROVIDER NOTES
"  History     Chief Complaint   Patient presents with     Cough     \"since Monday evening\"      HPI  Lashae Caicedo is a 43 year old female who Developed runny nose, postnasal drip,  And cough with thick   green sputum on Monday  Fever 101 TMAX yesterday.Can't stop coughing.    No earache, sore throat from coughing.   No nausea ,vomiting , diarrhea.   Patient smokes, though less when has been sick.     Problem List:    Patient Active Problem List    Diagnosis Date Noted     Abdominal pain, epigastric-REFLUX-Protonix-8/2018 08/31/2018     Priority: Medium     Vaginal discharge with odor--9/2018 08/31/2018     Priority: Medium     Irritable bowel syndrome with both constipation and diarrhea-LACTOSE iintolerant--8/2018 08/31/2018     Priority: Medium     Pelvic pain in female-intermittent x 2 months--9/2018 08/31/2018     Priority: Medium     Urinary frequency--UTI--9/2018 08/31/2018     Priority: Medium     Dyspareunia, female-after--9/2018 08/31/2018     Priority: Medium     Depression, recurrent (H) 10/25/2017     Priority: Medium     Abnormal uterine bleeding--S/P ENDOMETRIAL ABLATION, no  bleeding since--10/2017 09/21/2017     Priority: Medium     Migraine syndrome 09/12/2017     Priority: Medium     Hx of antibiotic allergy    PCN,  SULFA 03/20/2017     Priority: Medium     Obesity, Class II, BMI 35-39.9 03/20/2017     Priority: Medium     TMJ (temporomandibular joint syndrome) 03/20/2017     Priority: Medium        Past Medical History:    Past Medical History:   Diagnosis Date     Abnormal uterine bleeding (AUB)        Past Surgical History:    Past Surgical History:   Procedure Laterality Date     HC ABLATION, ENDOMETRIAL, THERMAL, W/O HYSTEROSCOPIC GUIDANCE       TUBAL LIGATION  2011       Family History:    Family History   Problem Relation Age of Onset     Other - See Comments Mother      endometriosis       Social History:  Marital Status:  Single [1]  Social History   Substance Use Topics     Smoking " status: Current Every Day Smoker     Packs/day: 1.00     Types: Cigarettes     Smokeless tobacco: Former User     Alcohol use Yes      Comment: wine coolers on occasion        Medications:      azithromycin (ZITHROMAX Z-CECILE) 250 MG tablet   albuterol (PROAIR HFA/PROVENTIL HFA/VENTOLIN HFA) 108 (90 Base) MCG/ACT inhaler   ciprofloxacin (CIPRO) 500 MG tablet   dicyclomine (BENTYL) 10 MG capsule   EPINEPHrine (EPIPEN/ADRENACLICK/OR ANY BX GENERIC EQUIV) 0.3 MG/0.3ML injection 2-pack   methylPREDNISolone (MEDROL DOSEPAK) 4 MG tablet   naproxen (NAPROSYN) 500 MG tablet   pantoprazole (PROTONIX) 20 MG EC tablet   propranolol (INDERAL) 20 MG tablet   ranitidine (ZANTAC) 300 MG tablet         Review of Systems   Constitutional: Positive for fatigue and fever.   HENT: Positive for congestion, postnasal drip, rhinorrhea, sinus pain, sinus pressure and sore throat. Negative for ear pain.    Respiratory: Positive for cough. Negative for shortness of breath.    Cardiovascular: Negative for chest pain and palpitations.   Gastrointestinal: Negative for abdominal pain, diarrhea, nausea and vomiting.   Genitourinary: Negative for difficulty urinating.   Musculoskeletal: Positive for arthralgias and myalgias.   Skin: Negative for rash.   Neurological: Negative for dizziness and headaches.   Psychiatric/Behavioral: Negative.        Physical Exam   BP: 129/86  Pulse: 93  Temp: 99.3  F (37.4  C)  Resp: 16  Weight: 100.2 kg (221 lb)  SpO2: 97 %      Physical Exam   Constitutional: She is oriented to person, place, and time.   HENT:   Right Ear: External ear normal.   Left Ear: External ear normal.   Mouth/Throat: Oropharynx is clear and moist.   Nares red , swollen   Tender at Maxillary siuses to palpation.     Eyes: Conjunctivae and EOM are normal. Pupils are equal, round, and reactive to light.   Neck: Normal range of motion. Neck supple. No thyromegaly present.   Cardiovascular: Normal rate, regular rhythm and normal heart sounds.     No murmur heard.  Pulmonary/Chest: Effort normal. She has wheezes.   Harsh cough   Musculoskeletal: Normal range of motion.   Lymphadenopathy:     She has no cervical adenopathy.   Neurological: She is alert and oriented to person, place, and time. No cranial nerve deficit.   Skin: Skin is warm and dry.   Psychiatric: She has a normal mood and affect.       ED Course     ED Course     Procedures                   No results found for this or any previous visit (from the past 24 hour(s)).    Medications - No data to display    Assessments & Plan (with Medical Decision Making)     I have reviewed the nursing notes.    I have reviewed the findings, diagnosis, plan and need for follow up with the patient.      Discharge Medication List as of 9/19/2018 12:18 PM      START taking these medications    Details   azithromycin (ZITHROMAX Z-CECILE) 250 MG tablet Two tablets on the first day, then one tablet daily for the next 4 days, Disp-6 tablet, R-0, E-Prescribe             Final diagnoses:   Subacute maxillary sinusitis     ASSESSMENT / PLAN:  (J01.00) Subacute maxillary sinusitis  Comment:   Plan:   1. Zpak 500mg today, 250mg a day for 4 days.    2. Mucinex 600mg  2 times a day for  Cough, nasal congestion  3. Drink  Lots of water. !       9/19/2018   [facility]     Dennis Zapata NP  09/19/18 [id number]

## 2018-09-19 NOTE — ED AVS SNAPSHOT
[facility]    [address] [address]    Phone:  [phone]                                       Lashae Caicedo   MRN: [id number]    Department:  [facility]   Date of Visit:  9/19/2018           After Visit Summary Signature Page     I have received my discharge instructions, and my questions have been answered. I have discussed any challenges I see with this plan with the nurse or doctor.    ..........................................................................................................................................  Patient/Patient Representative Signature      ..........................................................................................................................................  Patient Representative Print Name and Relationship to Patient    ..................................................               ................................................  Date                                   Time    ..........................................................................................................................................  Reviewed by Signature/Title    ...................................................              ..............................................  Date                                               Time          22EPIC Rev 08/18

## 2018-09-19 NOTE — TELEPHONE ENCOUNTER
8:39 AM    Reason for Call: OVERBOOK    Patient is having the following symptoms: flu like symptoms for 3 days.    The patient is requesting an appointment for today with Dr. Lewis.    Was an appointment offered for this call? No  If yes : Appointment type              Date    Preferred method for responding to this message: Telephone Call  What is your phone number ?[phone]    If we cannot reach you directly, may we leave a detailed response at the number you provided? Yes    Can this message wait until your PCP/provider returns, if unavailable today? Not applicable, provider is in    Ally Lee

## 2018-09-19 NOTE — ED AVS SNAPSHOT
[facility]    [address] [address]    Phone:  [phone]                                       Lashae Caicedo   MRN: [id number]    Department:  [facility]   Date of Visit:  9/19/2018           Patient Information     Date Of Birth          1975        Your diagnoses for this visit were:     Subacute maxillary sinusitis        You were seen by Dennis Zapata NP.      Follow-up Information     Follow up with Melyssa Lewis MD.    Specialty:  Family Practice    Contact information:    [address] [place] [address]  [phone]          Discharge Instructions       1.  Zpak  500mg today, 250mg a day for 4 days  2. Mucinex 600mg  2 times a day for congestion Cough-Over the counter.    3. Drink lots of water!     Discharge References/Attachments     SINUSITIS (ANTIBIOTIC TREATMENT) (ENGLISH)      Your next 10 appointments already scheduled     Sep 21, 2018  4:00 PM CDT   (Arrive by 3:45 PM)   [facility] with Melyssa Lewis MD   [facility]    [address] [facility] [address]   [phone]            Oct 04, 2018  4:00 PM CDT   (Arrive by 3:45 PM)   Office Visit with David Franke Frow, MD   [facility] ([facility] )    [address] [place] [address]   [phone]           Bring a current list of meds and any records pertaining to this visit. For Physicals, please bring immunization records and any forms needing to be filled out. Please arrive 10 minutes early to complete paperwork.                 Review of your medicines      START taking        Dose / Directions Last dose taken    azithromycin 250 MG tablet   Commonly known as:  ZITHROMAX Z-CECILE   Quantity:  6 tablet        Two tablets on the first day, then one tablet daily for the next 4 days   Refills:  0          Our records show that you are taking the medicines listed below. If these are  incorrect, please call your family doctor or clinic.        Dose / Directions Last dose taken    albuterol 108 (90 Base) MCG/ACT inhaler   Commonly known as:  PROAIR HFA/PROVENTIL HFA/VENTOLIN HFA   Dose:  2 puff   Quantity:  1 Inhaler        Inhale 2 puffs into the lungs every 6 hours as needed for shortness of breath / dyspnea or wheezing   Refills:  3        ciprofloxacin 500 MG tablet   Commonly known as:  CIPRO   Dose:  500 mg   Quantity:  14 tablet        Take 1 tablet (500 mg) by mouth 2 times daily   Refills:  0        dicyclomine 10 MG capsule   Commonly known as:  BENTYL   Dose:  10 mg   Quantity:  240 capsule        Take 1 capsule (10 mg) by mouth 4 times daily (with meals and nightly)   Refills:  11        EPINEPHrine 0.3 MG/0.3ML injection 2-pack   Commonly known as:  EPIPEN/ADRENACLICK/or ANY BX GENERIC EQUIV   Dose:  0.3 mg   Quantity:  0.6 mL        Inject 0.3 mLs (0.3 mg) into the muscle as needed for anaphylaxis   Refills:  0        methylPREDNISolone 4 MG tablet   Commonly known as:  MEDROL DOSEPAK   Quantity:  21 tablet        Follow package instructions   Refills:  0        naproxen 500 MG tablet   Commonly known as:  NAPROSYN   Dose:  500 mg   Quantity:  60 tablet        Take 1 tablet (500 mg) by mouth 2 times daily as needed for moderate pain   Refills:  1        pantoprazole 20 MG EC tablet   Commonly known as:  PROTONIX   Dose:  20 mg   Quantity:  60 tablet        Take 1 tablet (20 mg) by mouth daily Take by mouth 30-60 minutes before a meal.   Refills:  0        propranolol 20 MG tablet   Commonly known as:  INDERAL   Dose:  20 mg   Quantity:  180 tablet        Take 1 tablet (20 mg) by mouth 2 times daily   Refills:  1        ranitidine 300 MG tablet   Commonly known as:  ZANTAC   Dose:  300 mg   Quantity:  30 tablet        Take 1 tablet (300 mg) by mouth At Bedtime   Refills:  11                Prescriptions were sent or printed at these locations (1 Prescription)                   [id number] [place] - [address]    [address] [address]    Telephone:  [phone]   Fax:  [phone]   Hours:                  E-Prescribed (1 of 1)         azithromycin (ZITHROMAX Z-CECILE) 250 MG tablet                Orders Needing Specimen Collection     None      Pending Results     No orders found from 9/17/2018 to 9/20/2018.            Pending Culture Results     No orders found from 9/17/2018 to 9/20/2018.            Thank you for choosing [facility]       Thank you for choosing [facility] for your care. Our goal is always to provide you with excellent care. Hearing back from our patients is one way we can continue to improve our services. Please take a few minutes to complete the written survey that you may receive in the mail after you visit with us. Thank you!        Care EveryWhere ID     This is your Care EveryWhere ID. This could be used by other organizations to access your [facility] medical records  [id number]        Equal Access to Services     [facility] AH: Lillian Mendez, madison lynch, tarah pike, brissa marlow . So [facility] [phone].    ATENCIÓN: Si habla español, tiene a crystal disposición servicios gratuitos de asistencia lingüística. Llame al [phone].    We comply with applicable federal civil rights laws and [place] laws. We do not discriminate on the basis of race, color, national origin, age, disability, sex, sexual orientation, or gender identity.            After Visit Summary       This is your record. Keep this with you and show to your community pharmacist(s) and doctor(s) at your next visit.

## 2018-09-19 NOTE — TELEPHONE ENCOUNTER
Unable to see patient today. Patient does have an upcoming appointment this Friday. Can be seen then, or would recommend urgent care if she cannot wait.

## 2018-09-19 NOTE — DISCHARGE INSTRUCTIONS
1.  Zpak  500mg today, 250mg a day for 4 days  2. Mucinex 600mg  2 times a day for congestion Cough-Over the counter.    3. Drink lots of water!

## 2018-09-24 ENCOUNTER — APPOINTMENT (OUTPATIENT)
Dept: GENERAL RADIOLOGY | Facility: HOSPITAL | Age: 43
End: 2018-09-24
Attending: NURSE PRACTITIONER
Payer: COMMERCIAL

## 2018-09-24 ENCOUNTER — HOSPITAL ENCOUNTER (EMERGENCY)
Facility: HOSPITAL | Age: 43
Discharge: HOME | End: 2018-09-24
Attending: NURSE PRACTITIONER | Admitting: NURSE PRACTITIONER
Payer: COMMERCIAL

## 2018-09-24 ENCOUNTER — TELEPHONE (OUTPATIENT)
Dept: FAMILY MEDICINE | Facility: OTHER | Age: 43
End: 2018-09-24

## 2018-09-24 VITALS
DIASTOLIC BLOOD PRESSURE: 93 MMHG | SYSTOLIC BLOOD PRESSURE: 131 MMHG | HEART RATE: 87 BPM | RESPIRATION RATE: 16 BRPM | OXYGEN SATURATION: 98 % | TEMPERATURE: 97.6 F

## 2018-09-24 DIAGNOSIS — J20.9 ACUTE BRONCHITIS, UNSPECIFIED ORGANISM: ICD-10-CM

## 2018-09-24 PROCEDURE — 71046 X-RAY EXAM CHEST 2 VIEWS: CPT | Mod: TC

## 2018-09-24 PROCEDURE — 99213 OFFICE O/P EST LOW 20 MIN: CPT | Performed by: NURSE PRACTITIONER

## 2018-09-24 PROCEDURE — G0463 HOSPITAL OUTPT CLINIC VISIT: HCPCS | Mod: 25

## 2018-09-24 RX ORDER — PREDNISONE 20 MG/1
TABLET ORAL
Qty: 10 TABLET | Refills: 0 | Status: SHIPPED | OUTPATIENT
Start: 2018-09-24 | End: 2018-09-28

## 2018-09-24 RX ORDER — CODEINE PHOSPHATE AND GUAIFENESIN 10; 100 MG/5ML; MG/5ML
1-2 SOLUTION ORAL EVERY 4 HOURS PRN
Qty: 118 ML | Refills: 0 | Status: SHIPPED | OUTPATIENT
Start: 2018-09-24 | End: 2019-12-03

## 2018-09-24 ASSESSMENT — ENCOUNTER SYMPTOMS
NAUSEA: 0
ACTIVITY CHANGE: 0
TROUBLE SWALLOWING: 0
WEAKNESS: 0
COUGH: 1
RHINORRHEA: 0
DYSURIA: 0
CHILLS: 0
APPETITE CHANGE: 0
PSYCHIATRIC NEGATIVE: 1
STRIDOR: 0
DIARRHEA: 0
VOMITING: 0
FEVER: 0
SINUS PAIN: 1
SINUS PRESSURE: 1

## 2018-09-24 NOTE — DISCHARGE INSTRUCTIONS
Take Prednisone as ordered. Take in the morning.   Take Robitussin with codeine as ordered. Do not drive or participate in activities that require alertness.   Use Albuterol inhaler.   Increase water intake.   Follow up with PCP with any increase in symptoms or concerns.   Return to urgent care or emergency department with any increase in symptoms or concerns.

## 2018-09-24 NOTE — ED TRIAGE NOTES
Pt presents with a productive cough bringing up a greenish colored sputum, sinus congestion,chest congestion for 7 days.

## 2018-09-24 NOTE — ED PROVIDER NOTES
"  History     Chief Complaint   Patient presents with     Cough     The history is provided by the patient. No  was used.     Lashae Caicedo is a 43 year old female who presents with a cough. She just finished a z-pack and cough hasn't improved. \"I haven't slept all weekend.\" She understands that the zpack works for 10 days. Denies fever, chills, or night sweats. Eating and drinking well. Bowel and bladder are working well. She is a tobacco user.     She was seen in  on 9-19-18 for a sinus infection and given a z-pack.       Problem List:    Patient Active Problem List    Diagnosis Date Noted     Abdominal pain, epigastric-REFLUX-Protonix-8/2018 08/31/2018     Priority: Medium     Vaginal discharge with odor--9/2018 08/31/2018     Priority: Medium     Irritable bowel syndrome with both constipation and diarrhea-LACTOSE iintolerant--8/2018 08/31/2018     Priority: Medium     Pelvic pain in female-intermittent x 2 months--9/2018 08/31/2018     Priority: Medium     Urinary frequency--UTI--9/2018 08/31/2018     Priority: Medium     Dyspareunia, female-after--9/2018 08/31/2018     Priority: Medium     Depression, recurrent (H) 10/25/2017     Priority: Medium     Abnormal uterine bleeding--S/P ENDOMETRIAL ABLATION, no  bleeding since--10/2017 09/21/2017     Priority: Medium     Migraine syndrome 09/12/2017     Priority: Medium     Hx of antibiotic allergy    PCN,  SULFA 03/20/2017     Priority: Medium     Obesity, Class II, BMI 35-39.9 03/20/2017     Priority: Medium     TMJ (temporomandibular joint syndrome) 03/20/2017     Priority: Medium        Past Medical History:    Past Medical History:   Diagnosis Date     Abnormal uterine bleeding (AUB)        Past Surgical History:    Past Surgical History:   Procedure Laterality Date     HC ABLATION, ENDOMETRIAL, THERMAL, W/O HYSTEROSCOPIC GUIDANCE       TUBAL LIGATION  2011       Family History:    Family History   Problem Relation Age of Onset     Other " - See Comments Mother      endometriosis       Social History:  Marital Status:  Single [1]  Social History   Substance Use Topics     Smoking status: Current Every Day Smoker     Packs/day: 1.00     Types: Cigarettes     Smokeless tobacco: Former User     Alcohol use Yes      Comment: wine coolers on occasion        Medications:      albuterol (PROAIR HFA/PROVENTIL HFA/VENTOLIN HFA) 108 (90 Base) MCG/ACT inhaler   ciprofloxacin (CIPRO) 500 MG tablet   dicyclomine (BENTYL) 10 MG capsule   guaiFENesin-codeine (ROBITUSSIN AC) 100-10 MG/5ML SOLN solution   naproxen (NAPROSYN) 500 MG tablet   pantoprazole (PROTONIX) 20 MG EC tablet   predniSONE (DELTASONE) 20 MG tablet   propranolol (INDERAL) 20 MG tablet   ranitidine (ZANTAC) 300 MG tablet   EPINEPHrine (EPIPEN/ADRENACLICK/OR ANY BX GENERIC EQUIV) 0.3 MG/0.3ML injection 2-pack         Review of Systems   Constitutional: Negative for activity change, appetite change, chills and fever.   HENT: Positive for congestion, postnasal drip, sinus pain and sinus pressure. Negative for rhinorrhea and trouble swallowing.    Respiratory: Positive for cough, shortness of breath and wheezing. Negative for stridor.    Gastrointestinal: Negative for diarrhea, nausea and vomiting.   Genitourinary: Negative for dysuria.   Skin: Negative for rash.   Neurological: Negative for weakness.   Psychiatric/Behavioral: Negative.        Physical Exam   BP: 131/93  Pulse: 87  Temp: 97.6  F (36.4  C)  Resp: 16  SpO2: 98 %      Physical Exam   Constitutional: She is oriented to person, place, and time. She appears well-developed and well-nourished. No distress.   HENT:   Head: Normocephalic.   Right Ear: External ear normal.   Left Ear: External ear normal.   Mouth/Throat: Oropharynx is clear and moist. No oropharyngeal exudate.   Neck: Normal range of motion. Neck supple.   Cardiovascular: Normal rate, regular rhythm and normal heart sounds.    No murmur heard.  Pulmonary/Chest: Effort normal. No  "respiratory distress. She has wheezes. She has rales.   Abdominal: Soft. She exhibits no distension.   Musculoskeletal: Normal range of motion.   Lymphadenopathy:     She has no cervical adenopathy.   Neurological: She is alert and oriented to person, place, and time. She exhibits normal muscle tone.   Skin: Skin is warm and dry. No rash noted. She is not diaphoretic.   Psychiatric: She has a normal mood and affect. Her behavior is normal.   Nursing note and vitals reviewed.      ED Course     ED Course     Procedures    I personally reviewed the x-rays and there is NO infiltrate. Radiology review pending and nurse will notify patient if there is any change in the treatment plan.    Results for orders placed or performed during the hospital encounter of 09/24/18   Chest XR,  PA & LAT    Narrative    PROCEDURE:  XR CHEST 2 VW    HISTORY:  Cough; .     COMPARISON:  5/2/2018    FINDINGS:   The cardiac silhouette is normal in size. The pulmonary vasculature is  normal.  The lungs are clear. No pleural effusion or pneumothorax.      Impression    IMPRESSION:  No acute cardiopulmonary disease.      RALF VILLEGAS MD     She refused a lab draw.     She deferred any further treatment.     Assessments & Plan (with Medical Decision Making)     She is upset that she had a chest XRAY. \"I only came here for cough medication. I don't need all this stuff.\" I explained to her that was a zpack that her symptoms should be improving and not worsening. I ordered a chest XRAY to make sure she didn't have pneumonia. With how upset she was, I offered the house nurse supervisor to talk with her and she deferred.     Discussed plan of care. She verbalized understanding. All questions answered.     I have reviewed the nursing notes.    I have reviewed the findings, diagnosis, plan and need for follow up with the patient.  Discharged in stable condition.     New Prescriptions    GUAIFENESIN-CODEINE (ROBITUSSIN AC) 100-10 MG/5ML SOLN SOLUTION  "   Take 5-10 mLs by mouth every 4 hours as needed for cough    PREDNISONE (DELTASONE) 20 MG TABLET    Take two tablets (= 40mg) each day for 5 (five) days       Final diagnoses:   Acute bronchitis, unspecified organism     Take Prednisone as ordered. Take in the morning.   Take Robitussin with codeine as ordered. Do not drive or participate in activities that require alertness.   Use Albuterol inhaler.   Increase water intake.   Follow up with PCP with any increase in symptoms or concerns.   Return to urgent care or emergency department with any increase in symptoms or concerns.     HERNANDEZ Franco  9/24/2018  12:46 PM  [facility]       Sierra Mistry NP  09/26/18 [id number]

## 2018-09-24 NOTE — ED AVS SNAPSHOT
[facility]    [address]    [facility] [address]    Phone:  [phone]                                       Lashae Caicedo   MRN: [id number]    Department:  [facility]   Date of Visit:  9/24/2018           Patient Information     Date Of Birth          1975        Your diagnoses for this visit were:     Acute bronchitis, unspecified organism        You were seen by Sierra Mistry NP.      Follow-up Information     Follow up with Melyssa Lewis MD.    Specialty:  Family Practice    Why:  As needed, If symptoms worsen    Contact information:    [address]  [place] [address] [phone]          Follow up with [facility].    Specialty:  EMERGENCY MEDICINE    Why:  As needed, If symptoms worsen    Contact information:    [address]  [place] [address] [phone]    Additional information:    From [facility]: Take [address]. Turn left at [address]. Turn left at the first stoplight on [address]. At the first stop sign, take a right onto [address]. Take a left into the parking lot and continue through until you reach the North enterance of the building.       From [place]: Take [address]. Take the [address] ramp towards [place]. Turn left onto [address]. Take a slight right onto [address]. Turn left at the first stoplight on [address]. At the first stop sign, take a right onto [address]. Take a left into the parking lot and continue through until you reach the North enterance of the building.       From [place]: Take [address]. Take a right at [address]. At the first stop sign, take a right onto [address]. Take a left into the parking lot and continue through until you reach the North enterance of the building.         Discharge Instructions       Take Prednisone as ordered. Take in the morning.   Take Robitussin with codeine  as ordered. Do not drive or participate in activities that require alertness.   Use Albuterol inhaler.   Increase water intake.   Follow up with PCP with any increase in symptoms or concerns.   Return to urgent care or emergency department with any increase in symptoms or concerns.     Discharge References/Attachments     BRONCHITIS, ACUTE (ENGLISH)      Your next 10 appointments already scheduled     Sep 28, 2018  2:30 PM CDT   (Arrive by 2:15 PM)   SHORT with Melyssa Lewis MD   [facility] ([facility] )    [address] [address]   [phone]            Oct 04, 2018  4:00 PM CDT   (Arrive by 3:45 PM)   Office Visit with David Franke Frow, MD   [facility] ([facility] )    [address]  [facility] [address]   [phone]           Bring a current list of meds and any records pertaining to this visit. For Physicals, please bring immunization records and any forms needing to be filled out. Please arrive 10 minutes early to complete paperwork.                 Review of your medicines      START taking        Dose / Directions Last dose taken    guaiFENesin-codeine 100-10 MG/5ML Soln solution   Commonly known as:  ROBITUSSIN AC   Dose:  1-2 tsp.   Quantity:  118 mL        Take 5-10 mLs by mouth every 4 hours as needed for cough   Refills:  0        predniSONE 20 MG tablet   Commonly known as:  DELTASONE   Quantity:  10 tablet        Take two tablets (= 40mg) each day for 5 (five) days   Refills:  0          Our records show that you are taking the medicines listed below. If these are incorrect, please call your family doctor or clinic.        Dose / Directions Last dose taken    albuterol 108 (90 Base) MCG/ACT inhaler   Commonly known as:  PROAIR HFA/PROVENTIL HFA/VENTOLIN HFA   Dose:  2 puff   Quantity:  1 Inhaler        Inhale 2 puffs into the lungs every 6 hours as needed for shortness of breath / dyspnea or  wheezing   Refills:  3        ciprofloxacin 500 MG tablet   Commonly known as:  CIPRO   Dose:  500 mg   Quantity:  14 tablet        Take 1 tablet (500 mg) by mouth 2 times daily   Refills:  0        dicyclomine 10 MG capsule   Commonly known as:  BENTYL   Dose:  10 mg   Quantity:  240 capsule        Take 1 capsule (10 mg) by mouth 4 times daily (with meals and nightly)   Refills:  11        EPINEPHrine 0.3 MG/0.3ML injection 2-pack   Commonly known as:  EPIPEN/ADRENACLICK/or ANY BX GENERIC EQUIV   Dose:  0.3 mg   Quantity:  0.6 mL        Inject 0.3 mLs (0.3 mg) into the muscle as needed for anaphylaxis   Refills:  0        naproxen 500 MG tablet   Commonly known as:  NAPROSYN   Dose:  500 mg   Quantity:  60 tablet        Take 1 tablet (500 mg) by mouth 2 times daily as needed for moderate pain   Refills:  1        pantoprazole 20 MG EC tablet   Commonly known as:  PROTONIX   Dose:  20 mg   Quantity:  60 tablet        Take 1 tablet (20 mg) by mouth daily Take by mouth 30-60 minutes before a meal.   Refills:  0        propranolol 20 MG tablet   Commonly known as:  INDERAL   Dose:  20 mg   Quantity:  180 tablet        Take 1 tablet (20 mg) by mouth 2 times daily   Refills:  1        ranitidine 300 MG tablet   Commonly known as:  ZANTAC   Dose:  300 mg   Quantity:  30 tablet        Take 1 tablet (300 mg) by mouth At Bedtime   Refills:  11                Prescriptions were sent or printed at these locations (2 Prescriptions)                   [facility] [address]  [place], [place] - [address] [address]   [address] [address], [place] [address]    Telephone:  [phone]   Fax:  [phone]   Hours:                  E-Prescribed (1 of 2)         predniSONE (DELTASONE) 20 MG tablet                 Printed at Department/Unit printer (1 of 2)         guaiFENesin-codeine (ROBITUSSIN AC) 100-10 MG/5ML SOLN solution                Procedures and tests performed during your visit     Chest XR,  PA & LAT      Orders Needing Specimen Collection      None      Pending Results     Date and Time Order Name Status Description    9/24/2018 1225 Chest XR,  PA & LAT In process             Pending Culture Results     No orders found from 9/22/2018 to 9/25/2018.            Thank you for choosing [facility]       Thank you for choosing [facility] for your care. Our goal is always to provide you with excellent care. Hearing back from our patients is one way we can continue to improve our services. Please take a few minutes to complete the written survey that you may receive in the mail after you visit with us. Thank you!        Care EveryWhere ID     This is your Care EveryWhere ID. This could be used by other organizations to access your [facility] medical records  [id number]        Equal Access to Services     SHANTHI BURTON : Lillian Mendez, madison lynch, tarah pike, brissa nichols. So [facility] [phone].    ATENCIÓN: Si habla español, tiene a crystal disposición servicios gratuitos de asistencia lingüística. Llame al [phone].    We comply with applicable federal civil rights laws and [place] laws. We do not discriminate on the basis of race, color, national origin, age, disability, sex, sexual orientation, or gender identity.            After Visit Summary       This is your record. Keep this with you and show to your community pharmacist(s) and doctor(s) at your next visit.

## 2018-09-24 NOTE — TELEPHONE ENCOUNTER
"COUGH    Lashae Caicedo is a 43 year old female who calls with cough.  She was seen in Urgent care last week and was prescribed Z-marian which did not resolve cough.  She states OTC cough medicine is not helping.  She is requesting a cough medicine called in.  Denies fever, chest pain, difficulty breathing, or any other symptoms.  Patient does state she has chest congestion and coughing \"fits\".    RECOMMENDED DISPOSITION:  Offered appointment today which patient could not take.  Advised Urgent care or to call back if she has any other questions or would like to make an appointment.     Will comply with recommendation: Yes    If further questions/concerns or if symptoms do not improve, worsen or new symptoms develop, call your PCP or [facility] as soon as possible.      Guideline used: Adult Telephone Protocols Office Version 3rd Edition - Carlos Ball MD, FACEP      Sima Champagne RN      "

## 2018-09-24 NOTE — ED AVS SNAPSHOT
[facility]    [address] [address]    Phone:  [phone]                                       Lashae Caicedo   MRN: [id number]    Department:  [facility]   Date of Visit:  9/24/2018           After Visit Summary Signature Page     I have received my discharge instructions, and my questions have been answered. I have discussed any challenges I see with this plan with the nurse or doctor.    ..........................................................................................................................................  Patient/Patient Representative Signature      ..........................................................................................................................................  Patient Representative Print Name and Relationship to Patient    ..................................................               ................................................  Date                                   Time    ..........................................................................................................................................  Reviewed by Signature/Title    ...................................................              ..............................................  Date                                               Time          22EPIC Rev 08/18

## 2018-09-26 ASSESSMENT — ENCOUNTER SYMPTOMS
WHEEZING: 1
SHORTNESS OF BREATH: 1

## 2018-09-28 ENCOUNTER — TELEPHONE (OUTPATIENT)
Dept: FAMILY MEDICINE | Facility: OTHER | Age: 43
End: 2018-09-28

## 2018-09-28 ENCOUNTER — OFFICE VISIT (OUTPATIENT)
Dept: FAMILY MEDICINE | Facility: OTHER | Age: 43
End: 2018-09-28
Attending: FAMILY MEDICINE
Payer: COMMERCIAL

## 2018-09-28 VITALS
TEMPERATURE: 98.2 F | WEIGHT: 232 LBS | HEART RATE: 100 BPM | RESPIRATION RATE: 18 BRPM | DIASTOLIC BLOOD PRESSURE: 84 MMHG | HEIGHT: 64 IN | BODY MASS INDEX: 39.61 KG/M2 | OXYGEN SATURATION: 97 % | SYSTOLIC BLOOD PRESSURE: 128 MMHG

## 2018-09-28 DIAGNOSIS — M79.89 LEG SWELLING: ICD-10-CM

## 2018-09-28 DIAGNOSIS — Z72.0 TOBACCO ABUSE: Primary | ICD-10-CM

## 2018-09-28 DIAGNOSIS — M54.41 ACUTE RIGHT-SIDED LOW BACK PAIN WITH RIGHT-SIDED SCIATICA: Primary | ICD-10-CM

## 2018-09-28 DIAGNOSIS — J32.9 SINUSITIS, UNSPECIFIED CHRONICITY, UNSPECIFIED LOCATION: ICD-10-CM

## 2018-09-28 PROCEDURE — G0463 HOSPITAL OUTPT CLINIC VISIT: HCPCS

## 2018-09-28 PROCEDURE — 99213 OFFICE O/P EST LOW 20 MIN: CPT | Performed by: FAMILY MEDICINE

## 2018-09-28 RX ORDER — FLUTICASONE PROPIONATE 50 MCG
1-2 SPRAY, SUSPENSION (ML) NASAL DAILY
Qty: 1 BOTTLE | Refills: 11 | Status: SHIPPED | OUTPATIENT
Start: 2018-09-28 | End: 2020-02-18

## 2018-09-28 ASSESSMENT — PAIN SCALES - GENERAL: PAINLEVEL: NO PAIN (0)

## 2018-09-28 NOTE — NURSING NOTE
"Chief Complaint   Patient presents with     Work Comp     work comp       Initial /84  Pulse 100  Temp 98.2  F (36.8  C) (Tympanic)  Resp 18  Ht 5' 4\" (1.626 m)  Wt 232 lb (105.2 kg)  SpO2 97%  BMI 39.82 kg/m2 Estimated body mass index is 39.82 kg/(m^2) as calculated from the following:    Height as of this encounter: 5' 4\" (1.626 m).    Weight as of this encounter: 232 lb (105.2 kg).  Medication Reconciliation: complete    Nica Jones LPN  "

## 2018-09-28 NOTE — PROGRESS NOTES
Occupational Visit     Subjective:  Lashae Caicedo, 43 year old, female is seen 9/28/18.  The date of injury is 8/17/18.  The patient is employed at [employer].    Pt states she fell onto her right hip after a roller chair slid out from under her. Was seen in the ED on 7/31 and imaging was done and negative. Seen by me on 8/17 for follow up visit. At that time, continued to have pain and ntermittent numbness/tingling down the right leg that occurs she if stands or sits in the same position too long. NSAIDs given for pain.     Patient states her pain is fully resolved at this time. She would like to released from any restrictions at work. 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     Allergies   Allergen Reactions     Latex Hives     No Clinical Screening - See Comments      All cillins      Penicillins      Sulfa Drugs      Aspirin Rash     Sumatriptan Rash     Review of Systems:  Constitutional, HEENT, cardiovascular, pulmonary, gi and gu systems are negative, except as otherwise noted.      OBJECTIVE:  Vitals: B/P: Data Unavailable, T: Data Unavailable, P: Data Unavailable, R: Data Unavailable      Exam:  GENERAL: healthy, alert, well nourished, well hydrated, no distress  RESP: lungs clear to auscultation - no rales, no rhonchi, no wheezes  CV: regular rates and rhythm, normal S1 S2, no S3 or S4 and no murmur, no click or rub -  ABDOMEN: soft, no tenderness, no  hepatosplenomegaly, no masses, normal bowel sounds  Comprehensive back pain exam:  No tenderness, Range of motion not limited by pain and Lower extremity strength functional and equal on both sides      Labs: None      ASSESSMENT/PLAN:    Lashae was seen today for work comp.    Diagnoses and all orders for this visit:    Acute right-sided low back pain with right-sided sciatica  Resolved with nsaids and conservative measures. Released to full work duties.     Melyssa Lewis MD

## 2018-09-28 NOTE — MR AVS SNAPSHOT
After Visit Summary   9/28/2018    Lashae Middleton    MRN: [id number]           Patient Information     Date Of Birth          1975        Visit Information        Provider Department      9/28/2018 2:30 PM Melyssa Lewis MD [facility]        Today's Diagnoses     Acute right-sided low back pain with right-sided sciatica    -  1    Leg swelling           Follow-ups after your visit        Your next 10 appointments already scheduled     Oct 04, 2018  4:00 PM CDT   (Arrive by 3:45 PM)   Office Visit with David Franke Frow, MD   [facility]    [address]  [facility] [address]   [phone]           Bring a current list of meds and any records pertaining to this visit. For Physicals, please bring immunization records and any forms needing to be filled out. Please arrive 10 minutes early to complete paperwork.              Who to contact     If you have questions or need follow up information about today's clinic visit or your schedule please contact [facility] directly at [phone].  Normal or non-critical lab and imaging results will be communicated to you by MyChart, letter or phone within 4 business days after the clinic has received the results. If you do not hear from us within 7 days, please contact the clinic through MyChart or phone. If you have a critical or abnormal lab result, we will notify you by phone as soon as possible.  Submit refill requests through [employer] or call your pharmacy and they will forward the refill request to us. Please allow 3 business days for your refill to be completed.          Additional Information About Your Visit        Care EveryWhere ID     This is your Care EveryWhere ID. This could be used by other organizations to access your [place] medical records  [id number]        Your Vitals Were     Pulse Temperature Respirations Height Pulse Oximetry  "BMI (Body Mass Index)    100 98.2  F (36.8  C) (Tympanic) 18 5' 4\" (1.626 m) 97% 39.82 kg/m2       Blood Pressure from Last 3 Encounters:   09/28/18 128/84   09/24/18 131/93   09/19/18 129/86    Weight from Last 3 Encounters:   09/28/18 232 lb (105.2 kg)   09/19/18 221 lb (100.2 kg)   08/31/18 230 lb (104.3 kg)              Today, you had the following     No orders found for display         Today's Medication Changes          These changes are accurate as of 9/28/18 11:59 PM.  If you have any questions, ask your nurse or doctor.               Start taking these medicines.        Dose/Directions    fluticasone 50 MCG/ACT spray   Commonly known as:  FLONASE   Used for:  Sinusitis, unspecified chronicity, unspecified location   Started by:  Melyssa Lewis MD        Dose:  1-2 spray   Spray 1-2 sprays into both nostrils daily   Quantity:  1 Bottle   Refills:  11       varenicline 0.5 MG X 11 & 1 MG X 42 tablet   Commonly known as:  CHANTIX STARTING MONTH CECILE   Used for:  Tobacco abuse   Started by:  Melyssa Lewis MD        Take 0.5 mg tab daily for 3 days, then 0.5 mg tab twice daily for 4 days, then 1 mg twice daily.   Quantity:  53 tablet   Refills:  0         Stop taking these medicines if you haven't already. Please contact your care team if you have questions.     predniSONE 20 MG tablet   Commonly known as:  DELTASONE   Stopped by:  Melyssa Lewis MD                Where to get your medicines      These medications were sent to [facility] - [place] - [address]  [address], [place] [address]     Phone:  [phone]     fluticasone 50 MCG/ACT spray    varenicline 0.5 MG X 11 & 1 MG X 42 tablet                Primary Care Provider Office Phone # Fax #    Melyssa Lewis [phone] [address] [facility] [address]        Equal Access to Services     [facility] AH: Lillian Mendez, waaxda luqadatarah willingham waxay idiin " peter eastmanlilian laMichellelisandra ah. So [facility] [phone].    ATENCIÓN: Si georgela carlos enrique, tiene a crystal disposición servicios gratuitos de asistencia lingüística. Elizabeth lewis [phone].    We comply with applicable federal civil rights laws and [place] laws. We do not discriminate on the basis of race, color, national origin, age, disability, sex, sexual orientation, or gender identity.            Thank you!     Thank you for choosing [facility]  for your care. Our goal is always to provide you with excellent care. Hearing back from our patients is one way we can continue to improve our services. Please take a few minutes to complete the written survey that you may receive in the mail after your visit with us. Thank you!             Your Updated Medication List - Protect others around you: Learn how to safely use, store and throw away your medicines at www.disposemymeds.org.          This list is accurate as of 9/28/18 11:59 PM.  Always use your most recent med list.                   Brand Name Dispense Instructions for use Diagnosis    albuterol 108 (90 Base) MCG/ACT inhaler    PROAIR HFA/PROVENTIL HFA/VENTOLIN HFA    1 Inhaler    Inhale 2 puffs into the lungs every 6 hours as needed for shortness of breath / dyspnea or wheezing    Mild intermittent asthma without complication       ciprofloxacin 500 MG tablet    CIPRO    14 tablet    Take 1 tablet (500 mg) by mouth 2 times daily    Pelvic pain in female       dicyclomine 10 MG capsule    BENTYL    240 capsule    Take 1 capsule (10 mg) by mouth 4 times daily (with meals and nightly)    Irritable bowel syndrome with both constipation and diarrhea       EPINEPHrine 0.3 MG/0.3ML injection 2-pack    EPIPEN/ADRENACLICK/or ANY BX GENERIC EQUIV    0.6 mL    Inject 0.3 mLs (0.3 mg) into the muscle as needed for anaphylaxis    Hx of antibiotic allergy       fluticasone 50 MCG/ACT spray    FLONASE    1 Bottle    Spray 1-2 sprays into both nostrils daily     Sinusitis, unspecified chronicity, unspecified location       guaiFENesin-codeine 100-10 MG/5ML Soln solution    ROBITUSSIN AC    118 mL    Take 5-10 mLs by mouth every 4 hours as needed for cough        naproxen 500 MG tablet    NAPROSYN    60 tablet    Take 1 tablet (500 mg) by mouth 2 times daily as needed for moderate pain    Acute right-sided low back pain with right-sided sciatica       pantoprazole 20 MG EC tablet    PROTONIX    60 tablet    Take 1 tablet (20 mg) by mouth daily Take by mouth 30-60 minutes before a meal.    Abdominal pain, epigastric       propranolol 20 MG tablet    INDERAL    180 tablet    Take 1 tablet (20 mg) by mouth 2 times daily    Chronic migraine without aura without status migrainosus, not intractable       ranitidine 300 MG tablet    ZANTAC    30 tablet    Take 1 tablet (300 mg) by mouth At Bedtime    Irritable bowel syndrome with both constipation and diarrhea       varenicline 0.5 MG X 11 & 1 MG X 42 tablet    CHANTIX STARTING MONTH CECILE    53 tablet    Take 0.5 mg tab daily for 3 days, then 0.5 mg tab twice daily for 4 days, then 1 mg twice daily.    Tobacco abuse

## 2018-09-28 NOTE — PROGRESS NOTES
"  SUBJECTIVE:   Lashae Caicedo is a 43 year old female who presents to clinic today for the following health issues:      Musculoskeletal problem/pain      Duration: ***    Description  Location: Right Hip[    Intensity:  {mild,moderate,severe:940780}    Accompanying signs and symptoms: {OTHER MS SYMPTOMS:776405::\"none\"}    History  Previous similar problem: { :419741}  Previous evaluation:  {PREVIOUS ms evaluation:901378::\"none\"}    Precipitating or alleviating factors:  Trauma or overuse: { :685420}  Aggravating factors include: {AGGRAVATING MS FACTORS CHRONIC PROB:107420::\"none\"}    Therapies tried and outcome: {MS RELIEF ITEMS:518692::\"nothing\"}      {additional problems for provider to add:681076}    Problem list and histories reviewed & adjusted, as indicated.  Additional history: {NONE - AS DOCUMENTED:021057::\"as documented\"}    {HIST REVIEW/ LINKS 2:014024}    Reviewed and updated as needed this visit by clinical staff       Reviewed and updated as needed this visit by Provider         {PROVIDER CHARTING PREFERENCE:490991}  "

## 2018-11-09 ENCOUNTER — TELEPHONE (OUTPATIENT)
Dept: FAMILY MEDICINE | Facility: OTHER | Age: 43
End: 2018-11-09

## 2018-11-09 NOTE — TELEPHONE ENCOUNTER
"Patient called back and left a voicemail -she is tired of being in pain and is having a hard time moving. Patient stated this is the same pain she has been seen for - pelvic pain.  Patient states that  told her to call if she needed something for pain and it would be called in for her and that she wouldn't have to go through \"this crap\" Will route message to  for review.   "

## 2018-11-09 NOTE — TELEPHONE ENCOUNTER
I typically do not fill pain medications for pelvic pain over the phone. If her pain is severe enough to require this, she really needs to be evaluated. Again, also recommend that she is seen by GI, this referral has been made.   Melyssa Lewis MD

## 2018-11-09 NOTE — TELEPHONE ENCOUNTER
Attempted to call patient, no answer. Left message informing to call back. (Gave Dr. Lewis's nurse - Nica's phone number)    Rosanne Jasmine LPN

## 2018-11-09 NOTE — TELEPHONE ENCOUNTER
11:55 AM    Reason for Call: Phone Call    Description: pt is having pain, and would like to have rx called in. Pt has apt on nov 23rd and doesn't want to be in pain that long. [facility]    Was an appointment offered for this call? No   If yes : Appointment type              Date    Preferred method for responding to this message: telephone  What is your phone number ?[phone]    If we cannot reach you directly, may we leave a detailed response at the number you provided? {YES /     Can this message wait until your PCP/provider returns, if available today? no    Amirah Mckenzie

## 2018-11-12 NOTE — TELEPHONE ENCOUNTER
Please see note below, please offer patient appt to discuss pain medications per Dr. Joshua BROWN

## 2018-11-23 NOTE — TELEPHONE ENCOUNTER
"Patient called triage today around 0815. Patient stated \"I'm in so much pain in my pelvis its hard to work.\" \"Dr. Lewis told me she would just give me pain meds without being seen last time I saw her.\" \"no one has been returning my call there.\" Writer reviewed the note below with patient and that patient was called and voicemails were left. Writer also reviewed Dr. Lewis's note that patient needs to be evaluated first. Patient then stated, \"I work nights it's hard for me to come in and I want to reschedule.\" Pt rescheduled per her request.   Patient then stated \"Dr. Lewis also said she would make a referral to a specialist, but hasn't.\"  Writer reviewed referral tab with patient. Pt then stated \"well she did make a referral but it was in [place] and I don't have a car to get there so I didn't make an appointment.\"  "

## 2018-11-23 NOTE — TELEPHONE ENCOUNTER
She was seen by OB/GYN here in [place]. Referred also to GI back in June. Per those notes, information was sent to [facility] to see provider in [place]. Can we look into the status of this? I will not fill pain meds over the phone. Thanks.

## 2018-11-27 NOTE — TELEPHONE ENCOUNTER
Called and spoke with Candi at [facility]. Pt declined any and all appointments offered. They are booked out now till April for [place] location. If we would like to refer again, fax info and pt has to call and schedule her own appointment.
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
## 2018-12-11 ENCOUNTER — OFFICE VISIT (OUTPATIENT)
Dept: FAMILY MEDICINE | Facility: OTHER | Age: 43
End: 2018-12-11
Attending: FAMILY MEDICINE
Payer: COMMERCIAL

## 2018-12-11 VITALS
OXYGEN SATURATION: 93 % | WEIGHT: 216 LBS | BODY MASS INDEX: 37.08 KG/M2 | SYSTOLIC BLOOD PRESSURE: 122 MMHG | TEMPERATURE: 97.5 F | DIASTOLIC BLOOD PRESSURE: 92 MMHG | HEART RATE: 87 BPM

## 2018-12-11 DIAGNOSIS — R10.2 PELVIC PAIN IN FEMALE: Primary | ICD-10-CM

## 2018-12-11 DIAGNOSIS — G43.709 CHRONIC MIGRAINE WITHOUT AURA WITHOUT STATUS MIGRAINOSUS, NOT INTRACTABLE: ICD-10-CM

## 2018-12-11 DIAGNOSIS — K04.7 DENTAL INFECTION: ICD-10-CM

## 2018-12-11 PROCEDURE — G0463 HOSPITAL OUTPT CLINIC VISIT: HCPCS

## 2018-12-11 PROCEDURE — 99213 OFFICE O/P EST LOW 20 MIN: CPT | Performed by: FAMILY MEDICINE

## 2018-12-11 RX ORDER — OXYCODONE HYDROCHLORIDE 5 MG/1
5 TABLET ORAL EVERY 6 HOURS PRN
Qty: 12 TABLET | Refills: 0 | Status: SHIPPED | OUTPATIENT
Start: 2018-12-11 | End: 2018-12-14

## 2018-12-11 RX ORDER — PROPRANOLOL HYDROCHLORIDE 20 MG/1
20 TABLET ORAL 2 TIMES DAILY
Qty: 180 TABLET | Refills: 3 | Status: SHIPPED | OUTPATIENT
Start: 2018-12-11 | End: 2020-01-01

## 2018-12-11 RX ORDER — CYCLOBENZAPRINE HCL 5 MG
5-10 TABLET ORAL 3 TIMES DAILY PRN
Qty: 60 TABLET | Refills: 1 | Status: SHIPPED | OUTPATIENT
Start: 2018-12-11 | End: 2019-04-01

## 2018-12-11 RX ORDER — DOXYCYCLINE 100 MG/1
100 CAPSULE ORAL 2 TIMES DAILY
Qty: 20 CAPSULE | Refills: 0 | Status: SHIPPED | OUTPATIENT
Start: 2018-12-11 | End: 2019-05-28

## 2018-12-11 ASSESSMENT — ASTHMA QUESTIONNAIRES
QUESTION_4 LAST FOUR WEEKS HOW OFTEN HAVE YOU USED YOUR RESCUE INHALER OR NEBULIZER MEDICATION (SUCH AS ALBUTEROL): NOT AT ALL
QUESTION_5 LAST FOUR WEEKS HOW WOULD YOU RATE YOUR ASTHMA CONTROL: COMPLETELY CONTROLLED
QUESTION_1 LAST FOUR WEEKS HOW MUCH OF THE TIME DID YOUR ASTHMA KEEP YOU FROM GETTING AS MUCH DONE AT WORK, SCHOOL OR AT HOME: NONE OF THE TIME
QUESTION_3 LAST FOUR WEEKS HOW OFTEN DID YOUR ASTHMA SYMPTOMS (WHEEZING, COUGHING, SHORTNESS OF BREATH, CHEST TIGHTNESS OR PAIN) WAKE YOU UP AT NIGHT OR EARLIER THAN USUAL IN THE MORNING: ONCE OR TWICE
QUESTION_2 LAST FOUR WEEKS HOW OFTEN HAVE YOU HAD SHORTNESS OF BREATH: NOT AT ALL
ACT_TOTALSCORE: 24

## 2018-12-11 ASSESSMENT — ANXIETY QUESTIONNAIRES
3. WORRYING TOO MUCH ABOUT DIFFERENT THINGS: NEARLY EVERY DAY
6. BECOMING EASILY ANNOYED OR IRRITABLE: NEARLY EVERY DAY
2. NOT BEING ABLE TO STOP OR CONTROL WORRYING: NEARLY EVERY DAY
7. FEELING AFRAID AS IF SOMETHING AWFUL MIGHT HAPPEN: MORE THAN HALF THE DAYS
IF YOU CHECKED OFF ANY PROBLEMS ON THIS QUESTIONNAIRE, HOW DIFFICULT HAVE THESE PROBLEMS MADE IT FOR YOU TO DO YOUR WORK, TAKE CARE OF THINGS AT HOME, OR GET ALONG WITH OTHER PEOPLE: EXTREMELY DIFFICULT
5. BEING SO RESTLESS THAT IT IS HARD TO SIT STILL: MORE THAN HALF THE DAYS
4. TROUBLE RELAXING: NEARLY EVERY DAY
1. FEELING NERVOUS, ANXIOUS, OR ON EDGE: NEARLY EVERY DAY
GAD7 TOTAL SCORE: 19

## 2018-12-11 ASSESSMENT — PATIENT HEALTH QUESTIONNAIRE - PHQ9: SUM OF ALL RESPONSES TO PHQ QUESTIONS 1-9: 21

## 2018-12-11 ASSESSMENT — PAIN SCALES - GENERAL: PAINLEVEL: SEVERE PAIN (7)

## 2018-12-11 NOTE — NURSING NOTE
"Chief Complaint   Patient presents with     Musculoskeletal Problem     Asthma       Initial BP (!) 122/92 (Patient Position: Sitting)   Pulse 87   Temp 97.5  F (36.4  C) (Tympanic)   Wt 98 kg (216 lb)   LMP  (LMP Unknown)   SpO2 93%   BMI 37.08 kg/m   Estimated body mass index is 37.08 kg/m  as calculated from the following:    Height as of 9/28/18: 1.626 m (5' 4\").    Weight as of this encounter: 98 kg (216 lb).  Medication Reconciliation: complete    Lianna Hills LPN  "

## 2018-12-11 NOTE — LETTER
My Asthma Action Plan  Name: Lashae Middleton   YOB: 1975  Date: 12/4/2018   My doctor: Melyssa Lewis MD   [facility]        My Control Medicine: None  My Rescue Medicine: Albuterol (Proair/Ventolin/Proventil) inhaler 108(90)base 2puffs every 6 hours as needed   My Asthma Severity: intermittent  Avoid your asthma triggers: emotions and dry air               GREEN ZONE   Good Control    I feel good    No cough or wheeze    Can work, sleep and play without asthma symptoms       Take your asthma control medicine every day.     1. If exercise triggers your asthma, take your rescue medication    15 minutes before exercise or sports, and    During exercise if you have asthma symptoms  2. Spacer to use with inhaler: If you have a spacer, make sure to use it with your inhaler             YELLOW ZONE Getting Worse  I have ANY of these:    I do not feel good    Cough or wheeze    Chest feels tight    Wake up at night   1. Keep taking your Green Zone medications  2. Start taking your rescue medicine:    every 20 minutes for up to 1 hour. Then every 4 hours for 24-48 hours.  3. If you stay in the Yellow Zone for more than 12-24 hours, contact your doctor.  4. If you do not return to the Green Zone in 12-24 hours or you get worse, start taking your oral steroid medicine if prescribed by your provider.           RED ZONE Medical Alert - Get Help  I have ANY of these:    I feel awful    Medicine is not helping    Breathing getting harder    Trouble walking or talking    Nose opens wide to breathe       1. Take your rescue medicine NOW  2. If your provider has prescribed an oral steroid medicine, start taking it NOW  3. Call your doctor NOW  4. If you are still in the Red Zone after 20 minutes and you have not reached your doctor:    Take your rescue medicine again and    Call 911 or go to the emergency room right away    See your regular doctor within 2 weeks of an Emergency  Room or Urgent Care visit for follow-up treatment.          Annual Reminders:  Meet with Asthma Educator,  Flu Shot in the Fall, consider Pneumonia Vaccination for patients with asthma (aged 19 and older).    Pharmacy: [facility] [address] [place], [place] [address], [place] - [address] [facility] [address]                      Asthma Triggers  How To Control Things That Make Your Asthma Worse    Triggers are things that make your asthma worse.  Look at the list below to help you find your triggers and what you can do about them.  You can help prevent asthma flare-ups by staying away from your triggers.      Trigger                                                          What you can do   Cigarette Smoke  Tobacco smoke can make asthma worse. Do not allow smoking in your home, car or around you.  Be sure no one smokes at a child s day care or school.  If you smoke, ask your health care provider for ways to help you quit.  Ask family members to quit too.  Ask your health care provider for a referral to Quit Plan to help you quit smoking, or call 5-004-903-PLAN.     Colds, Flu, Bronchitis  These are common triggers of asthma. Wash your hands often.  Don t touch your eyes, nose or mouth.  Get a flu shot every year.     Dust Mites  These are tiny bugs that live in cloth or carpet. They are too small to see. Wash sheets and blankets in hot water every week.   Encase pillows and mattress in dust mite proof covers.  Avoid having carpet if you can. If you have carpet, vacuum weekly.   Use a dust mask and HEPA vacuum.   Pollen and Outdoor Mold  Some people are allergic to trees, grass, or weed pollen, or molds. Try to keep your windows closed.  Limit time out doors when pollen count is high.   Ask you health care provider about taking medicine during allergy season.     Animal Dander  Some people are allergic to skin flakes, urine or saliva from pets with fur or feathers. Keep pets with fur or feathers out of your home.    If you can t keep the pet outdoors,  then keep the pet out of your bedroom.  Keep the bedroom door closed.  Keep pets off cloth furniture and away from stuffed toys.     Mice, Rats, and Cockroaches  Some people are allergic to the waste from these pests.   Cover food and garbage.  Clean up spills and food crumbs.  Store grease in the refrigerator.   Keep food out of the bedroom.   Indoor Mold  This can be a trigger if your home has high moisture. Fix leaking faucets, pipes, or other sources of water.   Clean moldy surfaces.  Dehumidify basement if it is damp and smelly.   Smoke, Strong Odors, and Sprays  These can reduce air quality. Stay away from strong odors and sprays, such as perfume, powder, hair spray, paints, smoke incense, paint, cleaning products, candles and new carpet.   Exercise or Sports  Some people with asthma have this trigger. Be active!  Ask your doctor about taking medicine before sports or exercise to prevent symptoms.    Warm up for 5-10 minutes before and after sports or exercise.     Other Triggers of Asthma  Cold air:  Cover your nose and mouth with a scarf.  Sometimes laughing or crying can be a trigger.  Some medicines and food can trigger asthma.

## 2018-12-12 ASSESSMENT — ASTHMA QUESTIONNAIRES: ACT_TOTALSCORE: 24

## 2018-12-12 ASSESSMENT — ANXIETY QUESTIONNAIRES: GAD7 TOTAL SCORE: 19

## 2019-01-04 NOTE — PROGRESS NOTES
"  SUBJECTIVE:   Lashae Middleton is a 43 year old female who presents to clinic today for the following health issues:        Musculoskeletal problem/pain      Duration: months - FOLLOW UP APT    Description  Location: pelvic pain    Intensity:  mild    Accompanying signs and symptoms: none    History  Previous similar problem: no   Previous evaluation:  none    Precipitating or alleviating factors:  Trauma or overuse: no   Aggravating factors include: none    Therapies tried and outcome: nothing    Patient states flexeril use prn has been helpful for her pelvic pain. She is no longer having intercourse, so she does not know if it helps with this, however. Her  just recently left her \"on a break.\" Without his financial support, however, has been under a great deal of stress. Has not made appt with OB due to new work schedule. Working at [employer] now. Is happy with this position.     Problem list and histories reviewed & adjusted, as indicated.  Additional history: as documented    Labs reviewed in EPIC    Reviewed and updated as needed this visit by clinical staff  Tobacco  Allergies  Meds  Problems  Med Hx  Surg Hx  Fam Hx       Reviewed and updated as needed this visit by Provider  Tobacco  Allergies  Meds  Problems  Med Hx  Surg Hx  Fam Hx         ROS:  Constitutional, HEENT, cardiovascular, pulmonary, gi and gu systems are negative, except as otherwise noted.    OBJECTIVE:     /76 (BP Location: Right arm, Patient Position: Sitting, Cuff Size: Adult Large)   Pulse 83   Temp 97.9  F (36.6  C) (Tympanic)   Ht 1.626 m (5' 4\")   Wt 98.9 kg (218 lb)   SpO2 99%   BMI 37.42 kg/m    Body mass index is 37.42 kg/m .  GENERAL: healthy, alert and no distress  NECK: no adenopathy, no asymmetry, masses, or scars and thyroid normal to palpation  RESP: lungs clear to auscultation - no rales, rhonchi or wheezes  CV: regular rate and rhythm, normal S1 S2, no S3 or S4, no murmur, click or rub, no " peripheral edema  ABDOMEN: soft, nontender, no hepatosplenomegaly, no masses and bowel sounds normal  MS: no gross musculoskeletal defects noted    Diagnostic Test Results:  none     ASSESSMENT/PLAN:     Adjustment disorder with depressed mood  Recently  from her spouse. Also lost his financial support. Would like help with resources for food stamps, etc. Also interested in supportive counseling. Referral to our [facility] team made.   - [facility]    Pelvic pain in female  Improved with flexeril. ? Pelvic muscle spasms. Seems to be helping at this time. Continue for now. Second opinion with OB pending pt making appt.     Tobacco abuse / Tobacco abuse counseling  Declines assist.   - Tobacco Cessation - Order to Satisfy Health Maintenance    Melyssa Lewis MD  [facility]

## 2019-01-11 ENCOUNTER — OFFICE VISIT (OUTPATIENT)
Dept: FAMILY MEDICINE | Facility: OTHER | Age: 44
End: 2019-01-11
Attending: FAMILY MEDICINE
Payer: COMMERCIAL

## 2019-01-11 VITALS
TEMPERATURE: 97.9 F | DIASTOLIC BLOOD PRESSURE: 76 MMHG | HEART RATE: 83 BPM | OXYGEN SATURATION: 99 % | BODY MASS INDEX: 37.22 KG/M2 | WEIGHT: 218 LBS | HEIGHT: 64 IN | SYSTOLIC BLOOD PRESSURE: 108 MMHG

## 2019-01-11 DIAGNOSIS — Z71.6 TOBACCO ABUSE COUNSELING: ICD-10-CM

## 2019-01-11 DIAGNOSIS — R10.2 PELVIC PAIN IN FEMALE: ICD-10-CM

## 2019-01-11 DIAGNOSIS — Z72.0 TOBACCO ABUSE: ICD-10-CM

## 2019-01-11 DIAGNOSIS — F43.21 ADJUSTMENT DISORDER WITH DEPRESSED MOOD: Primary | ICD-10-CM

## 2019-01-11 PROCEDURE — G0463 HOSPITAL OUTPT CLINIC VISIT: HCPCS | Performed by: FAMILY MEDICINE

## 2019-01-11 PROCEDURE — 99213 OFFICE O/P EST LOW 20 MIN: CPT | Performed by: FAMILY MEDICINE

## 2019-01-11 RX ORDER — NAPROXEN 500 MG/1
500 TABLET ORAL 2 TIMES DAILY PRN
Qty: 60 TABLET | Refills: 1 | Status: SHIPPED | OUTPATIENT
Start: 2019-01-11 | End: 2019-12-03

## 2019-01-11 ASSESSMENT — ANXIETY QUESTIONNAIRES
7. FEELING AFRAID AS IF SOMETHING AWFUL MIGHT HAPPEN: NEARLY EVERY DAY
3. WORRYING TOO MUCH ABOUT DIFFERENT THINGS: NEARLY EVERY DAY
2. NOT BEING ABLE TO STOP OR CONTROL WORRYING: NEARLY EVERY DAY
5. BEING SO RESTLESS THAT IT IS HARD TO SIT STILL: NEARLY EVERY DAY
GAD7 TOTAL SCORE: 21
6. BECOMING EASILY ANNOYED OR IRRITABLE: NEARLY EVERY DAY
1. FEELING NERVOUS, ANXIOUS, OR ON EDGE: NEARLY EVERY DAY

## 2019-01-11 ASSESSMENT — PAIN SCALES - GENERAL: PAINLEVEL: NO PAIN (0)

## 2019-01-11 ASSESSMENT — PATIENT HEALTH QUESTIONNAIRE - PHQ9
5. POOR APPETITE OR OVEREATING: NEARLY EVERY DAY
SUM OF ALL RESPONSES TO PHQ QUESTIONS 1-9: 23

## 2019-01-11 ASSESSMENT — MIFFLIN-ST. JEOR: SCORE: 1618.84

## 2019-01-11 NOTE — PATIENT INSTRUCTIONS

## 2019-01-11 NOTE — NURSING NOTE
"Chief Complaint   Patient presents with     Musculoskeletal Problem       Initial /76 (BP Location: Right arm, Patient Position: Sitting, Cuff Size: Adult Large)   Pulse 83   Temp 97.9  F (36.6  C) (Tympanic)   Ht 1.626 m (5' 4\")   Wt 98.9 kg (218 lb)   SpO2 99%   BMI 37.42 kg/m   Estimated body mass index is 37.42 kg/m  as calculated from the following:    Height as of this encounter: 1.626 m (5' 4\").    Weight as of this encounter: 98.9 kg (218 lb).  Medication Reconciliation: complete    Radha Dempsey LPN  "

## 2019-01-12 ASSESSMENT — ANXIETY QUESTIONNAIRES: GAD7 TOTAL SCORE: 21

## 2019-02-04 ENCOUNTER — TELEPHONE (OUTPATIENT)
Dept: BEHAVIORAL HEALTH | Facility: OTHER | Age: 44
End: 2019-02-04

## 2019-03-11 ENCOUNTER — TELEPHONE (OUTPATIENT)
Dept: BEHAVIORAL HEALTH | Facility: OTHER | Age: 44
End: 2019-03-11

## 2019-03-11 NOTE — TELEPHONE ENCOUNTER
Received referral for possible [facility] services from patient's primary.  Second attempt to reach patient, but was unsuccessful.        Candi Gallegos, [facility]  Social Work Care Coordinator  [facility]   [phone] or [phone]

## 2019-04-01 ENCOUNTER — TELEPHONE (OUTPATIENT)
Dept: FAMILY MEDICINE | Facility: OTHER | Age: 44
End: 2019-04-01

## 2019-04-01 DIAGNOSIS — R10.2 PELVIC PAIN IN FEMALE: ICD-10-CM

## 2019-04-01 NOTE — TELEPHONE ENCOUNTER
Patient scheduled 4/2/19 to register at 7:45. Left message to return call to clinic if appointment time does not work.

## 2019-04-01 NOTE — TELEPHONE ENCOUNTER
12:45 PM    Reason for Call: OVERBOOK    Patient is having the following symptoms: abdominal pain for ongoing months.    The patient is requesting an appointment for asap with provider.    Was an appointment offered for this call? No  If yes : Appointment type              Date    Preferred method for responding to this message: Telephone Call  What is your phone number ?[phone]  If we cannot reach you directly, may we leave a detailed response at the number you provided? Yes    Can this message wait until your PCP/provider returns, if unavailable today? Yes, provider is out    Ally Lee

## 2019-04-02 RX ORDER — CYCLOBENZAPRINE HCL 5 MG
TABLET ORAL
Qty: 60 TABLET | Refills: 1 | Status: SHIPPED | OUTPATIENT
Start: 2019-04-02 | End: 2020-02-21

## 2019-04-02 NOTE — TELEPHONE ENCOUNTER
Not on protocol.        cyclobenzaprine (FLEXERIL) 5 MG tablet      Last Written Prescription Date:  12/11/18  Last Fill Quantity: 60,   # refills: 1  Last Office Visit: 1/11/18  Future Office visit:       Routing refill request to provider for review/approval because:  Drug not on the [facility], P or [facility] refill protocol or controlled substance

## 2019-04-24 NOTE — TELEPHONE ENCOUNTER
Please schedule patient for date/time: Patient needs to go to ER/UC. Dr. Lewis was out of office today and yesterday.     Have patient go to ER/Urgent Care Center. Urgent Care hours are 9:30 am to 8 pm, open 7 days a week. Yes.    Provider will call patient.No.    Other:

## 2019-04-24 NOTE — TELEPHONE ENCOUNTER
Patient called - stated call yesterday regards to ongoing abdominal and a message was suppose to be sent to PCP and PCP nurse. No message seen in epic.     Please return patients call ASAP.   Stated abdominal pain is severe, unable to work and flexeril is not working.     Rosanne Jasmine LPN

## 2019-05-22 NOTE — PROGRESS NOTES
Subjective     Lashae Middleton is a 43 year old female who presents to clinic today for the following health issues:    HPI   Abdominal Pain      Duration: 9 months     Description (location/character/radiation): pelvic pain        Associated flank pain: None    Intensity:  4/10    Accompanying signs and symptoms:        Fever/Chills: no        Gas/Bloating: no        Nausea/vomitting: no        Diarrhea: no        Dysuria or Hematuria: no     History (previous similar pain/trauma/previous testing): yes, follow up    Precipitating or alleviating factors:       Pain worse with eating/BM/urination: no       Pain relieved by BM: no     Therapies tried and outcome: flexeril- no help    LMP:  not applicable    Pain is in lower pelvis area. Previously painful with intercourse, no longer sexually active. Occurs for a 1-3 days. Pain worse when up and active. Feels like menses cramps. Seems to be worsening. No periods s/p ablation. Pelvic US with small areas of possible endometrial remnants, but no fibrioids, etc. She was seen and evaluated by Ob/Gyn previously 8/31. Felt to be related to her IBS likely. Placed on bentyl and ppi. She had no improvement in symptoms. Trial of flexeril given pain with intercourse in Sept, was initially helpful, but no longer helping. Pain is currently not present. She has had colonoscopy and GI evaluation for bowels in the past from [place], diagnosed with IBS. These records have been requested on several occasions, but not available for my review.     Reviewed and updated as needed this visit by Provider  Tobacco  Allergies  Meds  Problems  Med Hx  Surg Hx  Fam Hx         Review of Systems   ROS COMP: Constitutional, HEENT, cardiovascular, pulmonary, gi and gu systems are negative, except as otherwise noted.      Objective    /72 (Patient Position: Sitting)   Pulse 96   Wt 92.5 kg (204 lb)   LMP  (LMP Unknown)   SpO2 98%   BMI 35.02 kg/m    Body mass index is 35.02  kg/m .  Physical Exam   GENERAL: healthy, alert and no distress  NECK: no adenopathy, no asymmetry, masses, or scars and thyroid normal to palpation  RESP: lungs clear to auscultation - no rales, rhonchi or wheezes  CV: regular rate and rhythm, normal S1 S2, no S3 or S4, no murmur, click or rub, no peripheral edema and peripheral pulses strong  ABDOMEN: soft, nontender, no hepatosplenomegaly, no masses and bowel sounds normal  MS: no gross musculoskeletal defects noted, no edema  PSYCH: mentation appears normal, affect normal/bright    Diagnostic Test Results:  Labs reviewed in Epic  No results found for this or any previous visit (from the past 24 hour(s)).        Assessment & Plan     1. Chronic bilateral lower abdominal pain  Discussed elavil vs neurontin. Trial of below. Follow up via phone in 1 month, clinic 3 months.   - gabapentin (NEURONTIN) 100 MG capsule; Take 1-3 capsules (100-300 mg) by mouth 3 times daily as needed (abd pain)  Dispense: 90 capsule; Refill: 3     Return in about 3 months (around 8/28/2019) for abd pain.    Melyssa Lewis MD  [facility]

## 2019-05-28 ENCOUNTER — OFFICE VISIT (OUTPATIENT)
Dept: FAMILY MEDICINE | Facility: OTHER | Age: 44
End: 2019-05-28
Attending: FAMILY MEDICINE
Payer: COMMERCIAL

## 2019-05-28 VITALS
SYSTOLIC BLOOD PRESSURE: 104 MMHG | OXYGEN SATURATION: 98 % | WEIGHT: 204 LBS | DIASTOLIC BLOOD PRESSURE: 72 MMHG | BODY MASS INDEX: 35.02 KG/M2 | HEART RATE: 96 BPM

## 2019-05-28 DIAGNOSIS — R10.31 CHRONIC BILATERAL LOWER ABDOMINAL PAIN: Primary | ICD-10-CM

## 2019-05-28 DIAGNOSIS — G89.29 CHRONIC BILATERAL LOWER ABDOMINAL PAIN: Primary | ICD-10-CM

## 2019-05-28 DIAGNOSIS — R10.32 CHRONIC BILATERAL LOWER ABDOMINAL PAIN: Primary | ICD-10-CM

## 2019-05-28 PROCEDURE — G0463 HOSPITAL OUTPT CLINIC VISIT: HCPCS

## 2019-05-28 PROCEDURE — 99213 OFFICE O/P EST LOW 20 MIN: CPT | Performed by: FAMILY MEDICINE

## 2019-05-28 RX ORDER — GABAPENTIN 100 MG/1
100-300 CAPSULE ORAL 3 TIMES DAILY PRN
Qty: 90 CAPSULE | Refills: 3 | Status: SHIPPED | OUTPATIENT
Start: 2019-05-28 | End: 2019-08-28

## 2019-05-28 ASSESSMENT — ANXIETY QUESTIONNAIRES
2. NOT BEING ABLE TO STOP OR CONTROL WORRYING: MORE THAN HALF THE DAYS
7. FEELING AFRAID AS IF SOMETHING AWFUL MIGHT HAPPEN: MORE THAN HALF THE DAYS
6. BECOMING EASILY ANNOYED OR IRRITABLE: MORE THAN HALF THE DAYS
3. WORRYING TOO MUCH ABOUT DIFFERENT THINGS: MORE THAN HALF THE DAYS
GAD7 TOTAL SCORE: 12
5. BEING SO RESTLESS THAT IT IS HARD TO SIT STILL: MORE THAN HALF THE DAYS
1. FEELING NERVOUS, ANXIOUS, OR ON EDGE: SEVERAL DAYS
4. TROUBLE RELAXING: SEVERAL DAYS

## 2019-05-28 ASSESSMENT — PAIN SCALES - GENERAL: PAINLEVEL: MODERATE PAIN (4)

## 2019-05-28 ASSESSMENT — PATIENT HEALTH QUESTIONNAIRE - PHQ9: SUM OF ALL RESPONSES TO PHQ QUESTIONS 1-9: 10

## 2019-05-28 NOTE — NURSING NOTE
"Chief Complaint   Patient presents with     Abdominal Pain       Initial /72 (Patient Position: Sitting)   Pulse 96   Wt 92.5 kg (204 lb)   LMP  (LMP Unknown)   SpO2 98%   BMI 35.02 kg/m   Estimated body mass index is 35.02 kg/m  as calculated from the following:    Height as of 1/11/19: 1.626 m (5' 4\").    Weight as of this encounter: 92.5 kg (204 lb).  Medication Reconciliation: complete    Lianna Hills LPN  "

## 2019-05-29 ASSESSMENT — ANXIETY QUESTIONNAIRES: GAD7 TOTAL SCORE: 12

## 2019-07-24 DIAGNOSIS — R10.13 ABDOMINAL PAIN, EPIGASTRIC: ICD-10-CM

## 2019-07-24 RX ORDER — PANTOPRAZOLE SODIUM 20 MG/1
TABLET, DELAYED RELEASE ORAL
Qty: 60 TABLET | Refills: 0 | Status: SHIPPED | OUTPATIENT
Start: 2019-07-24 | End: 2020-01-03

## 2019-07-24 NOTE — TELEPHONE ENCOUNTER
Protonix       Last Written Prescription Date:  Not currently on med list   Last Fill Quantity: 0,   # refills: 0  Last Office Visit: 5/28/19  Future Office visit:    Next 5 appointments (look out 90 days)    Aug 28, 2019 10:00 AM CDT  (Arrive by 9:45 AM)  SHORT with Melyssa Lewis MD  [facility] ) [address]  [place] [address]  [phone]

## 2019-08-19 NOTE — PROGRESS NOTES
Subjective     Lashae Middleton is a 43 year old female who presents to clinic today for the following health issues:    HPI   Gastrointestinal symptoms      Duration: Couple years    Description:           ABDOMINAL PAIN - pelvic region  .   Pain is described as shooting and radiates to bilateral side of lower back.    Intensity:  Right now 0/10 but has hit 10/10 recently    Accompanying signs and symptoms:  none    History  Previous {similar problem: YES  Previous evaluation:  none    Aggravating factors: none    Alleviating factors: nothing    Other Therapies tried: Multiple - on Gabapentin for pain - not effective.    Patient continues to have lower abd pain and cramping. Comes off and on. ? Cyclical/monthly. Can last several days to weeks. Radiates into low back. Dyspareunia and spasming in the past with this as well, however, no longer sexually active. Pain worsening with long periods of standing at work. Sitting can be painful. Gabapentin is not very effective at 100mg tid. Does make her slightly fatigued. Flexeril initially helpful in the past, no longer helpful. No vaginal bleeding. Seen in the past by Ob/Gyn. Felt this was more likely bowel related due to her history of IBS. Put on ppi and bentyl. This was not helpful. She does not have symptoms that radiate down the legs.      Asthma Follow-Up    Was ACT completed today?    Yes    ACT Total Scores 8/28/2019   ACT TOTAL SCORE (Goal Greater than or Equal to 20) 24   In the past 12 months, how many times did you visit the emergency room for your asthma without being admitted to the hospital? 0   In the past 12 months, how many times were you hospitalized overnight because of your asthma? 0       How many days per week do you miss taking your asthma controller medication?  I do not have an asthma controller medication    Please describe any recent triggers for your asthma: upper respiratory infections    Have you had any Emergency Room Visits, Urgent Care  "Visits, or Hospital Admissions since your last office visit?  No    Rare use of albuterol. Feels asthma is well controlled currently. No wheeze, shortness of breath.     Abnormal Mood Symptoms      Duration: On going    Description:  Depression: YES  Anxiety: YES  Panic attacks: YES     Accompanying signs and symptoms: see PHQ-9 and SIMONE scores    History (similar episodes/previous evaluation): Been diagnosed previously for both    Precipitating or alleviating factors: None    Therapies tried and outcome: Was treated in [place] for Depression and anxiety. Brother was recently diagnosed with Bipolar and wondering if she could have this too?    Does not want to be referred to Psychiatry at this time. She just would like some clarification of her diagnoses. She has been dx with anxiety and depression already. Has not had any clear keli episodes. Sleep is generally okay. No psychotic symptoms. Does feel her depression is currently an issue for her and makes functioning at work more difficult. She has stopped medications in the past due to weight gain. Would not be open to restarting those medications. Has tried topamax in the past and this was not tolerated. Wellbutrin may have made her gain weight, but also was quitting smoking at that time. Open to trial of this again. No SI, SIB.     Reviewed and updated as needed this visit by Provider  Tobacco  Allergies  Meds  Problems  Med Hx  Surg Hx  Fam Hx         Review of Systems   ROS COMP: Constitutional, HEENT, cardiovascular, pulmonary, gi and gu systems are negative, except as otherwise noted.      Objective    /68   Pulse 87   Temp 97.4  F (36.3  C) (Tympanic)   Resp 18   Ht 1.626 m (5' 4\")   Wt 89.1 kg (196 lb 6.4 oz)   SpO2 97%   BMI 33.71 kg/m    Body mass index is 33.71 kg/m .  Physical Exam   GENERAL: healthy, alert and no distress  RESP: lungs clear to auscultation - no rales, rhonchi or wheezes  CV: regular rate and rhythm, normal S1 S2, no " S3 or S4, no murmur, click or rub, no peripheral edema and peripheral pulses strong  ABDOMEN: soft, nontender, no hepatosplenomegaly, no masses and bowel sounds normal  MS: no gross musculoskeletal defects noted, no edema  NEURO: Normal strength and tone, mentation intact and speech normal  PSYCH: mentation appears normal, affect normal/bright    Diagnostic Test Results:  Labs reviewed in Epic  No results found for this or any previous visit (from the past 24 hour(s)).        Assessment & Plan     Pelvic pain in female-intermittent / Chronic bilateral lower abdominal pain  Track symptoms, ? Monthly which would indicate possibly uterine. US pelvis, unremarkable. CT pelvis unremarkable.  Continue gabapentin as partially effective, can increase dose. Will rule out radicular sx with MRI as well. She states she has done pelvic pt in the past and this was not helpful.   - MR Lumbar Spine w/o Contrast; Future  - gabapentin (NEURONTIN) 100 MG capsule; Take 1-3 capsules (100-300 mg) by mouth 4 times daily  Dispense: 90 capsule; Refill: 3    Depression, recurrent (H)  Trial of wellbutrin. [facility] for possible [facility] and counseline.   - buPROPion (WELLBUTRIN XL) 150 MG 24 hr tablet; Take 1 tablet (150 mg) by mouth every morning  Dispense: 30 tablet; Refill: 1  - [facility] REFERR[facility]    Hx of bee allergy  - EPINEPHrine (EPIPEN/ADRENACLICK/OR ANY BX GENERIC EQUIV) 0.3 MG/0.3ML injection 2-pack; Inject 0.3 mLs (0.3 mg) into the muscle as needed for anaphylaxis  Dispense: 0.6 mL; Refill: 0    Mild intermittent asthma without complication  Well controlled with prn albuterol.     Tobacco Cessation:   reports that she has been smoking cigarettes.  She has quit using smokeless tobacco.  Tobacco Cessation Action Plan: Pharmacotherapies : Zyban/Wellbutrin      Return for mood, chronic pelvic pain.    Melyssa Lewis MD  [facility] [place]

## 2019-08-28 ENCOUNTER — OFFICE VISIT (OUTPATIENT)
Dept: FAMILY MEDICINE | Facility: OTHER | Age: 44
End: 2019-08-28
Attending: FAMILY MEDICINE
Payer: COMMERCIAL

## 2019-08-28 VITALS
RESPIRATION RATE: 18 BRPM | WEIGHT: 196.4 LBS | BODY MASS INDEX: 33.53 KG/M2 | HEART RATE: 87 BPM | SYSTOLIC BLOOD PRESSURE: 102 MMHG | DIASTOLIC BLOOD PRESSURE: 68 MMHG | TEMPERATURE: 97.4 F | HEIGHT: 64 IN | OXYGEN SATURATION: 97 %

## 2019-08-28 DIAGNOSIS — Z91.030 HISTORY OF BEE STING ALLERGY: ICD-10-CM

## 2019-08-28 DIAGNOSIS — R10.31 CHRONIC BILATERAL LOWER ABDOMINAL PAIN: ICD-10-CM

## 2019-08-28 DIAGNOSIS — R10.2 PELVIC PAIN IN FEMALE: Primary | ICD-10-CM

## 2019-08-28 DIAGNOSIS — J45.20 MILD INTERMITTENT ASTHMA WITHOUT COMPLICATION: ICD-10-CM

## 2019-08-28 DIAGNOSIS — G89.29 CHRONIC BILATERAL LOWER ABDOMINAL PAIN: ICD-10-CM

## 2019-08-28 DIAGNOSIS — F33.9 DEPRESSION, RECURRENT (H): ICD-10-CM

## 2019-08-28 DIAGNOSIS — R10.32 CHRONIC BILATERAL LOWER ABDOMINAL PAIN: ICD-10-CM

## 2019-08-28 PROCEDURE — G0463 HOSPITAL OUTPT CLINIC VISIT: HCPCS

## 2019-08-28 PROCEDURE — 99214 OFFICE O/P EST MOD 30 MIN: CPT | Performed by: FAMILY MEDICINE

## 2019-08-28 RX ORDER — EPINEPHRINE 0.3 MG/.3ML
0.3 INJECTION SUBCUTANEOUS PRN
Qty: 0.6 ML | Refills: 0 | Status: SHIPPED | OUTPATIENT
Start: 2019-08-28

## 2019-08-28 RX ORDER — BUPROPION HYDROCHLORIDE 150 MG/1
150 TABLET ORAL EVERY MORNING
Qty: 30 TABLET | Refills: 1 | Status: SHIPPED | OUTPATIENT
Start: 2019-08-28 | End: 2019-12-03

## 2019-08-28 RX ORDER — GABAPENTIN 100 MG/1
100-300 CAPSULE ORAL 4 TIMES DAILY
Qty: 90 CAPSULE | Refills: 3 | Status: SHIPPED | OUTPATIENT
Start: 2019-08-28 | End: 2019-12-03

## 2019-08-28 ASSESSMENT — ASTHMA QUESTIONNAIRES
QUESTION_2 LAST FOUR WEEKS HOW OFTEN HAVE YOU HAD SHORTNESS OF BREATH: NOT AT ALL
QUESTION_3 LAST FOUR WEEKS HOW OFTEN DID YOUR ASTHMA SYMPTOMS (WHEEZING, COUGHING, SHORTNESS OF BREATH, CHEST TIGHTNESS OR PAIN) WAKE YOU UP AT NIGHT OR EARLIER THAN USUAL IN THE MORNING: NOT AT ALL
QUESTION_1 LAST FOUR WEEKS HOW MUCH OF THE TIME DID YOUR ASTHMA KEEP YOU FROM GETTING AS MUCH DONE AT WORK, SCHOOL OR AT HOME: NONE OF THE TIME
ACUTE_EXACERBATION_TODAY: NO
QUESTION_4 LAST FOUR WEEKS HOW OFTEN HAVE YOU USED YOUR RESCUE INHALER OR NEBULIZER MEDICATION (SUCH AS ALBUTEROL): NOT AT ALL
QUESTION_5 LAST FOUR WEEKS HOW WOULD YOU RATE YOUR ASTHMA CONTROL: WELL CONTROLLED
ACT_TOTALSCORE: 24

## 2019-08-28 ASSESSMENT — PATIENT HEALTH QUESTIONNAIRE - PHQ9: SUM OF ALL RESPONSES TO PHQ QUESTIONS 1-9: 17

## 2019-08-28 ASSESSMENT — MIFFLIN-ST. JEOR: SCORE: 1520.86

## 2019-08-28 ASSESSMENT — PAIN SCALES - GENERAL: PAINLEVEL: NO PAIN (0)

## 2019-08-28 NOTE — NURSING NOTE
"Chief Complaint   Patient presents with     Abdominal Pain       Initial /68   Pulse 87   Temp 97.4  F (36.3  C) (Tympanic)   Resp 18   Ht 1.626 m (5' 4\")   Wt 89.1 kg (196 lb 6.4 oz)   SpO2 97%   BMI 33.71 kg/m   Estimated body mass index is 33.71 kg/m  as calculated from the following:    Height as of this encounter: 1.626 m (5' 4\").    Weight as of this encounter: 89.1 kg (196 lb 6.4 oz).  Medication Reconciliation: complete  "

## 2019-08-29 ASSESSMENT — ASTHMA QUESTIONNAIRES: ACT_TOTALSCORE: 24

## 2019-09-05 ENCOUNTER — HOSPITAL ENCOUNTER (OUTPATIENT)
Dept: MRI IMAGING | Facility: HOSPITAL | Age: 44
Discharge: HOME | End: 2019-09-05
Attending: FAMILY MEDICINE | Admitting: FAMILY MEDICINE
Payer: COMMERCIAL

## 2019-09-05 DIAGNOSIS — R10.2 PELVIC PAIN IN FEMALE: ICD-10-CM

## 2019-09-05 PROCEDURE — 72148 MRI LUMBAR SPINE W/O DYE: CPT | Mod: TC

## 2019-09-20 ENCOUNTER — TELEPHONE (OUTPATIENT)
Dept: BEHAVIORAL HEALTH | Facility: OTHER | Age: 44
End: 2019-09-20

## 2019-09-20 NOTE — TELEPHONE ENCOUNTER
JUVENAL HINKLE received referral from Dr. Lewis. JUVENAL HINKLE reached out to patient and explained [facility]. Patient states she may be interested, patient looking for diagnostic assessment and will meet with JUVENAL HINKLE that day to go over [facility]. Patient scheduled 9/25/19.  Presenting issue: unsure of Mental Health Diagnosis       MACKENZIE Galindo  Social Work Care Coordinator  [facility]   [phone] or [phone]

## 2019-09-24 ENCOUNTER — TELEPHONE (OUTPATIENT)
Dept: BEHAVIORAL HEALTH | Facility: OTHER | Age: 44
End: 2019-09-24

## 2019-09-24 DIAGNOSIS — R69 DIAGNOSIS DEFERRED: Primary | ICD-10-CM

## 2019-09-24 NOTE — TELEPHONE ENCOUNTER
JUVENAL HINKLE received phone call from patient, patient reports she caught the same sickness her daughter has and is unable to make it to her appointment tomorrow. Patient rescheduled for 10/23/19 at 10:15 with Karyn Montoya, patient will reach out to this writer if appointment doesn't work with her work schedule.       MACKENZIE Galindo  Social Work Care Coordinator  [facility]   [phone] or [phone]

## 2019-09-25 NOTE — PROGRESS NOTES
Subjective     Lashae Middleton is a 44 year old female who presents to clinic today for the following health issues:    HPI     Musculoskeletal problem/pain  Discuss MRI Results      Duration: FOLLOW UP    Description  Location: Pelvic pain - lower abdominal. Bilateral low back pain    Intensity:  mild, moderate    Accompanying signs and symptoms: none    History  Previous similar problem: YES  Previous evaluation:  YES    Precipitating or alleviating factors:  Trauma or overuse: no   Aggravating factors include: overuse    Therapies tried and outcome: physical therapy and Gabapentin    Abd pain has been under fair control lately. Notes that the pain worsens with increase in stressors. She is using gabapentin 200mg tid. Not sure if this is helpful or not, but would like ot continue for now.     Wellbutrin has been tolerated well. She would like to continue, however, does not know if her mood is any better. She states she is currently having some social issues that are to blame for her mood and feel medication is unlikely to be that helpful. She has not been able to cut back on smoking. Wants to try Chantix again, if possible. She is aware of possible mood effects, but did not experience this last time she tried.     She would like to review her MRI results today. Does have some low back pain that radiates to the leg, but this is very chronic and intermittent. Does not correlate with MRI findings of disk bulge on the left side unfortunately. She has done PT for her back before that flared her symptoms. She currently has home stretching exercises that she uses when it flares and this is helpful..     Reviewed and updated as needed this visit by Provider  Tobacco  Allergies  Meds  Problems  Med Hx  Surg Hx  Fam Hx         Review of Systems   ROS COMP: Constitutional, HEENT, cardiovascular, pulmonary, gi and gu systems are negative, except as otherwise noted.      Objective    /64   Pulse 82   Resp 18    "Ht 1.626 m (5' 4\")   Wt 87.1 kg (192 lb)   SpO2 99%   BMI 32.96 kg/m    Body mass index is 32.96 kg/m .  Physical Exam   GENERAL: healthy, alert and no distress  NECK: no adenopathy, no asymmetry, masses, or scars and thyroid normal to palpation  RESP: lungs clear to auscultation - no rales, rhonchi or wheezes  CV: regular rate and rhythm, normal S1 S2, no S3 or S4, no murmur, click or rub, no peripheral edema and peripheral pulses strong  ABDOMEN: tenderness RLQ and bowel sounds normal  MS: RLE exam shows normal strength and muscle mass and LLE exam shows normal strength and muscle mass  NEURO: Normal strength and tone, mentation intact and speech normal  PSYCH: mentation appears normal, affect normal/bright    Diagnostic Test Results:  Labs reviewed in Epic  Results for orders placed or performed during the hospital encounter of 09/05/19   MR Lumbar Spine w/o Contrast    Narrative    MR LUMBAR SPINE W/O CONTRAST    HISTORY: 43 years Female Back pain, chronic, mechanical; chronic  pelvic pain; Pelvic pain in female. Patient reports right and left  back pain of one and half years duration.    COMPARISONS:None    TECHNIQUE: Sagittal T1, sagittal T2, sagittal STIR, axial proton  density, axial T2 imaging of the lumbar spine was performed.    FINDINGS: Alignment of the lumbar spine is normal   Bone marrow signal  of the lumbar vertebral bodies is uniform.Signal intensity and caliber  of the lower thoracic spinal cord is normal. The conus medullaris is  at the T12-L1 level.    L5-S1: There is normal disc height. There is normal disc signal. The  central canal and neuroforamen are patent.    L4-L5: There is mild loss of disc height and signal. A mild  broad-based disc bulge is present. This indents the ventral thecal sac  to the left of midline, narrows the left lateral recess and imparts  mild mass effect upon the left L5 nerve root. The neuroforamen are  widely patent.     L3-L4: There is normal disc space height " and signal. The central  spinal canal and neural foramen are patent.    L2-L3: There is normal disc space height and signal. The central  spinal canal and neural foramen are patent.    L1-L2: There is normal disc space height and signal. The central  spinal canal and neural foramen are patent.      Impression    IMPRESSION: Degenerative disc space narrowing of mild severity and a  left paracentral broad-based disc bulge at L4-L5. There is narrowing  of the left lateral recess and mild mass effect upon the left L5 nerve  root.    No significant degenerative changes of the lumbar spine are otherwise  present.    GRUPO SAWYER MD           Assessment & Plan     Tobacco abuse   reports that she has been smoking cigarettes. She has quit using smokeless tobacco.  - varenicline (CHANTIX CECILE) 0.5 MG X 11 & 1 MG X 42 tablet; Take 0.5 mg tab daily for 3 days, THEN 0.5 mg tab twice daily for 4 days, THEN 1 mg twice daily.  Dispense: 53 tablet; Refill: 0    Pelvic pain in female  Stable to improved after increase in gabpentin and wellbutrin. Monitor for now. Recommended pelvic PT as she has had pain with intercourse as well. She will consider, but is unable due to work schedule.     Depression, recurrent (H)  Stable. Has Diagnostic Assessment with [facility] coming up, but has yet to re schedule due to work schedule. Encouraged this. Continue wellbutrin for now as pelvic pain somewhat improved, weight is down, working on tobacco cessation, no side effects.     Chronic right-sided low back pain with right-sided sciatica  Intermittent, not correlated with left sided MRI findings. She desires no further action as pain is chronic and rare and she manages at home to resolve flares.     Return in about 3 months (around 1/2/2020).    Melyssa Lewis MD  [facility] [place]

## 2019-10-02 ENCOUNTER — OFFICE VISIT (OUTPATIENT)
Dept: FAMILY MEDICINE | Facility: OTHER | Age: 44
End: 2019-10-02
Attending: FAMILY MEDICINE
Payer: COMMERCIAL

## 2019-10-02 VITALS
HEIGHT: 64 IN | WEIGHT: 192 LBS | OXYGEN SATURATION: 99 % | RESPIRATION RATE: 18 BRPM | HEART RATE: 82 BPM | BODY MASS INDEX: 32.78 KG/M2 | SYSTOLIC BLOOD PRESSURE: 102 MMHG | DIASTOLIC BLOOD PRESSURE: 64 MMHG

## 2019-10-02 DIAGNOSIS — M54.41 CHRONIC RIGHT-SIDED LOW BACK PAIN WITH RIGHT-SIDED SCIATICA: ICD-10-CM

## 2019-10-02 DIAGNOSIS — R10.2 PELVIC PAIN IN FEMALE: ICD-10-CM

## 2019-10-02 DIAGNOSIS — G89.29 CHRONIC RIGHT-SIDED LOW BACK PAIN WITH RIGHT-SIDED SCIATICA: ICD-10-CM

## 2019-10-02 DIAGNOSIS — F33.9 DEPRESSION, RECURRENT (H): Primary | ICD-10-CM

## 2019-10-02 DIAGNOSIS — Z72.0 TOBACCO ABUSE: ICD-10-CM

## 2019-10-02 PROCEDURE — 99214 OFFICE O/P EST MOD 30 MIN: CPT | Performed by: FAMILY MEDICINE

## 2019-10-02 PROCEDURE — G0463 HOSPITAL OUTPT CLINIC VISIT: HCPCS

## 2019-10-02 ASSESSMENT — ANXIETY QUESTIONNAIRES
3. WORRYING TOO MUCH ABOUT DIFFERENT THINGS: SEVERAL DAYS
GAD7 TOTAL SCORE: 10
4. TROUBLE RELAXING: MORE THAN HALF THE DAYS
5. BEING SO RESTLESS THAT IT IS HARD TO SIT STILL: MORE THAN HALF THE DAYS
1. FEELING NERVOUS, ANXIOUS, OR ON EDGE: SEVERAL DAYS
7. FEELING AFRAID AS IF SOMETHING AWFUL MIGHT HAPPEN: NOT AT ALL
6. BECOMING EASILY ANNOYED OR IRRITABLE: NEARLY EVERY DAY
IF YOU CHECKED OFF ANY PROBLEMS ON THIS QUESTIONNAIRE, HOW DIFFICULT HAVE THESE PROBLEMS MADE IT FOR YOU TO DO YOUR WORK, TAKE CARE OF THINGS AT HOME, OR GET ALONG WITH OTHER PEOPLE: SOMEWHAT DIFFICULT
2. NOT BEING ABLE TO STOP OR CONTROL WORRYING: SEVERAL DAYS

## 2019-10-02 ASSESSMENT — MIFFLIN-ST. JEOR: SCORE: 1495.91

## 2019-10-02 ASSESSMENT — PAIN SCALES - GENERAL: PAINLEVEL: MODERATE PAIN (5)

## 2019-10-02 ASSESSMENT — PATIENT HEALTH QUESTIONNAIRE - PHQ9: SUM OF ALL RESPONSES TO PHQ QUESTIONS 1-9: 6

## 2019-10-03 ASSESSMENT — ANXIETY QUESTIONNAIRES: GAD7 TOTAL SCORE: 10

## 2019-12-03 ENCOUNTER — TELEPHONE (OUTPATIENT)
Dept: FAMILY MEDICINE | Facility: OTHER | Age: 44
End: 2019-12-03

## 2019-12-03 ENCOUNTER — OFFICE VISIT (OUTPATIENT)
Dept: FAMILY MEDICINE | Facility: OTHER | Age: 44
End: 2019-12-03
Attending: FAMILY MEDICINE
Payer: COMMERCIAL

## 2019-12-03 ENCOUNTER — ANCILLARY PROCEDURE (OUTPATIENT)
Dept: GENERAL RADIOLOGY | Facility: OTHER | Age: 44
End: 2019-12-03
Attending: FAMILY MEDICINE
Payer: COMMERCIAL

## 2019-12-03 VITALS
HEART RATE: 82 BPM | DIASTOLIC BLOOD PRESSURE: 70 MMHG | WEIGHT: 192 LBS | BODY MASS INDEX: 32.78 KG/M2 | OXYGEN SATURATION: 97 % | SYSTOLIC BLOOD PRESSURE: 110 MMHG | HEIGHT: 64 IN

## 2019-12-03 DIAGNOSIS — M25.531 RIGHT WRIST PAIN: ICD-10-CM

## 2019-12-03 DIAGNOSIS — M25.531 RIGHT WRIST PAIN: Primary | ICD-10-CM

## 2019-12-03 DIAGNOSIS — S60.211A CONTUSION OF RIGHT WRIST, INITIAL ENCOUNTER: ICD-10-CM

## 2019-12-03 DIAGNOSIS — G43.909 MIGRAINE SYNDROME: ICD-10-CM

## 2019-12-03 PROCEDURE — 73110 X-RAY EXAM OF WRIST: CPT | Mod: TC,RT

## 2019-12-03 PROCEDURE — 99214 OFFICE O/P EST MOD 30 MIN: CPT | Performed by: FAMILY MEDICINE

## 2019-12-03 PROCEDURE — G0463 HOSPITAL OUTPT CLINIC VISIT: HCPCS

## 2019-12-03 RX ORDER — ONDANSETRON 4 MG/1
4 TABLET, ORALLY DISINTEGRATING ORAL EVERY 8 HOURS PRN
Qty: 30 TABLET | Refills: 0 | Status: SHIPPED | OUTPATIENT
Start: 2019-12-03 | End: 2020-02-21

## 2019-12-03 RX ORDER — RIZATRIPTAN BENZOATE 5 MG/1
5-10 TABLET ORAL
Qty: 30 TABLET | Refills: 0 | Status: SHIPPED | OUTPATIENT
Start: 2019-12-03 | End: 2019-12-24

## 2019-12-03 ASSESSMENT — PAIN SCALES - GENERAL: PAINLEVEL: EXTREME PAIN (8)

## 2019-12-03 ASSESSMENT — MIFFLIN-ST. JEOR: SCORE: 1495.91

## 2019-12-03 NOTE — NURSING NOTE
"Chief Complaint   Patient presents with     Musculoskeletal Problem       Initial /70 (Patient Position: Sitting)   Pulse 82   Ht 1.626 m (5' 4\")   Wt 87.1 kg (192 lb)   SpO2 97%   BMI 32.96 kg/m   Estimated body mass index is 32.96 kg/m  as calculated from the following:    Height as of this encounter: 1.626 m (5' 4\").    Weight as of this encounter: 87.1 kg (192 lb).  Medication Reconciliation: complete  Lianna Hills LPN  "

## 2019-12-03 NOTE — PROGRESS NOTES
"Subjective     Lashae Middleton is a 44 year old female who presents to clinic today for the following health issues:    HPI   Musculoskeletal problem/pain      Duration: 1 day     Description  Location: right wrist     Intensity:  moderate    Accompanying signs and symptoms: radiation of pain to the finger, numbness, tingling, warmth and swelling    History  Previous similar problem: no   Previous evaluation:  x-ray done today    Precipitating or alleviating factors:  Trauma or overuse: YES- fell on ice   Aggravating factors include: turning motions    Therapies tried and outcome: flexeril no help    Migraine     Since your last clinic visit, how have your headaches changed?  Worsened    How often are you getting headaches or migraines? Weekly      Are you able to do normal daily activities when you have a migraine? No    Are you taking rescue/relief medications? (Select all that apply) ibuprofen (Advil, Motrin)    How helpful is your rescue/relief medication?  I get some relief    Are you taking any medications to prevent migraines? (Select all that apply)  No    In the past 4 weeks, how often have you gone to urgent care or the emergency room because of your headaches?  0    History of migraines that have up until the last several months. Started after she started a new job. Increased stressors. Similar in character to previous migraines. Photophobia, nausea. No neurologic sx, aura. No numbness, tinging, weakess, hearing, vision changes.     Reviewed and updated as needed this visit by Provider  Tobacco  Allergies  Meds  Problems  Med Hx  Surg Hx  Fam Hx         Review of Systems   ROS COMP: Constitutional, HEENT, cardiovascular, pulmonary, gi and gu systems are negative, except as otherwise noted.      Objective    /70 (Patient Position: Sitting)   Pulse 82   Ht 1.626 m (5' 4\")   Wt 87.1 kg (192 lb)   SpO2 97%   BMI 32.96 kg/m    Body mass index is 32.96 kg/m .  Physical Exam   GENERAL: " healthy, alert and no distress  NECK: no adenopathy, no asymmetry, masses, or scars and thyroid normal to palpation  RESP: lungs clear to auscultation - no rales, rhonchi or wheezes  CV: regular rates and rhythm, normal S1 S2, no S3 or S4 and no murmur, click or rub  ABDOMEN: soft, nontender, no hepatosplenomegaly, no masses and bowel sounds normal  MSK: Right wrist with full ROM, strength, TTP over the ulnar aspect of the wrist, no brusing, finger ROM wnl.   NEURO: Normal strength and tone, mentation intact and speech normal    Diagnostic Test Results:  Labs reviewed in Epic  No results found for any visits on 12/03/19.        Assessment & Plan     Right wrist pain / Contusion of right wrist, initial encounter  XR negative. Brace for protection at work ([employer]). NSAID, ice, rest.   - XR Wrist Right G/E 3 Views; Future    Migraine syndrome  Trial of maxalt prn for migraine sx. Zofran prn for nausea. Return for follow up of migraines.   - ondansetron (ZOFRAN-ODT) 4 MG ODT tab; Take 1 tablet (4 mg) by mouth every 8 hours as needed for nausea  Dispense: 30 tablet; Refill: 0  - rizatriptan (MAXALT) 5 MG tablet; Take 1-2 tablets (5-10 mg) by mouth at onset of headache for migraine May repeat in 2 hours. Max 6 tablets/24 hours.  Dispense: 30 tablet; Refill: 0     Return in about 2 months (around 2/3/2020), or if symptoms worsen or fail to improve, for migraine.    Melyssa Lewis MD  [facility] [place]

## 2019-12-03 NOTE — TELEPHONE ENCOUNTER
1:04 PM    Reason for Call: OVERBOOK    Patient is having the following symptoms: fell on ice and landed on knee and right hand wrist is now swollen  for 1 days.    The patient is requesting an appointment for 12/03/19  with . her daughter has one on 4:15 pm arrival time and she would like to get seen at the same time     Was an appointment offered for this call? No  If yes : Appointment type              Date    Preferred method for responding to this message: Telephone Call  What is your phone number ? [phone]    If we cannot reach you directly, may we leave a detailed response at the number you provided? Yes    Can this message wait until your PCP/provider returns, if unavailable today? Not applicable, pcp is in     Triny Jaramillo

## 2019-12-08 ENCOUNTER — MYC MEDICAL ADVICE (OUTPATIENT)
Dept: FAMILY MEDICINE | Facility: OTHER | Age: 44
End: 2019-12-08

## 2019-12-08 DIAGNOSIS — G89.29 CHRONIC BILATERAL LOW BACK PAIN WITHOUT SCIATICA: Primary | ICD-10-CM

## 2019-12-08 DIAGNOSIS — M54.50 CHRONIC BILATERAL LOW BACK PAIN WITHOUT SCIATICA: Primary | ICD-10-CM

## 2019-12-09 ENCOUNTER — MYC MEDICAL ADVICE (OUTPATIENT)
Dept: FAMILY MEDICINE | Facility: OTHER | Age: 44
End: 2019-12-09

## 2019-12-23 DIAGNOSIS — G43.909 MIGRAINE SYNDROME: ICD-10-CM

## 2019-12-23 NOTE — TELEPHONE ENCOUNTER
Rizatriptan      Last Written Prescription Date:  12/03/19  Last Fill Quantity: 30,   # refills: 0  Last Office Visit: 12/03/19  Future Office visit:    Next 5 appointments (look out 90 days)    Jan 03, 2020  1:00 PM CST  (Arrive by 12:45 PM)  SHORT with Melyssa Lewis MD  [facility] [place] ([facility] [place] ) [address]  [place] [address]  [phone]

## 2019-12-24 RX ORDER — RIZATRIPTAN BENZOATE 5 MG/1
5-10 TABLET ORAL
Qty: 30 TABLET | Refills: 0 | Status: SHIPPED | OUTPATIENT
Start: 2019-12-24 | End: 2020-01-03

## 2019-12-24 NOTE — TELEPHONE ENCOUNTER
Protocol failed due to   Serotonin Agonist request needs review.      Please review patient's record. If patient has had 8 or more treatments in the past month, please forward to provider.     Please advise. Thank you!

## 2019-12-30 NOTE — PROGRESS NOTES
Subjective     Lashae Middleton is a 44 year old female who presents to clinic today for the following health issues:    HPI     Migraine     Since your last clinic visit, how have your headaches changed?  No change    How often are you getting headaches or migraines? Couple days per week     Are you able to do normal daily activities when you have a migraine? Yes    Are you taking rescue/relief medications? (Select all that apply) Maxalt    How helpful is your rescue/relief medication?  I get total relief    Are you taking any medications to prevent migraines? (Select all that apply)  No    In the past 4 weeks, how often have you gone to urgent care or the emergency room because of your headaches?  0     Migraines have been stable, a couple times a week. The maxalt is completely effective if she takes at the first sign. Not as effective later in course. Regardless, would like to continue.       ED/UC Followup:    Facility:  [facility] [place]  Date of visit: 1/1/20  Reason for visit: vaginal bleeding  Current Status: vaginal bleeding had ultrasound this morning having left lower abdominal pain    Pt seen in the ED for vaginal bleeding. This is new for her since her endometrial ablation. Painless until today when she was having her US. Then noted some LLQ pain. She is very concerned about endometrial cancer, her mother had this. ED pelvic exam confirmed blood from Cervical Os.        RESPIRATORY SYMPTOMS      Duration: 12/31/19    Description  nasal congestion, rhinorrhea, facial pain/pressure, cough, fever, chills, headache, myalgias and diarrhea    Severity: moderate    Accompanying signs and symptoms: None    History (predisposing factors):  tobacco abuse    Precipitating or alleviating factors: None    Therapies tried and outcome:  none    Pt ill with URI currently. Going on for several days. No fever. Cough, sinus pain/pressure, myalgia. Multiple ill contacts at work. Would like note for work for today and tomorrow if  possible.     Reviewed and updated as needed this visit by Provider         Review of Systems   ROS COMP: Constitutional, HEENT, cardiovascular, pulmonary, gi and gu systems are negative, except as otherwise noted.      Objective    There were no vitals taken for this visit.  There is no height or weight on file to calculate BMI.  Physical Exam   GENERAL: healthy, alert and no distress  EYES: Eyes grossly normal to inspection, PERRL and conjunctivae and sclerae normal  HENT: normal cephalic/atraumatic, ear canals and TM's bulging, nose and mouth without ulcers or lesions, oropharynx clear and oral mucous membranes moist  RESP: lungs clear to auscultation - no rales, rhonchi or wheezes  CV: regular rate and rhythm, normal S1 S2, no S3 or S4, no murmur, click or rub, no peripheral edema and peripheral pulses strong  ABDOMEN: tenderness LLQ and bowel sounds normal  MS: no gross musculoskeletal defects noted, no edema  PSYCH: mentation appears normal, affect normal/bright    Diagnostic Test Results:  Labs reviewed in Epic  No results found for any visits on 01/03/20.        Assessment & Plan     1. Migraine syndrome  Continue maxalt. Due to acute URI and abd pain will not start preventative treatment at this time. Return for further discussion.   - rizatriptan (MAXALT) 5 MG tablet; Take 1-2 tablets (5-10 mg) by mouth at onset of headache for migraine May repeat in 2 hours. Max 6 tablets/24 hours.  Dispense: 30 tablet; Refill: 0    2. Abdominal pain, epigastric  Refilled.   - pantoprazole (PROTONIX) 20 MG EC tablet; TAKE 1 TABLET BY MOUTH ONCE DAILY 30-60  MINUTES  BEFORE  A  MEAL  Dispense: 60 tablet; Refill: 0    3. S/P endometrial ablation / 4. Vaginal bleeding  US today reviewed. Some adenomyosis, myometrial cyst. Pt has had ongoing pelvic pain, previously seen by Ob/Gyn and felt to be related to IBS. Recommend re referral to Ob.Gyn for further discussion and management.   - OB/GYN REFERRAL    5. Viral Upper  Respiratory Infection  Supportive cares discussed. Work note provided.     Return in about 3 months (around 4/3/2020) for migraines.    Melyssa Lewis MD  [facility] [place]

## 2020-01-01 ENCOUNTER — MYC MEDICAL ADVICE (OUTPATIENT)
Dept: FAMILY MEDICINE | Facility: OTHER | Age: 45
End: 2020-01-01

## 2020-01-01 ENCOUNTER — HOSPITAL ENCOUNTER (EMERGENCY)
Facility: HOSPITAL | Age: 45
Discharge: HOME | End: 2020-01-01
Attending: FAMILY MEDICINE | Admitting: FAMILY MEDICINE
Payer: COMMERCIAL

## 2020-01-01 VITALS
TEMPERATURE: 98.2 F | OXYGEN SATURATION: 97 % | DIASTOLIC BLOOD PRESSURE: 83 MMHG | SYSTOLIC BLOOD PRESSURE: 122 MMHG | HEART RATE: 66 BPM | RESPIRATION RATE: 14 BRPM

## 2020-01-01 DIAGNOSIS — N93.9 VAGINAL BLEEDING: Primary | ICD-10-CM

## 2020-01-01 DIAGNOSIS — N93.9 VAGINAL BLEEDING: ICD-10-CM

## 2020-01-01 LAB
ALBUMIN SERPL-MCNC: 3.3 G/DL (ref 3.4–5)
ALP SERPL-CCNC: 89 U/L (ref 40–150)
ALT SERPL W P-5'-P-CCNC: 62 U/L (ref 0–50)
ANION GAP SERPL CALCULATED.3IONS-SCNC: 5 MMOL/L (ref 3–14)
AST SERPL W P-5'-P-CCNC: 35 U/L (ref 0–45)
BASOPHILS # BLD AUTO: 0.1 10E9/L (ref 0–0.2)
BASOPHILS NFR BLD AUTO: 1.6 %
BILIRUB SERPL-MCNC: 0.5 MG/DL (ref 0.2–1.3)
BUN SERPL-MCNC: 11 MG/DL (ref 7–30)
C TRACH DNA SPEC QL PROBE+SIG AMP: NOT DETECTED
CALCIUM SERPL-MCNC: 8.2 MG/DL (ref 8.5–10.1)
CHLORIDE SERPL-SCNC: 114 MMOL/L (ref 94–109)
CO2 SERPL-SCNC: 24 MMOL/L (ref 20–32)
CREAT SERPL-MCNC: 0.72 MG/DL (ref 0.52–1.04)
DIFFERENTIAL METHOD BLD: NORMAL
EOSINOPHIL # BLD AUTO: 0.1 10E9/L (ref 0–0.7)
EOSINOPHIL NFR BLD AUTO: 3.2 %
ERYTHROCYTE [DISTWIDTH] IN BLOOD BY AUTOMATED COUNT: 13.3 % (ref 10–15)
GFR SERPL CREATININE-BSD FRML MDRD: >90 ML/MIN/{1.73_M2}
GLUCOSE SERPL-MCNC: 100 MG/DL (ref 70–99)
HCG UR QL: NEGATIVE
HCT VFR BLD AUTO: 41.8 % (ref 35–47)
HGB BLD-MCNC: 14.1 G/DL (ref 11.7–15.7)
IMM GRANULOCYTES # BLD: 0 10E9/L (ref 0–0.4)
IMM GRANULOCYTES NFR BLD: 0.2 %
LYMPHOCYTES # BLD AUTO: 1.1 10E9/L (ref 0.8–5.3)
LYMPHOCYTES NFR BLD AUTO: 25.4 %
MCH RBC QN AUTO: 33 PG (ref 26.5–33)
MCHC RBC AUTO-ENTMCNC: 33.7 G/DL (ref 31.5–36.5)
MCV RBC AUTO: 98 FL (ref 78–100)
MONOCYTES # BLD AUTO: 0.5 10E9/L (ref 0–1.3)
MONOCYTES NFR BLD AUTO: 10.9 %
N GONORRHOEA DNA SPEC QL PROBE+SIG AMP: NOT DETECTED
NEUTROPHILS # BLD AUTO: 2.5 10E9/L (ref 1.6–8.3)
NEUTROPHILS NFR BLD AUTO: 58.7 %
NRBC # BLD AUTO: 0 10*3/UL
NRBC BLD AUTO-RTO: 0 /100
PLATELET # BLD AUTO: 219 10E9/L (ref 150–450)
POTASSIUM SERPL-SCNC: 3.9 MMOL/L (ref 3.4–5.3)
PROT SERPL-MCNC: 6.9 G/DL (ref 6.8–8.8)
RBC # BLD AUTO: 4.27 10E12/L (ref 3.8–5.2)
SODIUM SERPL-SCNC: 143 MMOL/L (ref 133–144)
SPECIMEN SOURCE: NORMAL
WBC # BLD AUTO: 4.3 10E9/L (ref 4–11)

## 2020-01-01 PROCEDURE — 36415 COLL VENOUS BLD VENIPUNCTURE: CPT | Performed by: FAMILY MEDICINE

## 2020-01-01 PROCEDURE — 25000132 ZZH RX MED GY IP 250 OP 250 PS 637: Performed by: FAMILY MEDICINE

## 2020-01-01 PROCEDURE — 87591 N.GONORRHOEAE DNA AMP PROB: CPT | Performed by: FAMILY MEDICINE

## 2020-01-01 PROCEDURE — 80053 COMPREHEN METABOLIC PANEL: CPT | Performed by: FAMILY MEDICINE

## 2020-01-01 PROCEDURE — 96372 THER/PROPH/DIAG INJ SC/IM: CPT

## 2020-01-01 PROCEDURE — 87070 CULTURE OTHR SPECIMN AEROBIC: CPT | Performed by: FAMILY MEDICINE

## 2020-01-01 PROCEDURE — 87491 CHLMYD TRACH DNA AMP PROBE: CPT | Performed by: FAMILY MEDICINE

## 2020-01-01 PROCEDURE — 99284 EMERGENCY DEPT VISIT MOD MDM: CPT | Mod: 25

## 2020-01-01 PROCEDURE — 85025 COMPLETE CBC W/AUTO DIFF WBC: CPT | Performed by: FAMILY MEDICINE

## 2020-01-01 PROCEDURE — 25000128 H RX IP 250 OP 636: Performed by: FAMILY MEDICINE

## 2020-01-01 PROCEDURE — 81025 URINE PREGNANCY TEST: CPT | Performed by: FAMILY MEDICINE

## 2020-01-01 PROCEDURE — 99284 EMERGENCY DEPT VISIT MOD MDM: CPT | Mod: Z6 | Performed by: FAMILY MEDICINE

## 2020-01-01 RX ORDER — CEFTRIAXONE SODIUM 1 G
250 VIAL (EA) INJECTION ONCE
Status: COMPLETED | OUTPATIENT
Start: 2020-01-01 | End: 2020-01-01

## 2020-01-01 RX ORDER — AZITHROMYCIN 250 MG/1
1000 TABLET, FILM COATED ORAL ONCE
Status: COMPLETED | OUTPATIENT
Start: 2020-01-01 | End: 2020-01-01

## 2020-01-01 RX ADMIN — AZITHROMYCIN 1000 MG: 250 TABLET, FILM COATED ORAL at 10:17

## 2020-01-01 RX ADMIN — CEFTRIAXONE 250 MG: 1 INJECTION, POWDER, FOR SOLUTION INTRAMUSCULAR; INTRAVENOUS at 10:16

## 2020-01-01 ASSESSMENT — ENCOUNTER SYMPTOMS
BLOOD IN STOOL: 0
WEAKNESS: 0
DIZZINESS: 0
EYE PAIN: 0
CONFUSION: 0
FREQUENCY: 0
NAUSEA: 0
CHILLS: 0
VOICE CHANGE: 0
HEADACHES: 0
NECK PAIN: 0
CONSTIPATION: 0
DIFFICULTY URINATING: 0
FEVER: 0
PHOTOPHOBIA: 0
DYSURIA: 0
BACK PAIN: 1
ABDOMINAL PAIN: 0
VOMITING: 0
SORE THROAT: 0
SHORTNESS OF BREATH: 0
RECTAL PAIN: 0
CHEST TIGHTNESS: 1
COUGH: 0

## 2020-01-01 NOTE — LETTER
January 1, 2020      To Whom It May Concern:      Lashae Middleton was seen in our Emergency Department today, 01/01/20 and please excuse her from work today.  I expect her condition to improve over the next 2-3 days.  She may return to work tomorrow.    Sincerely,        Connor Shankar MD

## 2020-01-01 NOTE — ED AVS SNAPSHOT
[facility]  [address] [address]  Phone:  [phone]                                    Lashae Middleton   MRN: [id number]    Department:  [facility]   Date of Visit:  1/1/2020           After Visit Summary Signature Page    I have received my discharge instructions, and my questions have been answered. I have discussed any challenges I see with this plan with the nurse or doctor.    ..........................................................................................................................................  Patient/Patient Representative Signature      ..........................................................................................................................................  Patient Representative Print Name and Relationship to Patient    ..................................................               ................................................  Date                                   Time    ..........................................................................................................................................  Reviewed by Signature/Title    ...................................................              ..............................................  Date                                               Time          22EPIC Rev 08/18

## 2020-01-01 NOTE — DISCHARGE INSTRUCTIONS
Please drink plenty of fluids, you may use OTC pain medication as needed for pain control, schedule a follow-up visit with your primary care provider or OB/GYN provider in next week, return to ED if your symptoms worsen or any concerns.

## 2020-01-01 NOTE — ED PROVIDER NOTES
History     Chief Complaint   Patient presents with     Vaginal Bleeding     HPI  Lashae Middleton is a 44 year old female who presents with vaginal bleeding which she noticed today which she describes as black present on her wipes and her underwear.  No gush of bleeding, she denies any abdominal pain or any unusual back pain.  She reports she has history of chronic musculoskeletal lower back pain.  No fever chills, no nausea or vomiting, no chest pain shortness of breath  No concerns about urination.  She denies any urinary frequency or burning during urination.  Patient reports her last menstrual period was 3 years ago.      Allergies:  Allergies   Allergen Reactions     Sulfa Drugs Anaphylaxis     Latex Hives     No Clinical Screening - See Comments      All cillins      Penicillins      Aspirin Rash     Sumatriptan Rash       Problem List:    Patient Active Problem List    Diagnosis Date Noted     Abdominal pain, epigastric-REFLUX-Protonix-8/2018 08/31/2018     Priority: Medium     Vaginal discharge with odor--9/2018 08/31/2018     Priority: Medium     Irritable bowel syndrome with both constipation and diarrhea-LACTOSE iintolerant--8/2018 08/31/2018     Priority: Medium     Pelvic pain in female 08/31/2018     Priority: Medium     Urinary frequency--UTI--9/2018 08/31/2018     Priority: Medium     Dyspareunia, female-after--9/2018 08/31/2018     Priority: Medium     Depression, recurrent (H) 10/25/2017     Priority: Medium     Abnormal uterine bleeding--S/P ENDOMETRIAL ABLATION, no  bleeding since--10/2017 09/21/2017     Priority: Medium     Migraine syndrome 09/12/2017     Priority: Medium     Hx of antibiotic allergy    PCN,  SULFA 03/20/2017     Priority: Medium     Obesity, Class II, BMI 35-39.9 03/20/2017     Priority: Medium     TMJ (temporomandibular joint syndrome) 03/20/2017     Priority: Medium        Past Medical History:    Past Medical History:   Diagnosis Date     Abnormal uterine bleeding (AUB)         Past Surgical History:    Past Surgical History:   Procedure Laterality Date     HC ABLATION, ENDOMETRIAL, THERMAL, W/O HYSTEROSCOPIC GUIDANCE       TUBAL LIGATION  2011       Family History:    Family History   Problem Relation Age of Onset     Other - See Comments Mother         endometriosis       Social History:  Marital Status:  Single [1]  Social History     Tobacco Use     Smoking status: Current Some Day Smoker     Types: Cigarettes     Smokeless tobacco: Former User     Tobacco comment: 2-3 cigarettes a day   Substance Use Topics     Alcohol use: Yes     Comment: wine coolers on occasion     Drug use: No        Medications:    cyclobenzaprine (FLEXERIL) 5 MG tablet  fluticasone (FLONASE) 50 MCG/ACT spray  rizatriptan (MAXALT) 5 MG tablet  albuterol (PROAIR HFA/PROVENTIL HFA/VENTOLIN HFA) 108 (90 Base) MCG/ACT inhaler  EPINEPHrine (EPIPEN/ADRENACLICK/OR ANY BX GENERIC EQUIV) 0.3 MG/0.3ML injection 2-pack  ondansetron (ZOFRAN-ODT) 4 MG ODT tab  pantoprazole (PROTONIX) 20 MG EC tablet          Review of Systems   Constitutional: Negative for chills and fever.   HENT: Negative for sore throat and voice change.    Eyes: Negative for photophobia and pain.   Respiratory: Positive for chest tightness. Negative for cough and shortness of breath.    Cardiovascular: Negative for chest pain and leg swelling.   Gastrointestinal: Negative for abdominal pain, blood in stool, constipation, nausea, rectal pain and vomiting.   Genitourinary: Positive for vaginal bleeding. Negative for difficulty urinating, dysuria, frequency, pelvic pain, vaginal discharge and vaginal pain.   Musculoskeletal: Positive for back pain. Negative for neck pain.   Skin: Negative for pallor.   Neurological: Negative for dizziness, weakness and headaches.   Psychiatric/Behavioral: Negative for confusion.       Physical Exam   BP: 120/81  Pulse: 80  Temp: 97.6  F (36.4  C)  Resp: 16  SpO2: 99 %      Physical Exam  Vitals signs and nursing note  reviewed. Exam conducted with a chaperone present.   Constitutional:       General: She is not in acute distress.     Appearance: Normal appearance. She is not ill-appearing or diaphoretic.   HENT:      Head: Normocephalic.      Mouth/Throat:      Mouth: Mucous membranes are moist.      Pharynx: Oropharynx is clear.   Eyes:      Extraocular Movements: Extraocular movements intact.      Conjunctiva/sclera: Conjunctivae normal.      Pupils: Pupils are equal, round, and reactive to light.   Neck:      Musculoskeletal: Neck supple.   Cardiovascular:      Rate and Rhythm: Normal rate and regular rhythm.      Pulses: Normal pulses.      Heart sounds: Normal heart sounds.   Abdominal:      General: Abdomen is flat.      Palpations: Abdomen is soft. There is no mass.      Tenderness: There is no abdominal tenderness. There is no right CVA tenderness, left CVA tenderness, guarding or rebound.      Hernia: No hernia is present.   Genitourinary:     Exam position: Supine.      Vagina: Normal.      Cervix: Cervical motion tenderness and cervical bleeding present.      Adnexa: Right adnexa normal.        Right: No tenderness.          Left: No tenderness.     Musculoskeletal: Normal range of motion.         General: No swelling or tenderness.   Skin:     General: Skin is warm.   Neurological:      General: No focal deficit present.      Mental Status: She is alert and oriented to person, place, and time.      Sensory: No sensory deficit.      Coordination: Coordination normal.   Psychiatric:         Mood and Affect: Mood normal.         ED Course     44-year-old female who presents with concern about her vaginal bleeding which she developed this morning when she wiped herself and she noticed blood on her underwear.  Patient denies any abdominal pain, no pelvic pain no fever or chills.  Last menstrual period was about 3 years ago.    Diagnostic tests CBC and CMP unremarkable with hemoglobin 14.1 and WBC level 4.3  Urinary  pregnancy test negative  Pelvic exam with speculum positive for scant bleeding from cervical os, positive CMT during pelvic exam  GC chlamydia GC chlamydia and vaginal cultures pending  Patient received 1 g of azithromycin p.o. and Rocephin 250 mg IM  Advised to schedule a follow-up visit with her primary care provider or OB-GYN provider next week  Red flag symptoms of vaginal bleeding discussed and patient was informed when she should look for immediate medical help at emergency department     Procedures             Results for orders placed or performed during the hospital encounter of 01/01/20 (from the past 24 hour(s))   CBC with platelets differential   Result Value Ref Range    WBC 4.3 4.0 - 11.0 10e9/L    RBC Count 4.27 3.8 - 5.2 10e12/L    Hemoglobin 14.1 11.7 - 15.7 g/dL    Hematocrit 41.8 35.0 - 47.0 %    MCV 98 78 - 100 fl    MCH 33.0 26.5 - 33.0 pg    MCHC 33.7 31.5 - 36.5 g/dL    RDW 13.3 10.0 - 15.0 %    Platelet Count 219 150 - 450 10e9/L    Diff Method Automated Method     % Neutrophils 58.7 %    % Lymphocytes 25.4 %    % Monocytes 10.9 %    % Eosinophils 3.2 %    % Basophils 1.6 %    % Immature Granulocytes 0.2 %    Nucleated RBCs 0 0 /100    Absolute Neutrophil 2.5 1.6 - 8.3 10e9/L    Absolute Lymphocytes 1.1 0.8 - 5.3 10e9/L    Absolute Monocytes 0.5 0.0 - 1.3 10e9/L    Absolute Eosinophils 0.1 0.0 - 0.7 10e9/L    Absolute Basophils 0.1 0.0 - 0.2 10e9/L    Abs Immature Granulocytes 0.0 0 - 0.4 10e9/L    Absolute Nucleated RBC 0.0    Comprehensive metabolic panel   Result Value Ref Range    Sodium 143 133 - 144 mmol/L    Potassium 3.9 3.4 - 5.3 mmol/L    Chloride 114 (H) 94 - 109 mmol/L    Carbon Dioxide 24 20 - 32 mmol/L    Anion Gap 5 3 - 14 mmol/L    Glucose 100 (H) 70 - 99 mg/dL    Urea Nitrogen 11 7 - 30 mg/dL    Creatinine 0.72 0.52 - 1.04 mg/dL    GFR Estimate >90 >60 mL/min/[1.73_m2]    GFR Estimate If Black >90 >60 mL/min/[1.73_m2]    Calcium 8.2 (L) 8.5 - 10.1 mg/dL    Bilirubin Total 0.5  0.2 - 1.3 mg/dL    Albumin 3.3 (L) 3.4 - 5.0 g/dL    Protein Total 6.9 6.8 - 8.8 g/dL    Alkaline Phosphatase 89 40 - 150 U/L    ALT 62 (H) 0 - 50 U/L    AST 35 0 - 45 U/L   HCG qualitative urine   Result Value Ref Range    HCG Qual Urine Negative NEG^Negative       Medications   cefTRIAXone (ROCEPHIN) injection 250 mg (250 mg Intramuscular Given 1/1/20 1016)   azithromycin (ZITHROMAX) tablet 1,000 mg (1,000 mg Oral Given 1/1/20 1017)       Assessments & Plan (with Medical Decision Making)     I have reviewed the nursing notes.    I have reviewed the findings, diagnosis, plan and need for follow up with the patient.      New Prescriptions    No medications on file       Final diagnoses:   Vaginal bleeding       1/1/2020   [facility]     Connor Shankar MD  01/01/20 1038

## 2020-01-03 ENCOUNTER — OFFICE VISIT (OUTPATIENT)
Dept: FAMILY MEDICINE | Facility: OTHER | Age: 45
End: 2020-01-03
Attending: FAMILY MEDICINE
Payer: COMMERCIAL

## 2020-01-03 ENCOUNTER — HOSPITAL ENCOUNTER (OUTPATIENT)
Dept: ULTRASOUND IMAGING | Facility: HOSPITAL | Age: 45
Discharge: HOME | End: 2020-01-03
Attending: FAMILY MEDICINE | Admitting: FAMILY MEDICINE
Payer: COMMERCIAL

## 2020-01-03 VITALS
OXYGEN SATURATION: 96 % | TEMPERATURE: 98.2 F | BODY MASS INDEX: 33.46 KG/M2 | WEIGHT: 196 LBS | DIASTOLIC BLOOD PRESSURE: 68 MMHG | SYSTOLIC BLOOD PRESSURE: 110 MMHG | HEART RATE: 76 BPM | HEIGHT: 64 IN

## 2020-01-03 DIAGNOSIS — Z98.890 S/P ENDOMETRIAL ABLATION: Primary | ICD-10-CM

## 2020-01-03 DIAGNOSIS — R10.13 ABDOMINAL PAIN, EPIGASTRIC: ICD-10-CM

## 2020-01-03 DIAGNOSIS — N93.9 VAGINAL BLEEDING: ICD-10-CM

## 2020-01-03 DIAGNOSIS — G43.909 MIGRAINE SYNDROME: ICD-10-CM

## 2020-01-03 DIAGNOSIS — J06.9 VIRAL UPPER RESPIRATORY TRACT INFECTION: ICD-10-CM

## 2020-01-03 LAB
BACTERIA SPEC CULT: NORMAL
SPECIMEN SOURCE: NORMAL

## 2020-01-03 PROCEDURE — 99214 OFFICE O/P EST MOD 30 MIN: CPT | Performed by: FAMILY MEDICINE

## 2020-01-03 PROCEDURE — G0463 HOSPITAL OUTPT CLINIC VISIT: HCPCS

## 2020-01-03 PROCEDURE — 76856 US EXAM PELVIC COMPLETE: CPT | Mod: TC

## 2020-01-03 PROCEDURE — G0463 HOSPITAL OUTPT CLINIC VISIT: HCPCS | Mod: 25

## 2020-01-03 RX ORDER — RIZATRIPTAN BENZOATE 5 MG/1
5-10 TABLET ORAL
Qty: 30 TABLET | Refills: 0 | Status: SHIPPED | OUTPATIENT
Start: 2020-01-03 | End: 2020-01-24

## 2020-01-03 RX ORDER — PANTOPRAZOLE SODIUM 20 MG/1
TABLET, DELAYED RELEASE ORAL
Qty: 60 TABLET | Refills: 0 | Status: SHIPPED | OUTPATIENT
Start: 2020-01-03 | End: 2020-02-21

## 2020-01-03 ASSESSMENT — PAIN SCALES - GENERAL: PAINLEVEL: MODERATE PAIN (5)

## 2020-01-03 ASSESSMENT — MIFFLIN-ST. JEOR: SCORE: 1514.05

## 2020-01-03 NOTE — NURSING NOTE
"Chief Complaint   Patient presents with     Headache     Er F/u       Initial /68   Pulse 76   Temp 98.2  F (36.8  C)   Ht 1.626 m (5' 4\")   Wt 88.9 kg (196 lb)   SpO2 96%   BMI 33.64 kg/m   Estimated body mass index is 33.64 kg/m  as calculated from the following:    Height as of this encounter: 1.626 m (5' 4\").    Weight as of this encounter: 88.9 kg (196 lb).  Medication Reconciliation: complete  Ghulam Guardado LPN  "

## 2020-01-03 NOTE — LETTER
[facility] [place]  [address]  [place] [address]  [phone]          January 3, 2020    RE:  Lashae Middleton                                                                                                                                                       [address] [place] [address]            To whom it may concern:    Lashae Middleton is under my professional care for lower respiratory tract infection symptoms.    She  may return to work with the following: The employee is UNABLE to return to work until 1/5.    When the patient returns to work, no restrictions.       Sincerely,        Melyssa Lewis MD

## 2020-01-07 ENCOUNTER — MYC MEDICAL ADVICE (OUTPATIENT)
Dept: FAMILY MEDICINE | Facility: OTHER | Age: 45
End: 2020-01-07

## 2020-01-07 ENCOUNTER — HOSPITAL ENCOUNTER (EMERGENCY)
Facility: HOSPITAL | Age: 45
Discharge: HOME | End: 2020-01-07
Attending: NURSE PRACTITIONER | Admitting: NURSE PRACTITIONER
Payer: COMMERCIAL

## 2020-01-07 VITALS
RESPIRATION RATE: 14 BRPM | DIASTOLIC BLOOD PRESSURE: 83 MMHG | OXYGEN SATURATION: 100 % | TEMPERATURE: 98.5 F | SYSTOLIC BLOOD PRESSURE: 133 MMHG

## 2020-01-07 DIAGNOSIS — L08.9 LOCAL INFECTION OF SKIN AND SUBCUTANEOUS TISSUE: ICD-10-CM

## 2020-01-07 PROCEDURE — 87205 SMEAR GRAM STAIN: CPT | Performed by: NURSE PRACTITIONER

## 2020-01-07 PROCEDURE — G0463 HOSPITAL OUTPT CLINIC VISIT: HCPCS

## 2020-01-07 PROCEDURE — 99213 OFFICE O/P EST LOW 20 MIN: CPT | Mod: Z6 | Performed by: NURSE PRACTITIONER

## 2020-01-07 PROCEDURE — 87070 CULTURE OTHR SPECIMN AEROBIC: CPT | Performed by: NURSE PRACTITIONER

## 2020-01-07 RX ORDER — MUPIROCIN 20 MG/G
OINTMENT TOPICAL 3 TIMES DAILY
Qty: 1 G | Refills: 0 | Status: SHIPPED | OUTPATIENT
Start: 2020-01-07 | End: 2020-04-03

## 2020-01-07 ASSESSMENT — ENCOUNTER SYMPTOMS
CHILLS: 0
ACTIVITY CHANGE: 1
FEVER: 0

## 2020-01-07 NOTE — ED TRIAGE NOTES
Pt presents with a spot on her pubic area that she is thinking that it is an ingrown hair. States that it is painful and it was bleeding because she scratched it.

## 2020-01-07 NOTE — ED AVS SNAPSHOT
[facility]  [address] [address]  Phone:  [phone]                                    Lashae Middleton   MRN: [id number]    Department:  [facility]   Date of Visit:  1/7/2020           After Visit Summary Signature Page    I have received my discharge instructions, and my questions have been answered. I have discussed any challenges I see with this plan with the nurse or doctor.    ..........................................................................................................................................  Patient/Patient Representative Signature      ..........................................................................................................................................  Patient Representative Print Name and Relationship to Patient    ..................................................               ................................................  Date                                   Time    ..........................................................................................................................................  Reviewed by Signature/Title    ...................................................              ..............................................  Date                                               Time          22EPIC Rev 08/18

## 2020-01-07 NOTE — ED PROVIDER NOTES
"  History     Chief Complaint   Patient presents with     mrsa exposure     notes has a \"spot\" on her groin area that she wants tested for mrsa, notes she was around someone who had mrsa     HPI  Lashae Middleton is a 44 year old female who noticed an open blister on her mons pubis. She noticed it today. She is concerned if it is MRSA or an ingrown hair. Denies fevers, chills, nausea, vomiting, diarrhea, and shortness of breath.      Allergies:  Allergies   Allergen Reactions     Sulfa Drugs Anaphylaxis     Latex Hives     No Clinical Screening - See Comments      All cillins      Penicillins      Aspirin Rash     Sumatriptan Rash       Problem List:    Patient Active Problem List    Diagnosis Date Noted     S/P endometrial ablation 01/03/2020     Priority: Medium     Abdominal pain, epigastric-REFLUX-Protonix-8/2018 08/31/2018     Priority: Medium     Vaginal discharge with odor--9/2018 08/31/2018     Priority: Medium     Irritable bowel syndrome with both constipation and diarrhea-LACTOSE iintolerant--8/2018 08/31/2018     Priority: Medium     Pelvic pain in female 08/31/2018     Priority: Medium     Urinary frequency--UTI--9/2018 08/31/2018     Priority: Medium     Dyspareunia, female-after--9/2018 08/31/2018     Priority: Medium     Depression, recurrent (H) 10/25/2017     Priority: Medium     Abnormal uterine bleeding--S/P ENDOMETRIAL ABLATION, no  bleeding since--10/2017 09/21/2017     Priority: Medium     Migraine syndrome 09/12/2017     Priority: Medium     Hx of antibiotic allergy    PCN,  SULFA 03/20/2017     Priority: Medium     Obesity, Class II, BMI 35-39.9 03/20/2017     Priority: Medium     TMJ (temporomandibular joint syndrome) 03/20/2017     Priority: Medium        Past Medical History:    Past Medical History:   Diagnosis Date     Abnormal uterine bleeding (AUB)        Past Surgical History:    Past Surgical History:   Procedure Laterality Date     HC ABLATION, ENDOMETRIAL, THERMAL, W/O " Please see 09/17/18 telephone encounter \"Blood pressure\"   HYSTEROSCOPIC GUIDANCE       TUBAL LIGATION  2011       Family History:    Family History   Problem Relation Age of Onset     Other - See Comments Mother         endometriosis       Social History:  Marital Status:  Single [1]  Social History     Tobacco Use     Smoking status: Current Some Day Smoker     Packs/day: 0.50     Years: 30.00     Pack years: 15.00     Types: Cigarettes     Smokeless tobacco: Former User     Tobacco comment: 2-3 cigarettes a day   Substance Use Topics     Alcohol use: Yes     Comment: wine coolers on occasion     Drug use: No        Medications:    albuterol (PROAIR HFA/PROVENTIL HFA/VENTOLIN HFA) 108 (90 Base) MCG/ACT inhaler  benzonatate (TESSALON) 200 MG capsule  cyclobenzaprine (FLEXERIL) 5 MG tablet  fluticasone (FLONASE) 50 MCG/ACT spray  mupirocin (BACTROBAN) 2 % external ointment  ondansetron (ZOFRAN-ODT) 4 MG ODT tab  pantoprazole (PROTONIX) 20 MG EC tablet  rizatriptan (MAXALT) 5 MG tablet  EPINEPHrine (EPIPEN/ADRENACLICK/OR ANY BX GENERIC EQUIV) 0.3 MG/0.3ML injection 2-pack          Review of Systems   Constitutional: Positive for activity change. Negative for chills and fever.   Genitourinary: Negative.    Skin: Positive for color change.        blister       Physical Exam   BP: 133/83  Heart Rate: 77  Temp: 98.5  F (36.9  C)  Resp: 14  SpO2: 100 %      Physical Exam  Vitals signs and nursing note reviewed.   Constitutional:       General: She is not in acute distress.  Cardiovascular:      Rate and Rhythm: Normal rate.   Pulmonary:      Effort: Pulmonary effort is normal.   Abdominal:      Palpations: Abdomen is soft.   Skin:     General: Skin is warm and dry.      Findings: Erythema (minimal), lesion and rash present. Rash is vesicular.          Neurological:      Mental Status: She is alert and oriented to person, place, and time.   Psychiatric:         Behavior: Behavior normal.         ED Course        Procedures                 No results found for this or any previous  visit (from the past 24 hour(s)).    Medications - No data to display    Assessments & Plan (with Medical Decision Making)     I have reviewed the nursing notes.    I have reviewed the findings, diagnosis, plan and need for follow up with the patient.  (L08.9) Local infection of skin and subcutaneous tissue    Comment: 44 year old female presents with excoriated 4 mm  Vesicle in the upper fold of her mons pubis. Wound culture collected and sent to lab. Denies pain and fevers.    Plan: mupirocin tid for five days. Education provided on this medication.  MRSA education provided for your own knowledge base. It does not mean you have MRSA.  A culture was sent and we will notify you if it positive.    Watch for signs of infection, including fever, increased redness, purulent drainage from wound, and increased pain. Return to be further evaluated if you develop signs of infection of worsening symptoms  These discharge instructions and medications were reviewed with her and understanding verbalized.              Discharge Medication List as of 1/7/2020  4:34 PM      START taking these medications    Details   mupirocin (BACTROBAN) 2 % external ointment Apply topically 3 times daily for 5 daysDisp-1 g, [id number]             Final diagnoses:   Local infection of skin and subcutaneous tissue       1/7/2020   [facility]       Brigette Mirza CNP  01/09/20 2023

## 2020-01-07 NOTE — DISCHARGE INSTRUCTIONS
This information is for your own knowledge base. It does not mean you have MRSA.  A culture was sent and we will notify you if it positive.    Watch for signs of infection, including fever, increased redness, purulent drainage from wound, and increased pain. Return to be further evaluated if you develop signs of infection of worsening symptoms

## 2020-01-08 LAB
GRAM STN SPEC: ABNORMAL
SPECIMEN SOURCE: ABNORMAL

## 2020-01-09 ENCOUNTER — MYC MEDICAL ADVICE (OUTPATIENT)
Dept: FAMILY MEDICINE | Facility: OTHER | Age: 45
End: 2020-01-09

## 2020-01-09 LAB
BACTERIA SPEC CULT: NORMAL
BACTERIA SPEC CULT: NORMAL
SPECIMEN SOURCE: NORMAL

## 2020-01-09 ASSESSMENT — ENCOUNTER SYMPTOMS
COLOR CHANGE: 1
ROS SKIN COMMENTS: BLISTER

## 2020-01-10 ENCOUNTER — MYC MEDICAL ADVICE (OUTPATIENT)
Dept: FAMILY MEDICINE | Facility: OTHER | Age: 45
End: 2020-01-10

## 2020-01-22 DIAGNOSIS — G43.909 MIGRAINE SYNDROME: ICD-10-CM

## 2020-01-22 NOTE — TELEPHONE ENCOUNTER
rizatriptan (MAXALT) 5 MG tablet  Last visit date with prescribing provider: 1-3-2020  Last refill date: 1-3-2020  Quantity: 30, Refills: 0    Ramya Jones LPN      Next 5 appointments (look out 90 days)    Jan 31, 2020  3:00 PM CST  (Arrive by 2:45 PM)  Office Visit with Tirso Gomez MD  [facility] [place] ([facility] ) [address]  [place] [address]  [phone]   Apr 03, 2020  3:30 PM CDT  (Arrive by 3:15 PM)  SHORT with Melyssa Lewis MD  [facility] ([facility] ) [address]  [place] [address]  [phone]

## 2020-01-23 DIAGNOSIS — F33.9 DEPRESSION, RECURRENT (H): ICD-10-CM

## 2020-01-24 RX ORDER — RIZATRIPTAN BENZOATE 5 MG/1
5-10 TABLET ORAL
Qty: 30 TABLET | Refills: 0 | Status: SHIPPED | OUTPATIENT
Start: 2020-01-24 | End: 2020-02-19

## 2020-01-27 ENCOUNTER — APPOINTMENT (OUTPATIENT)
Dept: ULTRASOUND IMAGING | Facility: HOSPITAL | Age: 45
End: 2020-01-27
Attending: NURSE PRACTITIONER
Payer: COMMERCIAL

## 2020-01-27 ENCOUNTER — HOSPITAL ENCOUNTER (EMERGENCY)
Facility: HOSPITAL | Age: 45
Discharge: HOME | End: 2020-01-27
Attending: NURSE PRACTITIONER | Admitting: NURSE PRACTITIONER
Payer: COMMERCIAL

## 2020-01-27 VITALS
SYSTOLIC BLOOD PRESSURE: 131 MMHG | OXYGEN SATURATION: 96 % | TEMPERATURE: 97.1 F | DIASTOLIC BLOOD PRESSURE: 87 MMHG | RESPIRATION RATE: 16 BRPM

## 2020-01-27 DIAGNOSIS — N93.9 VAGINA BLEEDING: ICD-10-CM

## 2020-01-27 DIAGNOSIS — R10.2 PELVIC PAIN IN FEMALE: Primary | ICD-10-CM

## 2020-01-27 LAB
ALBUMIN UR-MCNC: 10 MG/DL
ANION GAP SERPL CALCULATED.3IONS-SCNC: 3 MMOL/L (ref 3–14)
APPEARANCE UR: CLEAR
BACTERIA #/AREA URNS HPF: ABNORMAL /HPF
BASOPHILS # BLD AUTO: 0.1 10E9/L (ref 0–0.2)
BASOPHILS NFR BLD AUTO: 1 %
BILIRUB UR QL STRIP: NEGATIVE
BUN SERPL-MCNC: 16 MG/DL (ref 7–30)
CALCIUM SERPL-MCNC: 8.5 MG/DL (ref 8.5–10.1)
CHLORIDE SERPL-SCNC: 108 MMOL/L (ref 94–109)
CO2 SERPL-SCNC: 29 MMOL/L (ref 20–32)
COLOR UR AUTO: YELLOW
CREAT SERPL-MCNC: 0.77 MG/DL (ref 0.52–1.04)
DIFFERENTIAL METHOD BLD: NORMAL
EOSINOPHIL # BLD AUTO: 0.2 10E9/L (ref 0–0.7)
EOSINOPHIL NFR BLD AUTO: 2.7 %
ERYTHROCYTE [DISTWIDTH] IN BLOOD BY AUTOMATED COUNT: 13.2 % (ref 10–15)
GFR SERPL CREATININE-BSD FRML MDRD: >90 ML/MIN/{1.73_M2}
GLUCOSE SERPL-MCNC: 89 MG/DL (ref 70–99)
GLUCOSE UR STRIP-MCNC: NEGATIVE MG/DL
HCT VFR BLD AUTO: 40.6 % (ref 35–47)
HGB BLD-MCNC: 13.5 G/DL (ref 11.7–15.7)
HGB UR QL STRIP: ABNORMAL
HYALINE CASTS #/AREA URNS LPF: 1 /LPF
IMM GRANULOCYTES # BLD: 0 10E9/L (ref 0–0.4)
IMM GRANULOCYTES NFR BLD: 0.4 %
KETONES UR STRIP-MCNC: NEGATIVE MG/DL
LEUKOCYTE ESTERASE UR QL STRIP: NEGATIVE
LYMPHOCYTES # BLD AUTO: 2.6 10E9/L (ref 0.8–5.3)
LYMPHOCYTES NFR BLD AUTO: 35 %
MCH RBC QN AUTO: 32.4 PG (ref 26.5–33)
MCHC RBC AUTO-ENTMCNC: 33.3 G/DL (ref 31.5–36.5)
MCV RBC AUTO: 97 FL (ref 78–100)
MONOCYTES # BLD AUTO: 0.6 10E9/L (ref 0–1.3)
MONOCYTES NFR BLD AUTO: 7.8 %
MUCOUS THREADS #/AREA URNS LPF: PRESENT /LPF
NEUTROPHILS # BLD AUTO: 3.9 10E9/L (ref 1.6–8.3)
NEUTROPHILS NFR BLD AUTO: 53.1 %
NITRATE UR QL: NEGATIVE
NRBC # BLD AUTO: 0 10*3/UL
NRBC BLD AUTO-RTO: 0 /100
PH UR STRIP: 5.5 PH (ref 4.7–8)
PLATELET # BLD AUTO: 238 10E9/L (ref 150–450)
POTASSIUM SERPL-SCNC: 4 MMOL/L (ref 3.4–5.3)
RBC # BLD AUTO: 4.17 10E12/L (ref 3.8–5.2)
RBC #/AREA URNS AUTO: 11 /HPF (ref 0–2)
SODIUM SERPL-SCNC: 140 MMOL/L (ref 133–144)
SOURCE: ABNORMAL
SP GR UR STRIP: 1.03 (ref 1–1.03)
SQUAMOUS #/AREA URNS AUTO: 4 /HPF (ref 0–1)
UROBILINOGEN UR STRIP-MCNC: 2 MG/DL (ref 0–2)
WBC # BLD AUTO: 7.3 10E9/L (ref 4–11)
WBC #/AREA URNS AUTO: 3 /HPF (ref 0–5)

## 2020-01-27 PROCEDURE — 80048 BASIC METABOLIC PNL TOTAL CA: CPT | Performed by: NURSE PRACTITIONER

## 2020-01-27 PROCEDURE — 99284 EMERGENCY DEPT VISIT MOD MDM: CPT | Mod: 25

## 2020-01-27 PROCEDURE — 85025 COMPLETE CBC W/AUTO DIFF WBC: CPT | Performed by: NURSE PRACTITIONER

## 2020-01-27 PROCEDURE — 36415 COLL VENOUS BLD VENIPUNCTURE: CPT | Performed by: NURSE PRACTITIONER

## 2020-01-27 PROCEDURE — 76830 TRANSVAGINAL US NON-OB: CPT | Mod: TC

## 2020-01-27 PROCEDURE — 25000132 ZZH RX MED GY IP 250 OP 250 PS 637: Performed by: NURSE PRACTITIONER

## 2020-01-27 PROCEDURE — 81001 URINALYSIS AUTO W/SCOPE: CPT | Performed by: NURSE PRACTITIONER

## 2020-01-27 PROCEDURE — 99284 EMERGENCY DEPT VISIT MOD MDM: CPT | Mod: Z6 | Performed by: NURSE PRACTITIONER

## 2020-01-27 RX ORDER — IBUPROFEN 800 MG/1
800 TABLET, FILM COATED ORAL EVERY 8 HOURS PRN
Qty: 60 TABLET | Refills: 0 | Status: SHIPPED | OUTPATIENT
Start: 2020-01-27 | End: 2020-04-15

## 2020-01-27 RX ORDER — ACETAMINOPHEN 325 MG/1
650 TABLET ORAL ONCE
Status: COMPLETED | OUTPATIENT
Start: 2020-01-27 | End: 2020-01-27

## 2020-01-27 RX ORDER — HYDROCODONE BITARTRATE AND ACETAMINOPHEN 5; 325 MG/1; MG/1
1 TABLET ORAL EVERY 6 HOURS PRN
Qty: 10 TABLET | Refills: 0 | Status: SHIPPED | OUTPATIENT
Start: 2020-01-27 | End: 2020-04-15

## 2020-01-27 RX ORDER — NAPROXEN 500 MG/1
500 TABLET ORAL ONCE
Status: COMPLETED | OUTPATIENT
Start: 2020-01-27 | End: 2020-01-27

## 2020-01-27 RX ORDER — KETOROLAC TROMETHAMINE 30 MG/ML
30 INJECTION, SOLUTION INTRAMUSCULAR; INTRAVENOUS ONCE
Status: DISCONTINUED | OUTPATIENT
Start: 2020-01-27 | End: 2020-01-27 | Stop reason: HOSPADM

## 2020-01-27 RX ADMIN — ACETAMINOPHEN 650 MG: 325 TABLET, FILM COATED ORAL at 15:10

## 2020-01-27 RX ADMIN — NAPROXEN 500 MG: 500 TABLET ORAL at 13:47

## 2020-01-27 NOTE — ED PROVIDER NOTES
"  History     Chief Complaint   Patient presents with     Pelvic Pain     sudden onset of a sharp pelvic pain and then vaginal bleeding started. hx uterine ablation.      Vaginal Bleeding     started approximately 40 minutes ago. \"dark blood and a lot of it.\"      HPI     Lashae Middleton is a 44 year old female who presents ambulatory with concerns of 10/10 sharp, shooting pelvic pain that lasted 30 minutes. \"Pain let up and now it is a constant sharp, just not as bad.\" Currently 10/10. Laying increases discomfort. Vaginal bleeding started today- \"it is dark, almost looks like old blood.\"  She has a history of similar pain and has been evaluated for it several times in the past per her reports.     . S/p ablation in 2017. Denies recent fever, chills, n/v/d,       Allergies:  Allergies   Allergen Reactions     Sulfa Drugs Anaphylaxis     Latex Hives     No Clinical Screening - See Comments      All cillins      Penicillins      Aspirin Rash     Sumatriptan Rash       Problem List:    Patient Active Problem List    Diagnosis Date Noted     S/P endometrial ablation 2020     Priority: Medium     Abdominal pain, epigastric-REFLUX-Protonix-2018     Priority: Medium     Vaginal discharge with odor--2018     Priority: Medium     Irritable bowel syndrome with both constipation and diarrhea-LACTOSE iintolerant--2018     Priority: Medium     Pelvic pain in female 2018     Priority: Medium     Urinary frequency--UTI--2018     Priority: Medium     Dyspareunia, female-after--2018     Priority: Medium     Depression, recurrent (H) 10/25/2017     Priority: Medium     Abnormal uterine bleeding--S/P ENDOMETRIAL ABLATION, no  bleeding since--10/2017 2017     Priority: Medium     Migraine syndrome 2017     Priority: Medium     Hx of antibiotic allergy    PCN,  SULFA 2017     Priority: Medium     Obesity, Class II, BMI 35-39.9 " 03/20/2017     Priority: Medium     TMJ (temporomandibular joint syndrome) 03/20/2017     Priority: Medium        Past Medical History:    Past Medical History:   Diagnosis Date     Abnormal uterine bleeding (AUB)        Past Surgical History:    Past Surgical History:   Procedure Laterality Date     HC ABLATION, ENDOMETRIAL, THERMAL, W/O HYSTEROSCOPIC GUIDANCE       TUBAL LIGATION  2011       Family History:    Family History   Problem Relation Age of Onset     Other - See Comments Mother         endometriosis       Social History:  Marital Status:  Single [1]  Social History     Tobacco Use     Smoking status: Current Some Day Smoker     Packs/day: 0.50     Years: 30.00     Pack years: 15.00     Types: Cigarettes     Smokeless tobacco: Former User     Tobacco comment: 2-3 cigarettes a day   Substance Use Topics     Alcohol use: Yes     Comment: wine coolers on occasion     Drug use: No        Medications:    ibuprofen (ADVIL/MOTRIN) 800 MG tablet  pantoprazole (PROTONIX) 20 MG EC tablet  rizatriptan (MAXALT) 5 MG tablet  albuterol (PROAIR HFA/PROVENTIL HFA/VENTOLIN HFA) 108 (90 Base) MCG/ACT inhaler  benzonatate (TESSALON) 200 MG capsule  buPROPion (WELLBUTRIN XL) 150 MG 24 hr tablet  cyclobenzaprine (FLEXERIL) 5 MG tablet  EPINEPHrine (EPIPEN/ADRENACLICK/OR ANY BX GENERIC EQUIV) 0.3 MG/0.3ML injection 2-pack  fluticasone (FLONASE) 50 MCG/ACT spray  ondansetron (ZOFRAN-ODT) 4 MG ODT tab          Review of Systems   Constitutional: Negative for chills and fever.   Respiratory: Negative for shortness of breath.    Cardiovascular: Negative for chest pain.   Gastrointestinal: Positive for abdominal pain (lower pelvic pain). Negative for constipation, diarrhea, nausea and vomiting.   Genitourinary: Positive for pelvic pain and vaginal bleeding. Negative for difficulty urinating, dysuria, frequency, hematuria and vaginal pain.   Neurological: Negative for dizziness, light-headedness and headaches.       Physical Exam    BP: 136/82  Heart Rate: 87  Temp: 97.1  F (36.2  C)  Resp: 16  SpO2: 100 %      Physical Exam  Constitutional:       General: She is not in acute distress (laying comfortably on ED stretcher on phone).     Appearance: Normal appearance. She is not ill-appearing, toxic-appearing or diaphoretic.   Cardiovascular:      Rate and Rhythm: Normal rate and regular rhythm.      Heart sounds: S1 normal and S2 normal. No murmur. No friction rub. No gallop.    Pulmonary:      Effort: Pulmonary effort is normal. No tachypnea or respiratory distress.      Breath sounds: Normal breath sounds. No wheezing, rhonchi or rales.   Abdominal:      General: Bowel sounds are normal. There is no distension.      Palpations: Abdomen is soft.      Tenderness: There is abdominal tenderness in the suprapubic area. There is no right CVA tenderness, left CVA tenderness, guarding or rebound.   Musculoskeletal:      Right lower leg: No edema.      Left lower leg: No edema.   Skin:     General: Skin is warm and dry.      Capillary Refill: Capillary refill takes less than 2 seconds.      Coloration: Skin is not pale.   Neurological:      Mental Status: She is alert and oriented to person, place, and time.      Gait: Gait is intact.   Psychiatric:         Mood and Affect: Mood normal.         Speech: Speech normal.         Behavior: Behavior normal. Behavior is cooperative.         ED Course     ED Course as of Feb 02 1218 Mon Jan 27, 2020   1501 Dr. Youssef to come see patient in the ED.   Radha Joshi CNP on 1/27/2020 at 3:02 PM          Procedures    Results for orders placed or performed during the hospital encounter of 01/27/20   US Pelvic Complete with Transvaginal     Status: None    Narrative    US PELVIC COMPLETE WITH TRANSVAGINAL    HISTORY: 44 years Female with pelvic pain. Patient has history of  ablation 2 years ago.    TECHNIQUE: Transvaginal grayscale and color duplex ultrasonography of  the pelvis was performed.    COMPARISON:  1/3/2020    FINDINGS:    The uterus measures 8.2 x 5.2 x 5.8 cm.    Endometrium: There is an elliptical form cyst in the lower uterine  segment now measuring 1.7 x 0.7 x 1.5 cm. This is larger than that  seen on the prior study. Some slightly echogenic debris is visible  within this cyst. Previously this measured 2.1 x 2.2 x 1.7 cm    The right ovary is not visualized    The left ovary is not visualized    Myometrial tissue has some posterior acoustical shadowing close to the  endometrial stripe obscuring visualization of the endometrium.      Impression    IMPRESSION: There is a mildly complex cyst within the lower uterine  segment, possibly within the endocervical canal. This has decreased in  size since the prior study.    GRUPO SAWYER MD   UA reflex to Microscopic and Culture     Status: Abnormal   Result Value Ref Range    Color Urine Yellow     Appearance Urine Clear     Glucose Urine Negative NEG^Negative mg/dL    Bilirubin Urine Negative NEG^Negative    Ketones Urine Negative NEG^Negative mg/dL    Specific Gravity Urine 1.033 1.003 - 1.035    Blood Urine Moderate (A) NEG^Negative    pH Urine 5.5 4.7 - 8.0 pH    Protein Albumin Urine 10 (A) NEG^Negative mg/dL    Urobilinogen mg/dL 2.0 0.0 - 2.0 mg/dL    Nitrite Urine Negative NEG^Negative    Leukocyte Esterase Urine Negative NEG^Negative    Source Midstream Urine     RBC Urine 11 (H) 0 - 2 /HPF    WBC Urine 3 0 - 5 /HPF    Bacteria Urine Few (A) NEG^Negative /HPF    Squamous Epithelial /HPF Urine 4 (H) 0 - 1 /HPF    Mucous Urine Present (A) NEG^Negative /LPF    Hyaline Casts 1 (A) OTO2^O - 2 /LPF   Basic metabolic panel     Status: None   Result Value Ref Range    Sodium 140 133 - 144 mmol/L    Potassium 4.0 3.4 - 5.3 mmol/L    Chloride 108 94 - 109 mmol/L    Carbon Dioxide 29 20 - 32 mmol/L    Anion Gap 3 3 - 14 mmol/L    Glucose 89 70 - 99 mg/dL    Urea Nitrogen 16 7 - 30 mg/dL    Creatinine 0.77 0.52 - 1.04 mg/dL    GFR Estimate >90 >60  "mL/min/[1.73_m2]    GFR Estimate If Black >90 >60 mL/min/[1.73_m2]    Calcium 8.5 8.5 - 10.1 mg/dL   CBC with platelets differential     Status: None   Result Value Ref Range    WBC 7.3 4.0 - 11.0 10e9/L    RBC Count 4.17 3.8 - 5.2 10e12/L    Hemoglobin 13.5 11.7 - 15.7 g/dL    Hematocrit 40.6 35.0 - 47.0 %    MCV 97 78 - 100 fl    MCH 32.4 26.5 - 33.0 pg    MCHC 33.3 31.5 - 36.5 g/dL    RDW 13.2 10.0 - 15.0 %    Platelet Count 238 150 - 450 10e9/L    Diff Method Automated Method     % Neutrophils 53.1 %    % Lymphocytes 35.0 %    % Monocytes 7.8 %    % Eosinophils 2.7 %    % Basophils 1.0 %    % Immature Granulocytes 0.4 %    Nucleated RBCs 0 0 /100    Absolute Neutrophil 3.9 1.6 - 8.3 10e9/L    Absolute Lymphocytes 2.6 0.8 - 5.3 10e9/L    Absolute Monocytes 0.6 0.0 - 1.3 10e9/L    Absolute Eosinophils 0.2 0.0 - 0.7 10e9/L    Absolute Basophils 0.1 0.0 - 0.2 10e9/L    Abs Immature Granulocytes 0.0 0 - 0.4 10e9/L    Absolute Nucleated RBC 0.0              No results found for this or any previous visit (from the past 24 hour(s)).    Medications   naproxen (NAPROSYN) tablet 500 mg (500 mg Oral Given 1/27/20 1347)   acetaminophen (TYLENOL) tablet 650 mg (650 mg Oral Given 1/27/20 1510)       Assessments & Plan (with Medical Decision Making)     I have reviewed the nursing notes.    I have reviewed the findings, diagnosis, plan and need for follow up with the patient.  (R10.2) Pelvic pain in female  (primary encounter diagnosis)  (N93.9) Vagina bleeding  Comment: acute, symptomatic  Plan: US shows smaller cyst in uterus/cervix - suspecting this could be source of \"dark, old\" looking blood. US did not show ovaries. No leukocytosis or anemia, no acute renal abnormalities, urinalysis contaminated and negative for infection - blood is likely from vaginal discharge. She denies any urinary symptoms. She does not want CT scan for further evaluation. She also does not want to be seen by on-call GYN due to previous clinic visit " she was not happy that she was told her pelvic pain was GI related.   She will use tylenol, ibuprofen, heat at home for discomfort  Hydrocodone as directed.    Narcotics are intended for short term use of acute pain.  They are not typically indicated for long term use or treatment of chronic pain due to risks and side effects.  Ensure you are taking your narcotic prescription as prescribed and using sparingly after tryin.) non-pharmacologic (Rest, Ice and/or Heat, Compression, Elevation, Massage, Topical anesthetic such as Bengay, Biofreeze, lidocaine, etc, chiropractor, support braces, others) and   2.) non-narcotic analgesics such as Tylenol 500-650 mg four times daily and/or ibuprofen 800 mg every 8 hours with food to avoid stomach upset. If you have ever been told to avoid acetaminophen (Tylenol) due to liver disease or nonsteroidal antiinflammatories (NASIDs) due to kidney disease, gastritis, stomach ulcers, etc. Avoid use.  Avoid operating vehicle/heavy equipment while taking narcotics. Also avoid drinking alcohol and using elicit substances while taking narcotics as doing so may increase your risk of side effects including respiratory depression which can lead to death.  Common side effects of narcotics include but are not limited to: constipation, nausea, vomiting, dizziness, sedation.           RETURN TO THE ED WITH NEW OR WORSENING SYMPTOMS.    KEEP APPOINTMENT WITH GYN ON Friday.    Discharge Medication List as of 2020  3:51 PM      START taking these medications    Details   HYDROcodone-acetaminophen (NORCO) 5-325 MG tablet Take 1 tablet by mouth every 6 hours as needed for severe pain, Disp-10 tablet, R-0, E-Prescribe      ibuprofen (ADVIL/MOTRIN) 800 MG tablet Take 1 tablet (800 mg) by mouth every 8 hours as needed for moderate pain, Disp-60 tablet, R-0, E-Prescribe             Final diagnoses:   Pelvic pain in female   Vagina bleeding     Radha Joshi CNP   2020   [facility]  [facility], CNP  02/02/20 1225

## 2020-01-27 NOTE — ED AVS SNAPSHOT
[facility]  [address] [address]  Phone:  [phone]                                    Lashae Middleton   MRN: [id number]    Department:  [facility]   Date of Visit:  1/27/2020           After Visit Summary Signature Page    I have received my discharge instructions, and my questions have been answered. I have discussed any challenges I see with this plan with the nurse or doctor.    ..........................................................................................................................................  Patient/Patient Representative Signature      ..........................................................................................................................................  Patient Representative Print Name and Relationship to Patient    ..................................................               ................................................  Date                                   Time    ..........................................................................................................................................  Reviewed by Signature/Title    ...................................................              ..............................................  Date                                               Time          22EPIC Rev 08/18

## 2020-01-27 NOTE — DISCHARGE INSTRUCTIONS
"(R10.2) Pelvic pain in female  (primary encounter diagnosis)  (N93.9) Vagina bleeding  Comment: acute, symptomatic  Plan: US shows smaller cyst in uterus/cervix - suspecting this could be source of \"dark, old\" looking blood. She does not want CT scan for further evaluation  She will use tylenol, ibuprofen, heat at home for discomfort  Hydrocodone as directed.    Narcotics are intended for short term use of acute pain.  They are not typically indicated for long term use or treatment of chronic pain due to risks and side effects.  Ensure you are taking your narcotic prescription as prescribed and using sparingly after tryin.) non-pharmacologic (Rest, Ice and/or Heat, Compression, Elevation, Massage, Topical anesthetic such as Bengay, Biofreeze, lidocaine, etc, chiropractor, support braces, others) and   2.) non-narcotic analgesics such as Tylenol 500-650 mg four times daily and/or ibuprofen 800 mg every 8 hours with food to avoid stomach upset. If you have ever been told to avoid acetaminophen (Tylenol) due to liver disease or nonsteroidal antiinflammatories (NASIDs) due to kidney disease, gastritis, stomach ulcers, etc. Avoid use.  Avoid operating vehicle/heavy equipment while taking narcotics. Also avoid drinking alcohol and using elicit substances while taking narcotics as doing so may increase your risk of side effects including respiratory depression which can lead to death.  Common side effects of narcotics include but are not limited to: constipation, nausea, vomiting, dizziness, sedation.           RETURN TO THE ED WITH NEW OR WORSENING SYMPTOMS.    KEEP APPOINTMENT WITH GYN ON Friday.      Radha Joshi CNP        "

## 2020-01-27 NOTE — TELEPHONE ENCOUNTER
Wellbutrin       Last Written Prescription Date:  8/29/2019 medication discontinue by   Last Fill Quantity: 30,   # refills: 1  Last Office Visit: 1/03/2019  Future Office visit:    Next 5 appointments (look out 90 days)    Jan 31, 2020  3:00 PM CST  (Arrive by 2:45 PM)  Office Visit with Tirso Gomez MD  [facility] [place] ([facility] [place] ) [address] [place] [address]  [phone]   Apr 03, 2020  3:30 PM CDT  (Arrive by 3:15 PM)  SHORT with Melyssa Lewis MD  [facility] [place] ([facility] [place] ) [address]  [place] [address]  [phone]

## 2020-01-27 NOTE — ED NOTES
Patient given verbal and written discharge instructions. Patient verbalized understanding of discharge instructions. Rx sent Norco and Ibuprofen 800mg sent to [facility] pharmacy. Work excuse given to pt

## 2020-01-28 RX ORDER — BUPROPION HYDROCHLORIDE 150 MG/1
TABLET ORAL
Qty: 30 TABLET | Refills: 0 | Status: SHIPPED | OUTPATIENT
Start: 2020-01-28 | End: 2020-05-19

## 2020-01-29 ENCOUNTER — OFFICE VISIT (OUTPATIENT)
Dept: CHIROPRACTIC MEDICINE | Facility: OTHER | Age: 45
End: 2020-01-29
Attending: CHIROPRACTOR
Payer: COMMERCIAL

## 2020-01-29 DIAGNOSIS — M99.03 SEGMENTAL AND SOMATIC DYSFUNCTION OF LUMBAR REGION: Primary | ICD-10-CM

## 2020-01-29 DIAGNOSIS — M54.50 ACUTE BILATERAL LOW BACK PAIN WITHOUT SCIATICA: ICD-10-CM

## 2020-01-29 DIAGNOSIS — M99.02 SEGMENTAL AND SOMATIC DYSFUNCTION OF THORACIC REGION: ICD-10-CM

## 2020-01-29 DIAGNOSIS — M99.01 SEGMENTAL AND SOMATIC DYSFUNCTION OF CERVICAL REGION: ICD-10-CM

## 2020-01-29 PROCEDURE — 99201 ZZC OFFICE/OUTPT VISIT, NEW, LEVEL I: CPT | Mod: 25 | Performed by: CHIROPRACTOR

## 2020-01-29 PROCEDURE — 98941 CHIROPRACT MANJ 3-4 REGIONS: CPT | Mod: AT | Performed by: CHIROPRACTOR

## 2020-01-29 NOTE — PROGRESS NOTES
Subjective Finding:    Chief compalint: Patient presents with:  Back Pain  Neck Pain  , Pain Scale: 6/10, Intensity: sharp, Duration: 2 months, Radiating:  no.    Date of injury:     Activities that the pain restricts:   Home/household/hobbies/social activities: yes.  Work duties: no.  Sleep: no.  Makes symptoms better: rest.  Makes symptoms worse: activity.  Have you seen anyone else for the symptoms? No.  Work related: no.  Automobile related injury: no.    Objective and Assessment:    Posture Analysis:   High shoulder: .  Head tilt: .  High iliac crest: .  Head carriage: neutral.  Thoracic Kyphosis: neutral.  Lumbar Lordosis: forward.    Lumbar Range of Motion: extension decreased.  Cervical Range of Motion: extension decreased.  Thoracic Range of Motion: extension decreased.  Extremity Range of Motion: .    Palpation:   Quad lumb: bilateral, referred pain: no  Lev scapulae: stiff, referred pain: no    Segmental dysfunction pre-treatment and treatment area: C2, C3, T4, L5 and Sacrum.    Assessment post-treatment:  Cervical: ROM increased.  Thoracic: ROM increased.  Lumbar: ROM increased.    Comments: .      Complicating Factors: .    Procedure(s):  [facility]:  [id number] Chiropractic manipulative treatment 3-4 regions performed   Cervical: Diversified, See above for level, Supine, Thoracic: Diversified, See above for level, Prone and Lumbar: Diversified, See above for level, Side posture    Modalities:  None performed this visit    Therapeutic procedures:  None    Plan:  Treatment plan: PRN.  Instructed patient: stretch as instructed at visit.  Short term goals: increase ROM.  Long term goals: restore normal function.  Prognosis: very good.

## 2020-01-31 ENCOUNTER — OFFICE VISIT (OUTPATIENT)
Dept: OBGYN | Facility: OTHER | Age: 45
End: 2020-01-31
Attending: OBSTETRICS & GYNECOLOGY
Payer: COMMERCIAL

## 2020-01-31 VITALS
SYSTOLIC BLOOD PRESSURE: 128 MMHG | HEIGHT: 64 IN | WEIGHT: 194 LBS | DIASTOLIC BLOOD PRESSURE: 70 MMHG | BODY MASS INDEX: 33.12 KG/M2 | HEART RATE: 80 BPM

## 2020-01-31 DIAGNOSIS — Z98.890 S/P ENDOMETRIAL ABLATION: ICD-10-CM

## 2020-01-31 DIAGNOSIS — N80.03 ADENOMYOSIS: ICD-10-CM

## 2020-01-31 DIAGNOSIS — R10.2 PELVIC PAIN IN FEMALE: Primary | ICD-10-CM

## 2020-01-31 DIAGNOSIS — N93.9 VAGINAL BLEEDING: ICD-10-CM

## 2020-01-31 DIAGNOSIS — N85.7 HEMATOMETRA: ICD-10-CM

## 2020-01-31 LAB
SPECIMEN SOURCE: NORMAL
WET PREP SPEC: NORMAL

## 2020-01-31 PROCEDURE — G0463 HOSPITAL OUTPT CLINIC VISIT: HCPCS | Mod: 25

## 2020-01-31 PROCEDURE — 99215 OFFICE O/P EST HI 40 MIN: CPT | Performed by: OBSTETRICS & GYNECOLOGY

## 2020-01-31 PROCEDURE — G0463 HOSPITAL OUTPT CLINIC VISIT: HCPCS

## 2020-01-31 PROCEDURE — 87210 SMEAR WET MOUNT SALINE/INK: CPT | Mod: ZL | Performed by: OBSTETRICS & GYNECOLOGY

## 2020-01-31 ASSESSMENT — MIFFLIN-ST. JEOR: SCORE: 1504.98

## 2020-01-31 ASSESSMENT — PAIN SCALES - GENERAL: PAINLEVEL: MODERATE PAIN (5)

## 2020-01-31 NOTE — NURSING NOTE
"Chief Complaint   Patient presents with     Consult     Consult from Dr Lewis for abnormal uterine bleeding       Initial /70 (BP Location: Left arm, Patient Position: Sitting, Cuff Size: Adult Large)   Pulse 80   Ht 1.626 m (5' 4\")   Wt 88 kg (194 lb)   Breastfeeding No   BMI 33.30 kg/m   Estimated body mass index is 33.3 kg/m  as calculated from the following:    Height as of this encounter: 1.626 m (5' 4\").    Weight as of this encounter: 88 kg (194 lb).  Medication Reconciliation: complete  MADELAINE MEDINA LPN  "

## 2020-02-02 ASSESSMENT — ENCOUNTER SYMPTOMS
CHILLS: 0
ABDOMINAL PAIN: 1
DIZZINESS: 0
DIARRHEA: 0
DYSURIA: 0
NAUSEA: 0
FREQUENCY: 0
VOMITING: 0
LIGHT-HEADEDNESS: 0
DIFFICULTY URINATING: 0
CONSTIPATION: 0
HEADACHES: 0
HEMATURIA: 0
SHORTNESS OF BREATH: 0
FEVER: 0

## 2020-02-02 NOTE — PROGRESS NOTES
CC:  Consult from Dr. Lewis for Pelvic pain/AUB    HPI:  Lashae Middleton is a 44 year old female is a   P4 (vag).  No LMP recorded. Patient has had an ablation. Menses were absent until recent episode of VB with associated pelvic pain 1 month ago.  She has had some light bleeding/old blood since.  . She describes intermittent pelvic pain for  1  years.  Location: both sides.  Sharp, shooting, but daily for last 4 weeks. Lasts a few hours.  H/o dyspareunia (not currently sexually active).  H/o dysmenorrhea.    Aggravating factors activity  Alleviating factors: none  Radiation yes  Prior treatment/tests yes  Pelvic US:    Study Result     US PELVIC COMPLETE WITH TRANSVAGINAL     HISTORY: 44 years Female with pelvic pain. Patient has history of  ablation 2 years ago.     TECHNIQUE: Transvaginal grayscale and color duplex ultrasonography of  the pelvis was performed.     COMPARISON: 1/3/2020     FINDINGS:     The uterus measures 8.2 x 5.2 x 5.8 cm.     Endometrium: There is an elliptical form cyst in the lower uterine  segment now measuring 1.7 x 0.7 x 1.5 cm. This is larger than that  seen on the prior study. Some slightly echogenic debris is visible  within this cyst. Previously this measured 2.1 x 2.2 x 1.7 cm     The right ovary is not visualized     The left ovary is not visualized     Myometrial tissue has some posterior acoustical shadowing close to the  endometrial stripe obscuring visualization of the endometrium.                                                                      IMPRESSION: There is a mildly complex cyst within the lower uterine  segment, possibly within the endocervical canal. This has decreased in  size since the prior study.            Current contraception BTO    Patients records are available and reviewed at today's visit.    Past GYN history:  No STD history       Last PAP smear:  Normal      Past Medical History:   Diagnosis Date     Abnormal uterine bleeding (AUB)    IBS    Past  Surgical History:   Procedure Laterality Date     HC ABLATION, ENDOMETRIAL, THERMAL, W/O HYSTEROSCOPIC GUIDANCE       TUBAL LIGATION  2011       Family History   Problem Relation Age of Onset     Other - See Comments Mother         endometriosis       Allergies: Sulfa drugs; Latex; No clinical screening - see comments; Penicillins; Aspirin; and Sumatriptan    Current Outpatient Medications   Medication Sig Dispense Refill     albuterol (PROAIR HFA/PROVENTIL HFA/VENTOLIN HFA) 108 (90 Base) MCG/ACT inhaler Inhale 2 puffs into the lungs every 6 hours as needed for shortness of breath / dyspnea or wheezing 1 Inhaler 3     benzonatate (TESSALON) 200 MG capsule Take 1 capsule (200 mg) by mouth 3 times daily as needed for cough 60 capsule 1     buPROPion (WELLBUTRIN XL) 150 MG 24 hr tablet TAKE 1 TABLET BY MOUTH ONCE DAILY IN THE MORNING 30 tablet 0     cyclobenzaprine (FLEXERIL) 5 MG tablet TAKE 1 TO 2 TABLETS BY MOUTH THREE TIMES DAILY AS NEEDED FOR MUSCLE SPASMS 60 tablet 1     EPINEPHrine (EPIPEN/ADRENACLICK/OR ANY BX GENERIC EQUIV) 0.3 MG/0.3ML injection 2-pack Inject 0.3 mLs (0.3 mg) into the muscle as needed for anaphylaxis 0.6 mL 0     fluticasone (FLONASE) 50 MCG/ACT spray Spray 1-2 sprays into both nostrils daily 1 Bottle 11     ibuprofen (ADVIL/MOTRIN) 800 MG tablet Take 1 tablet (800 mg) by mouth every 8 hours as needed for moderate pain 60 tablet 0     ondansetron (ZOFRAN-ODT) 4 MG ODT tab Take 1 tablet (4 mg) by mouth every 8 hours as needed for nausea 30 tablet 0     pantoprazole (PROTONIX) 20 MG EC tablet TAKE 1 TABLET BY MOUTH ONCE DAILY 30-60  MINUTES  BEFORE  A  MEAL 60 tablet 0     rizatriptan (MAXALT) 5 MG tablet Take 1-2 tablets (5-10 mg) by mouth at onset of headache for migraine May repeat in 2 hours. Max 6 tablets/24 hours. 30 tablet 0         ROS:  CONSTITUTIONAL: NEGATIVE for fever, chills, change in weight  GI: NEGATIVE for nausea, abdominal pain, heartburn, or change in bowel habits  :  "NEGATIVE for frequency, dysuria, hematuria, vaginal discharge  + odor  PSYCHIATRIC: NEGATIVE for changes in mood or affect    EXAM:  Blood pressure 128/70, pulse 80, height 1.626 m (5' 4\"), weight 88 kg (194 lb), not currently breastfeeding.   BMI= Body mass index is 33.3 kg/m .  General - pleasant female in no acute distress.   Neurological -  mental status normal.  Alert and oriented.  Abdomen - soft, nontender, nondistended, no hepatosplenomegaly.  Pelvic - EG: normal adult female, BUS: within normal limits, Vagina: without lesions or  discharge, Cervix: no lesions, + CMT, Uterus: firm, normal size, contour, and tender.   Adnexae: no masses , mil TTP  Anus without lesions  Rectovaginal - deferred.  Ext:  No edema  Neurological -  mental status normal.  Alert and oriented.  Pap smear done:  No  Pap UTD nml 2018  Cervical cultures:No  Done in ED and neg  Wet prep done, negative      ASSESSMENT/PLAN:   Pelvic pain, abnormal uterine bleeding.  Previous ablation with possible hematometra on US, adenomyosis and symptoms suggestive for endometriosis.     Discussed expectant,  medical,  and and surgical options(/hysterectomy). Patient would like to proceed with hysterectomy.  Recommend LAVH, possible BSO if diseased or significant endometriosis.  R/B of oophorectomy discussed.    She will think about her options and call to schedule.   Pt has my card and phone number to call as needed if problems in the interim or she does not hear her results.  Reviewed goals, risks, alternatives for planned procedure.  Including risk of bleeding, infection, damage to nerves, blood vessels, bowel and bladder. Discussed recovery period and expected discomfort.. All questions were answered.  Preoperative instructions discussed. 40 minutes were spent with the patient with greater than 50% of the visit spent in face-to-face counseling and coordination of care. Pt has my card and phone number to call to schedule or if she does not hear her " results.                  Tirso Gomez MD

## 2020-02-10 ENCOUNTER — OFFICE VISIT (OUTPATIENT)
Dept: CHIROPRACTIC MEDICINE | Facility: OTHER | Age: 45
End: 2020-02-10
Attending: CHIROPRACTOR
Payer: COMMERCIAL

## 2020-02-10 ENCOUNTER — MYC MEDICAL ADVICE (OUTPATIENT)
Dept: OBGYN | Facility: OTHER | Age: 45
End: 2020-02-10

## 2020-02-10 DIAGNOSIS — M54.50 ACUTE BILATERAL LOW BACK PAIN WITHOUT SCIATICA: ICD-10-CM

## 2020-02-10 DIAGNOSIS — M99.02 SEGMENTAL AND SOMATIC DYSFUNCTION OF THORACIC REGION: ICD-10-CM

## 2020-02-10 DIAGNOSIS — M99.03 SEGMENTAL AND SOMATIC DYSFUNCTION OF LUMBAR REGION: Primary | ICD-10-CM

## 2020-02-10 DIAGNOSIS — M99.01 SEGMENTAL AND SOMATIC DYSFUNCTION OF CERVICAL REGION: ICD-10-CM

## 2020-02-10 PROCEDURE — 98941 CHIROPRACT MANJ 3-4 REGIONS: CPT | Mod: AT | Performed by: CHIROPRACTOR

## 2020-02-10 NOTE — PROGRESS NOTES
Subjective Finding:    Chief compalint: Patient presents with:  Back Pain  Neck Pain  , Pain Scale: 6/10, Intensity: sharp, Duration: 2 months, Radiating:  no.    Date of injury:     Activities that the pain restricts:   Home/household/hobbies/social activities: yes.  Work duties: no.  Sleep: no.  Makes symptoms better: rest.  Makes symptoms worse: activity.  Have you seen anyone else for the symptoms? No.  Work related: no.  Automobile related injury: no.    Objective and Assessment:    Posture Analysis:   High shoulder: .  Head tilt: .  High iliac crest: .  Head carriage: neutral.  Thoracic Kyphosis: neutral.  Lumbar Lordosis: forward.    Lumbar Range of Motion: extension decreased.  Cervical Range of Motion: extension decreased.  Thoracic Range of Motion: extension decreased.  Extremity Range of Motion: .    Palpation:   Quad lumb: bilateral, referred pain: no  Lev scapulae: stiff, referred pain: no    Segmental dysfunction pre-treatment and treatment area: C2, C3, T4, L5 and Sacrum.    Assessment post-treatment:  Cervical: ROM increased.  Thoracic: ROM increased.  Lumbar: ROM increased.    Comments: .      Complicating Factors: .    Procedure(s):  [facility]:  87760 Chiropractic manipulative treatment 3-4 regions performed   Cervical: Diversified, See above for level, Supine, Thoracic: Diversified, See above for level, Prone and Lumbar: Diversified, See above for level, Side posture    Modalities:  None performed this visit    Therapeutic procedures:  None    Plan:  Treatment plan: PRN.  Instructed patient: stretch as instructed at visit.  Short term goals: increase ROM.  Long term goals: restore normal function.  Prognosis: very good.

## 2020-02-11 ENCOUNTER — MYC MEDICAL ADVICE (OUTPATIENT)
Dept: OBGYN | Facility: OTHER | Age: 45
End: 2020-02-11

## 2020-02-16 DIAGNOSIS — G43.909 MIGRAINE SYNDROME: ICD-10-CM

## 2020-02-17 ENCOUNTER — MYC MEDICAL ADVICE (OUTPATIENT)
Dept: FAMILY MEDICINE | Facility: OTHER | Age: 45
End: 2020-02-17

## 2020-02-17 DIAGNOSIS — J32.9 SINUSITIS, UNSPECIFIED CHRONICITY, UNSPECIFIED LOCATION: ICD-10-CM

## 2020-02-18 RX ORDER — FLUTICASONE PROPIONATE 50 MCG
1-2 SPRAY, SUSPENSION (ML) NASAL DAILY
Qty: 11.1 ML | Refills: 3 | Status: SHIPPED | OUTPATIENT
Start: 2020-02-18 | End: 2020-09-28

## 2020-02-19 ENCOUNTER — MYC MEDICAL ADVICE (OUTPATIENT)
Dept: FAMILY MEDICINE | Facility: OTHER | Age: 45
End: 2020-02-19

## 2020-02-19 RX ORDER — RIZATRIPTAN BENZOATE 5 MG/1
TABLET ORAL
Qty: 30 TABLET | Refills: 1 | Status: SHIPPED | OUTPATIENT
Start: 2020-02-19 | End: 2020-04-03

## 2020-02-20 ENCOUNTER — OFFICE VISIT (OUTPATIENT)
Dept: CHIROPRACTIC MEDICINE | Facility: OTHER | Age: 45
End: 2020-02-20
Attending: CHIROPRACTOR
Payer: COMMERCIAL

## 2020-02-20 DIAGNOSIS — M99.01 SEGMENTAL AND SOMATIC DYSFUNCTION OF CERVICAL REGION: Primary | ICD-10-CM

## 2020-02-20 DIAGNOSIS — M99.03 SEGMENTAL AND SOMATIC DYSFUNCTION OF LUMBAR REGION: ICD-10-CM

## 2020-02-20 DIAGNOSIS — M54.2 CERVICALGIA: ICD-10-CM

## 2020-02-20 DIAGNOSIS — M99.02 SEGMENTAL AND SOMATIC DYSFUNCTION OF THORACIC REGION: ICD-10-CM

## 2020-02-20 PROCEDURE — 98941 CHIROPRACT MANJ 3-4 REGIONS: CPT | Mod: AT | Performed by: CHIROPRACTOR

## 2020-02-20 NOTE — PROGRESS NOTES
Subjective Finding:    Chief compalint: Patient presents with:  Back Pain  Neck Pain  , Pain Scale: 6/10, Intensity: sharp, Duration: 2 months, Radiating:  no.    Date of injury:     Activities that the pain restricts:   Home/household/hobbies/social activities: yes.  Work duties: no.  Sleep: no.  Makes symptoms better: rest.  Makes symptoms worse: activity.  Have you seen anyone else for the symptoms? No.  Work related: no.  Automobile related injury: no.    Objective and Assessment:    Posture Analysis:   High shoulder: .  Head tilt: .  High iliac crest: .  Head carriage: neutral.  Thoracic Kyphosis: neutral.  Lumbar Lordosis: forward.    Lumbar Range of Motion: extension decreased.  Cervical Range of Motion: extension decreased.  Thoracic Range of Motion: extension decreased.  Extremity Range of Motion: .    Palpation:   Quad lumb: bilateral, referred pain: no  Lev scapulae: stiff, referred pain: no    Segmental dysfunction pre-treatment and treatment area: C2, C3, T4, L5 and Sacrum.    Assessment post-treatment:  Cervical: ROM increased.  Thoracic: ROM increased.  Lumbar: ROM increased.    Comments: .      Complicating Factors: .    Procedure(s):  [facility]:  58781 Chiropractic manipulative treatment 3-4 regions performed   Cervical: Diversified, See above for level, Supine, Thoracic: Diversified, See above for level, Prone and Lumbar: Diversified, See above for level, Side posture    Modalities:  None performed this visit    Therapeutic procedures:  None    Plan:  Treatment plan: PRN.  Instructed patient: stretch as instructed at visit.  Short term goals: increase ROM.  Long term goals: restore normal function.  Prognosis: very good.

## 2020-02-21 ENCOUNTER — OFFICE VISIT (OUTPATIENT)
Dept: FAMILY MEDICINE | Facility: OTHER | Age: 45
End: 2020-02-21
Attending: FAMILY MEDICINE
Payer: COMMERCIAL

## 2020-02-21 ENCOUNTER — MYC MEDICAL ADVICE (OUTPATIENT)
Dept: FAMILY MEDICINE | Facility: OTHER | Age: 45
End: 2020-02-21

## 2020-02-21 VITALS
WEIGHT: 203 LBS | HEART RATE: 85 BPM | OXYGEN SATURATION: 98 % | SYSTOLIC BLOOD PRESSURE: 112 MMHG | BODY MASS INDEX: 34.84 KG/M2 | DIASTOLIC BLOOD PRESSURE: 86 MMHG | TEMPERATURE: 98.3 F

## 2020-02-21 DIAGNOSIS — L73.9 FOLLICULITIS: Primary | ICD-10-CM

## 2020-02-21 DIAGNOSIS — R10.13 ABDOMINAL PAIN, EPIGASTRIC: ICD-10-CM

## 2020-02-21 PROCEDURE — G0463 HOSPITAL OUTPT CLINIC VISIT: HCPCS

## 2020-02-21 PROCEDURE — 99213 OFFICE O/P EST LOW 20 MIN: CPT | Performed by: FAMILY MEDICINE

## 2020-02-21 RX ORDER — PREDNISONE 20 MG/1
TABLET ORAL
Qty: 7 TABLET | Refills: 0 | Status: SHIPPED | OUTPATIENT
Start: 2020-02-21 | End: 2020-04-03

## 2020-02-21 RX ORDER — PANTOPRAZOLE SODIUM 20 MG/1
TABLET, DELAYED RELEASE ORAL
Qty: 60 TABLET | Refills: 0 | Status: SHIPPED | OUTPATIENT
Start: 2020-02-21 | End: 2020-11-10

## 2020-02-21 RX ORDER — CEPHALEXIN 500 MG/1
500 CAPSULE ORAL 4 TIMES DAILY
Qty: 28 CAPSULE | Refills: 0 | Status: SHIPPED | OUTPATIENT
Start: 2020-02-21 | End: 2020-04-03

## 2020-02-21 ASSESSMENT — ANXIETY QUESTIONNAIRES
GAD7 TOTAL SCORE: 13
1. FEELING NERVOUS, ANXIOUS, OR ON EDGE: MORE THAN HALF THE DAYS
3. WORRYING TOO MUCH ABOUT DIFFERENT THINGS: MORE THAN HALF THE DAYS
4. TROUBLE RELAXING: MORE THAN HALF THE DAYS
5. BEING SO RESTLESS THAT IT IS HARD TO SIT STILL: MORE THAN HALF THE DAYS
7. FEELING AFRAID AS IF SOMETHING AWFUL MIGHT HAPPEN: SEVERAL DAYS
6. BECOMING EASILY ANNOYED OR IRRITABLE: MORE THAN HALF THE DAYS
2. NOT BEING ABLE TO STOP OR CONTROL WORRYING: MORE THAN HALF THE DAYS

## 2020-02-21 ASSESSMENT — PATIENT HEALTH QUESTIONNAIRE - PHQ9: SUM OF ALL RESPONSES TO PHQ QUESTIONS 1-9: 14

## 2020-02-21 ASSESSMENT — PAIN SCALES - GENERAL: PAINLEVEL: MILD PAIN (3)

## 2020-02-21 NOTE — LETTER
My Asthma Action Plan    Name: Lashae Middleton   YOB: 1975  Date: 2/19/2020   My doctor: Melyssa Lewis MD   [facility]        My Rescue Medicine:   Albuterol inhaler (Proair/Ventolin/Proventil HFA)  2-4 puffs EVERY 4 HOURS as needed. Use a spacer if recommended by your provider.   My Asthma Severity:   Intermittent / Exercise Induced  Know your asthma triggers: upper respiratory infections  upper respiratory infections          GREEN ZONE   Good Control    I feel good    No cough or wheeze    Can work, sleep and play without asthma symptoms       Take your asthma control medicine every day.     1. If exercise triggers your asthma, take your rescue medication    15 minutes before exercise or sports, and    During exercise if you have asthma symptoms  2. Spacer to use with inhaler: If you have a spacer, make sure to use it with your inhaler             YELLOW ZONE Getting Worse  I have ANY of these:    I do not feel good    Cough or wheeze    Chest feels tight    Wake up at night   1. Keep taking your Green Zone medications  2. Start taking your rescue medicine:    every 20 minutes for up to 1 hour. Then every 4 hours for 24-48 hours.  3. If you stay in the Yellow Zone for more than 12-24 hours, contact your doctor.  4. If you do not return to the Green Zone in 12-24 hours or you get worse, start taking your oral steroid medicine if prescribed by your provider.           RED ZONE Medical Alert - Get Help  I have ANY of these:    I feel awful    Medicine is not helping    Breathing getting harder    Trouble walking or talking    Nose opens wide to breathe       1. Take your rescue medicine NOW  2. If your provider has prescribed an oral steroid medicine, start taking it NOW  3. Call your doctor NOW  4. If you are still in the Red Zone after 20 minutes and you have not reached your doctor:    Take your rescue medicine again and    Call 911 or go to the emergency  room right away    See your regular doctor within 2 weeks of an Emergency Room or Urgent Care visit for follow-up treatment.          Annual Reminders:  Meet with Asthma Educator,  Flu Shot in the Fall, consider Pneumonia Vaccination for patients with asthma (aged 19 and older).    Pharmacy: [id number]  [place], [place] - [address]    Electronically signed by Melyssa Lewis MD   Date: 02/19/20                    Asthma Triggers  How To Control Things That Make Your Asthma Worse    Triggers are things that make your asthma worse.  Look at the list below to help you find your triggers and   what you can do about them. You can help prevent asthma flare-ups by staying away from your triggers.      Trigger                                                          What you can do   Cigarette Smoke  Tobacco smoke can make asthma worse. Do not allow smoking in your home, car or around you.  Be sure no one smokes at a child s day care or school.  If you smoke, ask your health care provider for ways to help you quit.  Ask family members to quit too.  Ask your health care provider for a referral to Quit Plan to help you quit smoking, or call 0-181-765-PLAN.     Colds, Flu, Bronchitis  These are common triggers of asthma. Wash your hands often.  Don t touch your eyes, nose or mouth.  Get a flu shot every year.     Dust Mites  These are tiny bugs that live in cloth or carpet. They are too small to see. Wash sheets and blankets in hot water every week.   Encase pillows and mattress in dust mite proof covers.  Avoid having carpet if you can. If you have carpet, vacuum weekly.   Use a dust mask and HEPA vacuum.   Pollen and Outdoor Mold  Some people are allergic to trees, grass, or weed pollen, or molds. Try to keep your windows closed.  Limit time out doors when pollen count is high.   Ask you health care provider about taking medicine during allergy season.     Animal Dander  Some people are allergic to skin flakes,  urine or saliva from pets with fur or feathers. Keep pets with fur or feathers out of your home.    If you can t keep the pet outdoors, then keep the pet out of your bedroom.  Keep the bedroom door closed.  Keep pets off cloth furniture and away from stuffed toys.     Mice, Rats, and Cockroaches  Some people are allergic to the waste from these pests.   Cover food and garbage.  Clean up spills and food crumbs.  Store grease in the refrigerator.   Keep food out of the bedroom.   Indoor Mold  This can be a trigger if your home has high moisture. Fix leaking faucets, pipes, or other sources of water.   Clean moldy surfaces.  Dehumidify basement if it is damp and smelly.   Smoke, Strong Odors, and Sprays  These can reduce air quality. Stay away from strong odors and sprays, such as perfume, powder, hair spray, paints, smoke incense, paint, cleaning products, candles and new carpet.   Exercise or Sports  Some people with asthma have this trigger. Be active!  Ask your doctor about taking medicine before sports or exercise to prevent symptoms.    Warm up for 5-10 minutes before and after sports or exercise.     Other Triggers of Asthma  Cold air:  Cover your nose and mouth with a scarf.  Sometimes laughing or crying can be a trigger.  Some medicines and food can trigger asthma.

## 2020-02-21 NOTE — NURSING NOTE
"Chief Complaint   Patient presents with     Derm Problem       Initial /86   Pulse 85   Temp 98.3  F (36.8  C) (Tympanic)   Wt 92.1 kg (203 lb)   SpO2 98%   BMI 34.84 kg/m   Estimated body mass index is 34.84 kg/m  as calculated from the following:    Height as of 1/31/20: 1.626 m (5' 4\").    Weight as of this encounter: 92.1 kg (203 lb).  Medication Reconciliation: complete  Candi Muir LPN  "

## 2020-02-21 NOTE — PATIENT INSTRUCTIONS
Patient Education     Understanding Folliculitis  Folliculitis is when hair follicles become inflamed. Follicles are the tiny holes from which hair grows out of your skin. This skin condition can occur any place on the body where hair grows. But it s often found on the neck, face, and scalp.  How to say it  [id number]   What causes folliculitis?  An infection or irritation can cause this skin condition. It may be from bacteria or a fungus. The condition can also happen from a wound or irritation of the skin. Shaving is a common cause. Some cases may come from taking certain medicines, such as those that treat acne.  Symptoms of folliculitis  This skin condition tends to develop quickly. It looks like little pimples on a base of a red, inflamed hair follicles. These bumps may ooze pus. They may also be:    Itchy    Painful    Red    Swollen  Treatment for folliculitis  Treatment depends on the cause of the inflammation. In some cases, this skin condition will go away on its own in a few days. But it may return. Treatment options include:    Warm compress. Putting a warm, wet washcloth on the inflamed skin may help.    Medicine. Many skin (topical) and oral medicines are available. Antibiotics are used for bacterial infections. Antifungal medicines are best for fungal infections.    Good hygiene. Keeping the skin clean can help. Use a clean razor when shaving. Prevent ingrown hairs after shaving. This can reduce folliculitis in the beard area. Avoid any substances that bother your skin.  When to call your healthcare provider  Call your healthcare provider right away if you have any of these:    Fever of 100.4 F (38 C) or higher, or as directed    Pain that gets worse    Symptoms that don t get better, or get worse    New symptoms   Date Last Reviewed: 5/1/2016 2000-2019 [employer]. [address] [place] [address], [place] [address]. All rights reserved. This information is not intended as a  substitute for professional medical care. Always follow your healthcare professional's instructions.

## 2020-02-21 NOTE — PROGRESS NOTES
"Subjective     Lashae Middleton is a 44 year old female who presents to clinic today for the following health issues:    HPI     Rash      Duration: about 4 days    Description  Location: \"all over body\"  Itching: no    Intensity:  severe    Accompanying signs and symptoms: pain    History (similar episodes/previous evaluation): 2017- was given benadryl and hydrocortisone cream before and told it was heat rash    Precipitating or alleviating factors:  New exposures:  None - did get a new mattress before Christmas   Recent travel: no      Therapies tried and outcome: hydrocortisone cream -  not effective and Benadryl/diphenhydramine -  not effective    Reviewed and updated as needed this visit by Provider  Tobacco  Allergies  Meds  Problems  Med Hx  Surg Hx  Fam Hx         Review of Systems   ROS COMP: Constitutional, HEENT, cardiovascular, pulmonary, gi and gu systems are negative, except as otherwise noted.      Objective    /86   Pulse 85   Temp 98.3  F (36.8  C) (Tympanic)   Wt 92.1 kg (203 lb)   SpO2 98%   BMI 34.84 kg/m    Body mass index is 34.84 kg/m .  Physical Exam   GENERAL: healthy, alert and no distress  RESP: lungs clear to auscultation - no rales, rhonchi or wheezes  CV: regular rates and rhythm, normal S1 S2, no S3 or S4 and no murmur, click or rub  SKIN: erythematous papules under bra area, upper back and shoulders.     Diagnostic Test Results:  Labs reviewed in Epic  No results found for this or any previous visit (from the past 24 hour(s)).  No results found for any visits on 02/21/20.        Assessment & Plan     Folliculitis  Prednisone and keflex perscribed. If not improving over the next 72 hrs, call for re evaluation.   - cephALEXin 500 MG PO capsule; Take 1 capsule (500 mg) by mouth 4 times daily for 7 days  Dispense: 28 capsule; Refill: 0  - predniSONE 20 MG PO tablet; Take 3 tabs by mouth daily x 1 days, then 2 tabs daily x 1 days, then 1 tab daily x 1 days, then 1/2 tab " daily x 2 days.  Dispense: 7 tablet; Refill: 0    Abdominal pain, epigastric  Refilled.   - pantoprazole 20 MG PO EC tablet; TAKE 1 TABLET BY MOUTH ONCE DAILY 30-60  MINUTES  BEFORE  A  MEAL  Dispense: 60 tablet; Refill: 0      No follow-ups on file.    Melyssa Lewis MD  [facility]

## 2020-02-22 ASSESSMENT — ANXIETY QUESTIONNAIRES: GAD7 TOTAL SCORE: 13

## 2020-02-23 NOTE — TELEPHONE ENCOUNTER
Recommend continuing with non narcotic pain medications at this time for the pelvic pain.   Melyssa Lewis MD

## 2020-02-24 ENCOUNTER — MYC MEDICAL ADVICE (OUTPATIENT)
Dept: FAMILY MEDICINE | Facility: OTHER | Age: 45
End: 2020-02-24

## 2020-03-10 ENCOUNTER — MYC MEDICAL ADVICE (OUTPATIENT)
Dept: FAMILY MEDICINE | Facility: OTHER | Age: 45
End: 2020-03-10

## 2020-03-10 DIAGNOSIS — Z72.0 TOBACCO ABUSE: Primary | ICD-10-CM

## 2020-03-11 ENCOUNTER — HEALTH MAINTENANCE LETTER (OUTPATIENT)
Age: 45
End: 2020-03-11

## 2020-03-23 ENCOUNTER — TELEPHONE (OUTPATIENT)
Dept: FAMILY MEDICINE | Facility: OTHER | Age: 45
End: 2020-03-23

## 2020-03-23 ENCOUNTER — MYC MEDICAL ADVICE (OUTPATIENT)
Dept: FAMILY MEDICINE | Facility: OTHER | Age: 45
End: 2020-03-23

## 2020-03-23 NOTE — LETTER
To whom it may concern,         Lashae Middleton (: 1975) is a patient at our clinic. She has significant pain and pelvic dysfunction with lifting. Recommend lifting restrictions for patient (< 10 lbs) at this time. Please contact our office for further information or concerns.                 Melyssa Lewis MD

## 2020-03-23 NOTE — TELEPHONE ENCOUNTER
LM notifying patient letter is complete. Advised to return call to writer directly for further instruction on what she would like done with it.

## 2020-04-03 ENCOUNTER — VIRTUAL VISIT (OUTPATIENT)
Dept: FAMILY MEDICINE | Facility: OTHER | Age: 45
End: 2020-04-03
Attending: FAMILY MEDICINE
Payer: COMMERCIAL

## 2020-04-03 VITALS — WEIGHT: 210 LBS | BODY MASS INDEX: 36.05 KG/M2

## 2020-04-03 DIAGNOSIS — R10.2 PELVIC PAIN IN FEMALE: Primary | ICD-10-CM

## 2020-04-03 DIAGNOSIS — R21 RASH AND NONSPECIFIC SKIN ERUPTION: ICD-10-CM

## 2020-04-03 DIAGNOSIS — Z72.0 TOBACCO ABUSE: ICD-10-CM

## 2020-04-03 DIAGNOSIS — G43.909 MIGRAINE SYNDROME: ICD-10-CM

## 2020-04-03 PROCEDURE — G0463 HOSPITAL OUTPT CLINIC VISIT: HCPCS | Mod: TEL

## 2020-04-03 PROCEDURE — 99442 ZZC PHYSICIAN TELEPHONE EVALUATION 11-20 MIN: CPT | Performed by: FAMILY MEDICINE

## 2020-04-03 RX ORDER — RIZATRIPTAN BENZOATE 5 MG/1
TABLET ORAL
Qty: 30 TABLET | Refills: 1 | Status: SHIPPED | OUTPATIENT
Start: 2020-04-03 | End: 2020-09-04

## 2020-04-03 RX ORDER — HYDROCODONE BITARTRATE AND ACETAMINOPHEN 5; 325 MG/1; MG/1
1 TABLET ORAL EVERY 6 HOURS PRN
Qty: 30 TABLET | Refills: 0 | Status: SHIPPED | OUTPATIENT
Start: 2020-04-03 | End: 2020-04-06

## 2020-04-03 ASSESSMENT — ANXIETY QUESTIONNAIRES
3. WORRYING TOO MUCH ABOUT DIFFERENT THINGS: MORE THAN HALF THE DAYS
1. FEELING NERVOUS, ANXIOUS, OR ON EDGE: MORE THAN HALF THE DAYS
5. BEING SO RESTLESS THAT IT IS HARD TO SIT STILL: NOT AT ALL
6. BECOMING EASILY ANNOYED OR IRRITABLE: MORE THAN HALF THE DAYS
7. FEELING AFRAID AS IF SOMETHING AWFUL MIGHT HAPPEN: NOT AT ALL
GAD7 TOTAL SCORE: 10
2. NOT BEING ABLE TO STOP OR CONTROL WORRYING: MORE THAN HALF THE DAYS

## 2020-04-03 ASSESSMENT — PAIN SCALES - GENERAL: PAINLEVEL: MODERATE PAIN (4)

## 2020-04-03 ASSESSMENT — PATIENT HEALTH QUESTIONNAIRE - PHQ9
5. POOR APPETITE OR OVEREATING: MORE THAN HALF THE DAYS
SUM OF ALL RESPONSES TO PHQ QUESTIONS 1-9: 14

## 2020-04-03 NOTE — NURSING NOTE
"Chief Complaint   Patient presents with     Headache       Initial Wt 95.3 kg (210 lb)   BMI 36.05 kg/m   Estimated body mass index is 36.05 kg/m  as calculated from the following:    Height as of 1/31/20: 1.626 m (5' 4\").    Weight as of this encounter: 95.3 kg (210 lb).  Medication Reconciliation: complete  Elie Harkins LPN  "

## 2020-04-03 NOTE — PROGRESS NOTES
"Subjective     Lashae Middleton is a 44 year old female who is being evaluated via a billable telephone visit.      The patient has been notified of following:     \"This telephone visit will be conducted via a call between you and your physician/provider. We have found that certain health care needs can be provided without the need for a physical exam.  This service lets us provide the care you need with a short phone conversation.  If a prescription is necessary we can send it directly to your pharmacy.  If lab work is needed we can place an order for that and you can then stop by our lab to have the test done at a later time.    If during the course of the call the physician/provider feels a telephone visit is not appropriate, you will not be charged for this service.\"      Patient has given verbal consent for Telephone visit?  Yes    Lashae Middleton complains of   Chief Complaint   Patient presents with     Headache       ALLERGIES  Sulfa drugs; Latex; No clinical screening - see comments; Penicillins; Aspirin; and Sumatriptan      Abdominal Pain      Duration: 1.5 years    Description (location/character/radiation): bilateral, still bleeding, cramping, stabbing, sharp       Associated flank pain: YES      Intensity:  moderate, severe    Accompanying signs and symptoms:        Fever/Chills: no        Gas/Bloating: no        Nausea/vomitting: no        Diarrhea: YES- due to bowel issues       Dysuria or Hematuria: no     History (previous similar pain/trauma/previous testing): no happened after ablasion    Precipitating or alleviating factors:       Pain worse with eating/BM/urination: no, only when at work standing on her feet for 8 hours       Pain relieved by BM: no     Therapies tried and outcome: tylenol, ibuprofen, heat, ice and nothing seems to help     Ongoing pain, worsening lately. Increased vaginal bleeding. Pain is worse when standing for long periods of time at work. Requesting something other than " tylenol and ibuprofen for pain in the short term until surgery date in July.     Rash      Duration: 4 days    Description  Location: arms, back and under breast  Itching: no    Intensity:  mild    Accompanying signs and symptoms: coughing and congested    History (similar episodes/previous evaluation): feburary 2020    Precipitating or alleviating factors:  New exposures:  None  Recent travel: no      Therapies tried and outcome: prednisone, bactroban, cephalexin    Improved with abx previously when diagnosed with folliculitis. Very mild currently. Wondering what to do to keep from spreading.     Headaches      Duration: For years- last migraine on 3/29/2020    Description (location/character/radiation): sick to stomach, pounding    Intensity:  moderate, severe    Accompanying signs and symptoms: upset stomach and hurts to move head    History (similar episodes/previous evaluation): Yes    Precipitating or alleviating factors: Rizatriptan    Therapies tried and outcome: Rizatriptan    Stable, maxalt is helpful. She has no new neurologic symptoms.       Pt also requesting refill of Chantix, did not get this when filled over the phone. She is motivated to quit smoking due to concerns about COVID 19 at this time.     Reviewed and updated as needed this visit by Provider  Tobacco  Allergies  Meds  Problems  Med Hx  Surg Hx  Fam Hx         Review of Systems   ROS COMP: Constitutional, HEENT, cardiovascular, pulmonary, gi and gu systems are negative, except as otherwise noted.         Assessment/Plan:      Pelvic pain in female  Chronic, lower abdomen with AUB, possible endometriosis. Seen by OB/Gyn plan is for hysterectomy. Given #30 norco for severe pain to allow her to continue to work. No refills over the phone.   - HYDROcodone-acetaminophen (NORCO) 5-325 MG tablet; Take 1 tablet by mouth every 6 hours as needed for severe pain  Dispense: 30 tablet; Refill: 0    Rash and nonspecific skin eruption  Very mild  recurrence of papular rash. Discussed good hygiene, emollients, may use topical bactroban in areas. Follow up worsening.     Migraine syndrome  Stable.   - rizatriptan (MAXALT) 5 MG tablet; TAKE 1 TO 2 TABLETS BY MOUTH AT ONSET OF HEADACHE FOR MIGRAINE. MAY REPEAT IN 2 HOURS. MAX OF 6 TABLETS IN 24 HOURS  Dispense: 30 tablet; Refill: 1    Tobacco abuse  Cessation encouraged, pt motivated to quit but has not had much success in the past.   - varenicline (CHANTIX CECILE) 0.5 MG X 11 & 1 MG X 42 tablet; Take 0.5 mg tab daily for 3 days, THEN 0.5 mg tab twice daily for 4 days, THEN 1 mg twice daily.  Dispense: 53 tablet; Refill: 0      Return in about 3 months (around 7/3/2020) for Migraine, Chronic Pain.      Phone call duration:  14 minutes    Melyssa Lewis MD

## 2020-04-04 ASSESSMENT — ANXIETY QUESTIONNAIRES: GAD7 TOTAL SCORE: 10

## 2020-04-08 ENCOUNTER — MYC MEDICAL ADVICE (OUTPATIENT)
Dept: FAMILY MEDICINE | Facility: OTHER | Age: 45
End: 2020-04-08

## 2020-04-08 NOTE — LETTER
"My Asthma Action Plan    Name: Lashae Middleton   YOB: 1975  Date: 5/13/2020   My doctor: Melyssa Lewis MD   [facility]        My Rescue Medicine:   Albuterol inhaler (Proair/Ventolin/Proventil HFA)  2-4 puffs EVERY 4 HOURS as needed. Use a spacer if recommended by your provider.   My Asthma Severity:   { :295812::\"Intermittent / Exercise Induced\"}  Know your asthma triggers: { :625748}  upper respiratory infections          GREEN ZONE   Good Control    I feel good    No cough or wheeze    Can work, sleep and play without asthma symptoms       Take your asthma control medicine every day.     1. If exercise triggers your asthma, take your rescue medication    15 minutes before exercise or sports, and    During exercise if you have asthma symptoms  2. Spacer to use with inhaler: If you have a spacer, make sure to use it with your inhaler             YELLOW ZONE Getting Worse  I have ANY of these:    I do not feel good    Cough or wheeze    Chest feels tight    Wake up at night   1. Keep taking your Green Zone medications  2. Start taking your rescue medicine:    every 20 minutes for up to 1 hour. Then every 4 hours for 24-48 hours.  3. If you stay in the Yellow Zone for more than 12-24 hours, contact your doctor.  4. If you do not return to the Green Zone in 12-24 hours or you get worse, start taking your oral steroid medicine if prescribed by your provider.           RED ZONE Medical Alert - Get Help  I have ANY of these:    I feel awful    Medicine is not helping    Breathing getting harder    Trouble walking or talking    Nose opens wide to breathe       1. Take your rescue medicine NOW  2. If your provider has prescribed an oral steroid medicine, start taking it NOW  3. Call your doctor NOW  4. If you are still in the Red Zone after 20 minutes and you have not reached your doctor:    Take your rescue medicine again and    Call 911 or go to the emergency room " right away    See your regular doctor within 2 weeks of an Emergency Room or Urgent Care visit for follow-up treatment.          Annual Reminders:  Meet with Asthma Educator,  Flu Shot in the Fall, consider Pneumonia Vaccination for patients with asthma (aged 19 and older).    Pharmacy: [id number]  [place], [place] - [address]    Electronically signed by Melyssa Lewis MD   Date: 05/13/20                    Asthma Triggers  How To Control Things That Make Your Asthma Worse    Triggers are things that make your asthma worse.  Look at the list below to help you find your triggers and   what you can do about them. You can help prevent asthma flare-ups by staying away from your triggers.      Trigger                                                          What you can do   Cigarette Smoke  Tobacco smoke can make asthma worse. Do not allow smoking in your home, car or around you.  Be sure no one smokes at a child s day care or school.  If you smoke, ask your health care provider for ways to help you quit.  Ask family members to quit too.  Ask your health care provider for a referral to Quit Plan to help you quit smoking, or call 7-963-673-PLAN.     Colds, Flu, Bronchitis  These are common triggers of asthma. Wash your hands often.  Don t touch your eyes, nose or mouth.  Get a flu shot every year.     Dust Mites  These are tiny bugs that live in cloth or carpet. They are too small to see. Wash sheets and blankets in hot water every week.   Encase pillows and mattress in dust mite proof covers.  Avoid having carpet if you can. If you have carpet, vacuum weekly.   Use a dust mask and HEPA vacuum.   Pollen and Outdoor Mold  Some people are allergic to trees, grass, or weed pollen, or molds. Try to keep your windows closed.  Limit time out doors when pollen count is high.   Ask you health care provider about taking medicine during allergy season.     Animal Dander  Some people are allergic to skin flakes, urine  or saliva from pets with fur or feathers. Keep pets with fur or feathers out of your home.    If you can t keep the pet outdoors, then keep the pet out of your bedroom.  Keep the bedroom door closed.  Keep pets off cloth furniture and away from stuffed toys.     Mice, Rats, and Cockroaches  Some people are allergic to the waste from these pests.   Cover food and garbage.  Clean up spills and food crumbs.  Store grease in the refrigerator.   Keep food out of the bedroom.   Indoor Mold  This can be a trigger if your home has high moisture. Fix leaking faucets, pipes, or other sources of water.   Clean moldy surfaces.  Dehumidify basement if it is damp and smelly.   Smoke, Strong Odors, and Sprays  These can reduce air quality. Stay away from strong odors and sprays, such as perfume, powder, hair spray, paints, smoke incense, paint, cleaning products, candles and new carpet.   Exercise or Sports  Some people with asthma have this trigger. Be active!  Ask your doctor about taking medicine before sports or exercise to prevent symptoms.    Warm up for 5-10 minutes before and after sports or exercise.     Other Triggers of Asthma  Cold air:  Cover your nose and mouth with a scarf.  Sometimes laughing or crying can be a trigger.  Some medicines and food can trigger asthma.

## 2020-04-10 ENCOUNTER — MYC MEDICAL ADVICE (OUTPATIENT)
Dept: OBGYN | Facility: OTHER | Age: 45
End: 2020-04-10

## 2020-04-15 ENCOUNTER — OFFICE VISIT (OUTPATIENT)
Dept: FAMILY MEDICINE | Facility: OTHER | Age: 45
End: 2020-04-15
Attending: NURSE PRACTITIONER
Payer: COMMERCIAL

## 2020-04-15 VITALS
DIASTOLIC BLOOD PRESSURE: 76 MMHG | BODY MASS INDEX: 36.22 KG/M2 | OXYGEN SATURATION: 98 % | WEIGHT: 211 LBS | SYSTOLIC BLOOD PRESSURE: 120 MMHG | HEART RATE: 78 BPM

## 2020-04-15 DIAGNOSIS — R21 RASH AND NONSPECIFIC SKIN ERUPTION: Primary | ICD-10-CM

## 2020-04-15 DIAGNOSIS — J01.00 ACUTE NON-RECURRENT MAXILLARY SINUSITIS: ICD-10-CM

## 2020-04-15 PROCEDURE — G0463 HOSPITAL OUTPT CLINIC VISIT: HCPCS

## 2020-04-15 PROCEDURE — 99213 OFFICE O/P EST LOW 20 MIN: CPT

## 2020-04-15 RX ORDER — DOXYCYCLINE 100 MG/1
100 CAPSULE ORAL 2 TIMES DAILY
Qty: 20 CAPSULE | Refills: 0 | Status: SHIPPED | OUTPATIENT
Start: 2020-04-15 | End: 2020-04-25

## 2020-04-15 RX ORDER — PREDNISONE 20 MG/1
20 TABLET ORAL 2 TIMES DAILY
Qty: 10 TABLET | Refills: 0 | Status: SHIPPED | OUTPATIENT
Start: 2020-04-15 | End: 2020-04-20

## 2020-04-15 ASSESSMENT — PAIN SCALES - GENERAL: PAINLEVEL: EXTREME PAIN (8)

## 2020-04-15 NOTE — NURSING NOTE
"Chief Complaint   Patient presents with     Derm Problem       Initial /76 (Patient Position: Sitting)   Pulse 78   Wt 95.7 kg (211 lb)   SpO2 98%   BMI 36.22 kg/m   Estimated body mass index is 36.22 kg/m  as calculated from the following:    Height as of 1/31/20: 1.626 m (5' 4\").    Weight as of this encounter: 95.7 kg (211 lb).  Medication Reconciliation: complete  Lianna Hills LPN  "

## 2020-04-15 NOTE — PATIENT INSTRUCTIONS
1. Rash and nonspecific skin eruption    - predniSONE (DELTASONE) 20 MG tablet; Take 1 tablet (20 mg) by mouth 2 times daily for 5 days  Dispense: 10 tablet; Refill: 0  - doxycycline hyclate (VIBRAMYCIN) 100 MG capsule; Take 1 capsule (100 mg) by mouth 2 times daily for 10 days  Dispense: 20 capsule; Refill: 0    Will forward this visit to Dr. Lewis.

## 2020-04-15 NOTE — PROGRESS NOTES
Subjective     Lashae Middleton is a 44 year old female who presents to clinic today for the following health issues:    HPI   Rash      Duration:  Patient has had episodes of this same rash for about the last 4 years. Episodes always start on her arms (both) and then moves to back first but do move to her chest as well. At times it moves to legs (inner thighs) also.   Current episode 1 week.    Description  Current Location: arms. Chest and back   Itching: mild    Intensity:  moderate    Accompanying signs and symptoms: painful hive like rash with mild itching. Had similar rash about a month ago which was treated with steroid and abx however it reappeared about a week ago. Notes worsening sx when body temp rises.    History (similar episodes/previous evaluation): yes    Precipitating or alleviating factors:  New exposures:  None  Recent travel: no      Therapies tried and outcome: steroid cream and oral abx   The only time they have fully gone away for any time was when Dr. Lewis put her on       Sinus     Duration: 2 weeks     Description (location/character/radiation): sinus pressure     Intensity:  moderate    Accompanying signs and symptoms: sinus pressure not accompanied by any other sx. Denies cough, fever SOB, sore throat, etc.    History (similar episodes/previous evaluation): None    Precipitating or alleviating factors: None    Therapies tried and outcome: xyzal and benadryl            Patient Active Problem List   Diagnosis     Abnormal uterine bleeding--S/P ENDOMETRIAL ABLATION, no  bleeding since--10/2017     Depression, recurrent (H)     Hx of antibiotic allergy    PCN,  SULFA     Migraine syndrome     Obesity, Class II, BMI 35-39.9     TMJ (temporomandibular joint syndrome)     Abdominal pain, epigastric-REFLUX-Protonix-8/2018     Vaginal discharge with odor--9/2018     Irritable bowel syndrome with both constipation and diarrhea-LACTOSE iintolerant--8/2018     Pelvic pain in female     Urinary  frequency--UTI--9/2018     Dyspareunia, female-after--9/2018     S/P endometrial ablation     Past Surgical History:   Procedure Laterality Date     HC ABLATION, ENDOMETRIAL, THERMAL, W/O HYSTEROSCOPIC GUIDANCE       TUBAL LIGATION  2011       Social History     Tobacco Use     Smoking status: Current Some Day Smoker     Packs/day: 0.50     Years: 30.00     Pack years: 15.00     Types: Cigarettes     Smokeless tobacco: Former User     Tobacco comment: 2-3 cigarettes a day   Substance Use Topics     Alcohol use: Yes     Comment: wine coolers on occasion     Family History   Problem Relation Age of Onset     Other - See Comments Mother         endometriosis         Current Outpatient Medications   Medication Sig Dispense Refill     albuterol (PROAIR HFA/PROVENTIL HFA/VENTOLIN HFA) 108 (90 Base) MCG/ACT inhaler Inhale 2 puffs into the lungs every 6 hours as needed for shortness of breath / dyspnea or wheezing 1 Inhaler 3     buPROPion (WELLBUTRIN XL) 150 MG 24 hr tablet TAKE 1 TABLET BY MOUTH ONCE DAILY IN THE MORNING 30 tablet 0     doxycycline hyclate (VIBRAMYCIN) 100 MG capsule Take 1 capsule (100 mg) by mouth 2 times daily for 10 days 20 capsule 0     EPINEPHrine (EPIPEN/ADRENACLICK/OR ANY BX GENERIC EQUIV) 0.3 MG/0.3ML injection 2-pack Inject 0.3 mLs (0.3 mg) into the muscle as needed for anaphylaxis 0.6 mL 0     fluticasone (FLONASE) 50 MCG/ACT nasal spray Spray 1-2 sprays into both nostrils daily 11.1 mL 3     pantoprazole 20 MG PO EC tablet TAKE 1 TABLET BY MOUTH ONCE DAILY 30-60  MINUTES  BEFORE  A  MEAL 60 tablet 0     predniSONE (DELTASONE) 20 MG tablet Take 1 tablet (20 mg) by mouth 2 times daily for 5 days 10 tablet 0     rizatriptan (MAXALT) 5 MG tablet TAKE 1 TO 2 TABLETS BY MOUTH AT ONSET OF HEADACHE FOR MIGRAINE. MAY REPEAT IN 2 HOURS. MAX OF 6 TABLETS IN 24 HOURS 30 tablet 1     varenicline (CHANTIX CECILE) 0.5 MG X 11 & 1 MG X 42 tablet Take 0.5 mg tab daily for 3 days, THEN 0.5 mg tab twice daily for 4  days, THEN 1 mg twice daily. 53 tablet 0     Allergies   Allergen Reactions     Sulfa Drugs Anaphylaxis     Latex Hives     No Clinical Screening - See Comments      All cillins      Penicillins      Aspirin Rash     Sumatriptan Rash     Recent Labs   Lab Test 01/27/20  1337 01/01/20  0905 08/31/18  1110 07/26/18  1554 05/02/18  1234  09/21/15   A1C  --   --   --   --   --   --  5.0   LDL  --   --   --   --   --   --  113   HDL  --   --   --   --   --   --  35*   TRIG  --   --   --   --   --   --  166*   ALT  --  62*  --  51* 36  --  31   CR 0.77 0.72  --  0.74 0.72  --  0.8   GFRESTIMATED >90 >90  --  85 88   < >  --    GFRESTBLACK >90 >90  --  >90 >90   < >  --    POTASSIUM 4.0 3.9  --  3.6 3.8  --  4.1   TSH  --   --  1.22  --   --   --  2.01    < > = values in this interval not displayed.      BP Readings from Last 3 Encounters:   04/15/20 120/76   02/21/20 112/86   01/31/20 128/70    Wt Readings from Last 3 Encounters:   04/15/20 95.7 kg (211 lb)   04/03/20 95.3 kg (210 lb)   02/21/20 92.1 kg (203 lb)            Reviewed and updated as needed this visit by Provider         Review of Systems   ROS COMP: Constitutional, HEENT, cardiovascular, pulmonary, gi and gu systems are negative, except as otherwise noted.      Objective    /76 (Patient Position: Sitting)   Pulse 78   Wt 95.7 kg (211 lb)   SpO2 98%   BMI 36.22 kg/m    Body mass index is 36.22 kg/m .     Physical Exam   GENERAL: healthy, alert and no distress  EYES: Eyes grossly normal to inspection, PERRL and conjunctivae and sclerae normal  HENT: normal cephalic/atraumatic, ear canals and TM's normal, nose and mouth without ulcers or lesions, oral mucous membranes moist and sinuses: maxillary tenderness on bilateral  RESP: lungs clear to auscultation - no rales, rhonchi or wheezes  CV: regular rate and rhythm, normal S1 S2, no S3 or S4, no murmur, click or rub, no peripheral edema and peripheral pulses strong  SKIN: raised maculopapular rash with  regional distribution to arms, chest, and back        Assessment & Plan   1. Rash and nonspecific skin eruption  - predniSONE (DELTASONE) 20 MG tablet; Take 1 tablet (20 mg) by mouth 2 times daily for 5 days  Dispense: 10 tablet; Refill: 0  - doxycycline hyclate (VIBRAMYCIN) 100 MG capsule; Take 1 capsule (100 mg) by mouth 2 times daily for 10 days  Dispense: 20 capsule; Refill: 0      Return if symptoms worsen or fail to improve.    Clara Villalba APRN, CNP    [facility] - [place]

## 2020-04-20 ENCOUNTER — MYC MEDICAL ADVICE (OUTPATIENT)
Dept: FAMILY MEDICINE | Facility: OTHER | Age: 45
End: 2020-04-20

## 2020-04-20 DIAGNOSIS — J32.0 CHRONIC MAXILLARY SINUSITIS: Primary | ICD-10-CM

## 2020-04-21 ENCOUNTER — MYC MEDICAL ADVICE (OUTPATIENT)
Dept: FAMILY MEDICINE | Facility: OTHER | Age: 45
End: 2020-04-21

## 2020-04-21 RX ORDER — CLINDAMYCIN HCL 300 MG
300 CAPSULE ORAL 3 TIMES DAILY
Qty: 30 CAPSULE | Refills: 0 | Status: SHIPPED | OUTPATIENT
Start: 2020-04-21 | End: 2020-05-01

## 2020-04-21 NOTE — TELEPHONE ENCOUNTER
New rx sent. Monitor for abd pain, diarrhea with this abx and let me know if this occurs. If not improved, consider sinus imaging and ENT consultation provided for further work up.     Melyssa Lewis MD

## 2020-04-22 ENCOUNTER — MYC MEDICAL ADVICE (OUTPATIENT)
Dept: FAMILY MEDICINE | Facility: OTHER | Age: 45
End: 2020-04-22

## 2020-04-22 NOTE — TELEPHONE ENCOUNTER
She was on doxycycline and new rx was for Clindamycin.  Please call her to clarify.     Melyssa Lewis MD

## 2020-04-22 NOTE — PROGRESS NOTES
Patient has been in contact multiple times since your visit via Etop[facility]. Dr. Lewis. Messages can be reviewed in her chart.

## 2020-04-29 ENCOUNTER — MYC MEDICAL ADVICE (OUTPATIENT)
Dept: FAMILY MEDICINE | Facility: OTHER | Age: 45
End: 2020-04-29

## 2020-04-30 NOTE — TELEPHONE ENCOUNTER
Please advise patients mychart message. I dont know who neetu is that she is speaking of. I tried to call patient and tell them if there are chest pains they need to go to er. But no one answered. I left a message to call clinic back.    PAUL Chino

## 2020-04-30 NOTE — TELEPHONE ENCOUNTER
Will need to reply via mychart for patient. She can have her daughter Annie seen in walk in clinic, but will need to give her full name and <     Melyssa Lewis MD

## 2020-05-12 ENCOUNTER — MYC MEDICAL ADVICE (OUTPATIENT)
Dept: FAMILY MEDICINE | Facility: OTHER | Age: 45
End: 2020-05-12

## 2020-05-12 NOTE — TELEPHONE ENCOUNTER
If not already trialed, start zyrtec or claritin as well as flonase or nasocort. All are over the counter. ENT consultation has been placed, pt would just need to make appointment.     Pt has also been evaluated x 2 for the rash, consider derm consultation. Will place order if pt agreeable.

## 2020-05-13 ENCOUNTER — MYC MEDICAL ADVICE (OUTPATIENT)
Dept: FAMILY MEDICINE | Facility: OTHER | Age: 45
End: 2020-05-13

## 2020-05-13 DIAGNOSIS — R21 RASH AND NONSPECIFIC SKIN ERUPTION: Primary | ICD-10-CM

## 2020-05-14 ENCOUNTER — MYC MEDICAL ADVICE (OUTPATIENT)
Dept: FAMILY MEDICINE | Facility: OTHER | Age: 45
End: 2020-05-14

## 2020-05-16 DIAGNOSIS — F33.9 DEPRESSION, RECURRENT (H): ICD-10-CM

## 2020-05-19 RX ORDER — BUPROPION HYDROCHLORIDE 150 MG/1
TABLET ORAL
Qty: 30 TABLET | Refills: 0 | Status: SHIPPED | OUTPATIENT
Start: 2020-05-19 | End: 2020-12-09

## 2020-05-19 NOTE — TELEPHONE ENCOUNTER
buropion 150mg      Last Written Prescription Date:  1/28/2020  Last Fill Quantity: 30,   # refills: 0  Last Office Visit: 4/15/2020  Future Office visit:       Routing refill request to provider for review/approval because:  PHQ-9 score:    PHQ 4/3/2020   PHQ-9 Total Score 14   Q9: Thoughts of better off dead/self-harm past 2 weeks Not at all   F/U: Thoughts of suicide or self-harm -   F/U: Safety concerns -

## 2020-05-22 ENCOUNTER — PREP FOR PROCEDURE (OUTPATIENT)
Dept: OBGYN | Facility: OTHER | Age: 45
End: 2020-05-22

## 2020-05-22 DIAGNOSIS — R10.2 PELVIC PAIN IN FEMALE: Primary | ICD-10-CM

## 2020-05-22 DIAGNOSIS — N93.9 ABNORMAL UTERINE BLEEDING (AUB): ICD-10-CM

## 2020-05-26 ENCOUNTER — HOSPITAL ENCOUNTER (OUTPATIENT)
Facility: HOSPITAL | Age: 45
End: 2020-05-26
Attending: OBSTETRICS & GYNECOLOGY | Admitting: OBSTETRICS & GYNECOLOGY
Payer: COMMERCIAL

## 2020-05-26 DIAGNOSIS — N93.9 ABNORMAL UTERINE BLEEDING (AUB): ICD-10-CM

## 2020-05-26 DIAGNOSIS — R10.2 PELVIC PAIN IN FEMALE: ICD-10-CM

## 2020-07-06 ENCOUNTER — TELEPHONE (OUTPATIENT)
Dept: OBGYN | Facility: OTHER | Age: 45
End: 2020-07-06

## 2020-07-09 ENCOUNTER — ANESTHESIA EVENT (OUTPATIENT)
Dept: SURGERY | Facility: HOSPITAL | Age: 45
End: 2020-07-09

## 2020-07-09 RX ORDER — LIDOCAINE 40 MG/G
CREAM TOPICAL
Status: CANCELLED | OUTPATIENT
Start: 2020-07-09

## 2020-07-09 RX ORDER — NALOXONE HYDROCHLORIDE 0.4 MG/ML
.1-.4 INJECTION, SOLUTION INTRAMUSCULAR; INTRAVENOUS; SUBCUTANEOUS
Status: CANCELLED | OUTPATIENT
Start: 2020-07-09 | End: 2020-07-10

## 2020-07-09 RX ORDER — ONDANSETRON 4 MG/1
4 TABLET, ORALLY DISINTEGRATING ORAL EVERY 30 MIN PRN
Status: CANCELLED | OUTPATIENT
Start: 2020-07-09

## 2020-07-09 RX ORDER — FENTANYL CITRATE 50 UG/ML
25-50 INJECTION, SOLUTION INTRAMUSCULAR; INTRAVENOUS
Status: CANCELLED | OUTPATIENT
Start: 2020-07-09

## 2020-07-09 RX ORDER — HYDROMORPHONE HYDROCHLORIDE 1 MG/ML
.3-.5 INJECTION, SOLUTION INTRAMUSCULAR; INTRAVENOUS; SUBCUTANEOUS EVERY 5 MIN PRN
Status: CANCELLED | OUTPATIENT
Start: 2020-07-09

## 2020-07-09 RX ORDER — SODIUM CHLORIDE, SODIUM LACTATE, POTASSIUM CHLORIDE, CALCIUM CHLORIDE 600; 310; 30; 20 MG/100ML; MG/100ML; MG/100ML; MG/100ML
INJECTION, SOLUTION INTRAVENOUS CONTINUOUS
Status: CANCELLED | OUTPATIENT
Start: 2020-07-09

## 2020-07-09 RX ORDER — ONDANSETRON 2 MG/ML
4 INJECTION INTRAMUSCULAR; INTRAVENOUS EVERY 30 MIN PRN
Status: CANCELLED | OUTPATIENT
Start: 2020-07-09

## 2020-07-09 ASSESSMENT — LIFESTYLE VARIABLES: TOBACCO_USE: 1

## 2020-07-09 NOTE — ANESTHESIA PREPROCEDURE EVALUATION
Anesthesia Pre-Procedure Evaluation    Patient: Lashae Middleton   MRN: [id number] : 1975          Preoperative Diagnosis: Pelvic pain in female [R10.2]  Abnormal uterine bleeding (AUB) [N93.9]    Procedure(s):  LAPAROSCOPIC ASSISTED VAGINAL HYSTERECTOMY  POSSSIBLE BILATERAL SALPINGO OOPHORECTOMY    Past Medical History:   Diagnosis Date     Abnormal uterine bleeding (AUB)      Past Surgical History:   Procedure Laterality Date     HC ABLATION, ENDOMETRIAL, THERMAL, W/O HYSTEROSCOPIC GUIDANCE       TUBAL LIGATION         Anesthesia Evaluation     . Pt has had prior anesthetic. Type: General           ROS/MED HX    ENT/Pulmonary:     (+)tobacco use, Current use , . .    Neurologic:     (+)migraines,     Cardiovascular:         METS/Exercise Tolerance:     Hematologic:         Musculoskeletal:   (+)  other musculoskeletal- TMJ      GI/Hepatic:     (+) Inflammatory bowel disease,       Renal/Genitourinary:         Endo:     (+) Obesity, .      Psychiatric:     (+) psychiatric history depression      Infectious Disease:         Malignancy:         Other:                                 Lab Results   Component Value Date    WBC 7.3 2020    HGB 13.5 2020    HCT 40.6 2020     2020    CRP <2.9 2018     2020    POTASSIUM 4.0 2020    CHLORIDE 108 2020    CO2 29 2020    BUN 16 2020    CR 0.77 2020    GLC 89 2020    VIVEK 8.5 2020    ALBUMIN 3.3 (L) 2020    PROTTOTAL 6.9 2020    ALT 62 (H) 2020    AST 35 2020    ALKPHOS 89 2020    BILITOTAL 0.5 2020    LIPASE 147 2018    TSH 1.22 2018    T4 1.07 2018    HCG Negative 2020       Preop Vitals  BP Readings from Last 3 Encounters:   04/15/20 120/76   20 112/86   20 128/70    Pulse Readings from Last 3 Encounters:   04/15/20 78   20 85   20 80      Resp Readings from Last 3 Encounters:   20 16  "  01/07/20 14   01/01/20 14    SpO2 Readings from Last 3 Encounters:   04/15/20 98%   02/21/20 98%   01/27/20 96%      Temp Readings from Last 1 Encounters:   02/21/20 98.3  F (36.8  C) (Tympanic)    Ht Readings from Last 1 Encounters:   01/31/20 1.626 m (5' 4\")      Wt Readings from Last 1 Encounters:   04/15/20 95.7 kg (211 lb)    Estimated body mass index is 36.22 kg/m  as calculated from the following:    Height as of 1/31/20: 1.626 m (5' 4\").    Weight as of 4/15/20: 95.7 kg (211 lb).       Anesthesia Plan          Plan for General with Intravenous and Propofol induction. Maintenance will be Balanced.    PONV prophylaxis:  Ondansetron (or other 5HT-3) and Dexamethasone or Solumedrol  No hp         Postoperative Care  Postoperative pain management:  IV analgesics.      Consents                 MYRA Maldonado CRNA  "

## 2020-07-10 ENCOUNTER — TELEPHONE (OUTPATIENT)
Dept: OBGYN | Facility: OTHER | Age: 45
End: 2020-07-10

## 2020-07-10 RX ORDER — ACETAMINOPHEN 325 MG/1
975 TABLET ORAL ONCE
Status: CANCELLED | OUTPATIENT
Start: 2020-07-10 | End: 2020-07-10

## 2020-07-10 RX ORDER — CLINDAMYCIN PHOSPHATE 900 MG/50ML
900 INJECTION, SOLUTION INTRAVENOUS SEE ADMIN INSTRUCTIONS
Status: CANCELLED | OUTPATIENT
Start: 2020-07-10

## 2020-07-10 RX ORDER — PHENAZOPYRIDINE HYDROCHLORIDE 100 MG/1
200 TABLET, FILM COATED ORAL ONCE
Status: CANCELLED | OUTPATIENT
Start: 2020-07-10 | End: 2020-07-10

## 2020-07-10 RX ORDER — CLINDAMYCIN PHOSPHATE 900 MG/50ML
900 INJECTION, SOLUTION INTRAVENOUS
Status: CANCELLED | OUTPATIENT
Start: 2020-07-10

## 2020-07-10 NOTE — TELEPHONE ENCOUNTER
Unable to reach pt to schedule her covid testing and pre op. Have attempted to reach pt multiple times and phone saying unavailable not reachable. Does not even ring. If unable to reach pt before the end of the day surgery will be cancelled.   MADELAINE MEDINA, RIMMAN

## 2020-07-15 ENCOUNTER — ANESTHESIA (OUTPATIENT)
Dept: SURGERY | Facility: HOSPITAL | Age: 45
End: 2020-07-15

## 2020-08-12 ENCOUNTER — OFFICE VISIT (OUTPATIENT)
Dept: CHIROPRACTIC MEDICINE | Facility: OTHER | Age: 45
End: 2020-08-12
Attending: CHIROPRACTOR
Payer: COMMERCIAL

## 2020-08-12 DIAGNOSIS — M99.02 SEGMENTAL AND SOMATIC DYSFUNCTION OF THORACIC REGION: ICD-10-CM

## 2020-08-12 DIAGNOSIS — M99.03 SEGMENTAL AND SOMATIC DYSFUNCTION OF LUMBAR REGION: Primary | ICD-10-CM

## 2020-08-12 DIAGNOSIS — M54.50 ACUTE BILATERAL LOW BACK PAIN WITHOUT SCIATICA: ICD-10-CM

## 2020-08-12 DIAGNOSIS — M99.01 SEGMENTAL AND SOMATIC DYSFUNCTION OF CERVICAL REGION: ICD-10-CM

## 2020-08-12 PROCEDURE — 98941 CHIROPRACT MANJ 3-4 REGIONS: CPT | Mod: AT | Performed by: CHIROPRACTOR

## 2020-08-17 NOTE — PROGRESS NOTES
Subjective Finding:    Chief compalint: Patient presents with:  Back Pain  Neck Pain  , Pain Scale: 6/10, Intensity: sharp, Duration: 2 months, Radiating:  no.    Date of injury:     Activities that the pain restricts:   Home/household/hobbies/social activities: yes.  Work duties: no.  Sleep: no.  Makes symptoms better: rest.  Makes symptoms worse: activity.  Have you seen anyone else for the symptoms? No.  Work related: no.  Automobile related injury: no.    Objective and Assessment:    Posture Analysis:   High shoulder: .  Head tilt: .  High iliac crest: .  Head carriage: neutral.  Thoracic Kyphosis: neutral.  Lumbar Lordosis: forward.    Lumbar Range of Motion: extension decreased.  Cervical Range of Motion: extension decreased.  Thoracic Range of Motion: extension decreased.  Extremity Range of Motion: .    Palpation:   Quad lumb: bilateral, referred pain: no  Lev scapulae: stiff, referred pain: no    Segmental dysfunction pre-treatment and treatment area: C2, C3, T4, L5 and Sacrum.    Assessment post-treatment:  Cervical: ROM increased.  Thoracic: ROM increased.  Lumbar: ROM increased.    Comments: .      Complicating Factors: .    Procedure(s):  [facility]:  [address] Chiropractic manipulative treatment 3-4 regions performed   Cervical: Diversified, See above for level, Supine, Thoracic: Diversified, See above for level, Prone and Lumbar: Diversified, See above for level, Side posture    Modalities:  None performed this visit    Therapeutic procedures:  None    Plan:  Treatment plan: PRN.  Instructed patient: stretch as instructed at visit.  Short term goals: increase ROM.  Long term goals: restore normal function.  Prognosis: very good.

## 2020-08-18 ENCOUNTER — MYC MEDICAL ADVICE (OUTPATIENT)
Dept: OBGYN | Facility: OTHER | Age: 45
End: 2020-08-18

## 2020-08-19 ENCOUNTER — TELEPHONE (OUTPATIENT)
Dept: OBGYN | Facility: OTHER | Age: 45
End: 2020-08-19

## 2020-08-19 ENCOUNTER — MYC MEDICAL ADVICE (OUTPATIENT)
Dept: OBGYN | Facility: OTHER | Age: 45
End: 2020-08-19

## 2020-08-19 NOTE — TELEPHONE ENCOUNTER
Pt would like to schedule surgery LAV. She left a message and I left a message back to her. I did give her some surgery dates that are open right now.  Thank you  Josseline

## 2020-08-24 ENCOUNTER — MYC MEDICAL ADVICE (OUTPATIENT)
Dept: FAMILY MEDICINE | Facility: OTHER | Age: 45
End: 2020-08-24

## 2020-08-24 ENCOUNTER — MYC MEDICAL ADVICE (OUTPATIENT)
Dept: OBGYN | Facility: OTHER | Age: 45
End: 2020-08-24

## 2020-08-25 ENCOUNTER — MYC MEDICAL ADVICE (OUTPATIENT)
Dept: OBGYN | Facility: OTHER | Age: 45
End: 2020-08-25

## 2020-08-29 ENCOUNTER — MYC MEDICAL ADVICE (OUTPATIENT)
Dept: OBGYN | Facility: OTHER | Age: 45
End: 2020-08-29

## 2020-08-31 ENCOUNTER — MYC MEDICAL ADVICE (OUTPATIENT)
Dept: OBGYN | Facility: OTHER | Age: 45
End: 2020-08-31

## 2020-09-01 ENCOUNTER — MYC MEDICAL ADVICE (OUTPATIENT)
Dept: OBGYN | Facility: OTHER | Age: 45
End: 2020-09-01

## 2020-09-01 NOTE — TELEPHONE ENCOUNTER
You would only need hormones if ovaries removed.  We had discussed that would only be done if severe endometriosis present or diseased.  We would generally leave the ovaries(or at least one ovary) and in that case you would not be in menopause or need hormones.

## 2020-09-01 NOTE — TELEPHONE ENCOUNTER
No. You wouldn't bleed with no uturus   Maybe we should schedule appt prior to surgery to discuss this.  It has been a while since your appt and I think and to long of discussion to do by messaging and sounds like we need to review things.

## 2020-09-04 ENCOUNTER — OFFICE VISIT (OUTPATIENT)
Dept: FAMILY MEDICINE | Facility: OTHER | Age: 45
End: 2020-09-04
Attending: FAMILY MEDICINE
Payer: COMMERCIAL

## 2020-09-04 ENCOUNTER — MYC MEDICAL ADVICE (OUTPATIENT)
Dept: FAMILY MEDICINE | Facility: OTHER | Age: 45
End: 2020-09-04

## 2020-09-04 VITALS
SYSTOLIC BLOOD PRESSURE: 122 MMHG | WEIGHT: 220 LBS | DIASTOLIC BLOOD PRESSURE: 82 MMHG | OXYGEN SATURATION: 98 % | BODY MASS INDEX: 37.56 KG/M2 | HEIGHT: 64 IN | HEART RATE: 77 BPM

## 2020-09-04 DIAGNOSIS — Z72.0 TOBACCO ABUSE: ICD-10-CM

## 2020-09-04 DIAGNOSIS — F33.9 DEPRESSION, RECURRENT (H): ICD-10-CM

## 2020-09-04 DIAGNOSIS — R10.2 PELVIC PAIN IN FEMALE: ICD-10-CM

## 2020-09-04 DIAGNOSIS — G43.909 MIGRAINE SYNDROME: ICD-10-CM

## 2020-09-04 DIAGNOSIS — R06.83 SNORING: ICD-10-CM

## 2020-09-04 DIAGNOSIS — N93.9 ABNORMAL UTERINE BLEEDING (AUB): ICD-10-CM

## 2020-09-04 DIAGNOSIS — Z01.818 PREOP GENERAL PHYSICAL EXAM: Primary | ICD-10-CM

## 2020-09-04 DIAGNOSIS — K58.2 IRRITABLE BOWEL SYNDROME WITH BOTH CONSTIPATION AND DIARRHEA: ICD-10-CM

## 2020-09-04 LAB
ALBUMIN SERPL-MCNC: 3.6 G/DL (ref 3.4–5)
ALBUMIN UR-MCNC: NEGATIVE MG/DL
ALP SERPL-CCNC: 84 U/L (ref 40–150)
ALT SERPL W P-5'-P-CCNC: 29 U/L (ref 0–50)
ANION GAP SERPL CALCULATED.3IONS-SCNC: 5 MMOL/L (ref 3–14)
APPEARANCE UR: CLEAR
AST SERPL W P-5'-P-CCNC: 12 U/L (ref 0–45)
BACTERIA #/AREA URNS HPF: ABNORMAL /HPF
BASOPHILS # BLD AUTO: 0.1 10E9/L (ref 0–0.2)
BASOPHILS NFR BLD AUTO: 1 %
BILIRUB SERPL-MCNC: 0.3 MG/DL (ref 0.2–1.3)
BILIRUB UR QL STRIP: NEGATIVE
BUN SERPL-MCNC: 16 MG/DL (ref 7–30)
CALCIUM SERPL-MCNC: 9 MG/DL (ref 8.5–10.1)
CHLORIDE SERPL-SCNC: 110 MMOL/L (ref 94–109)
CO2 SERPL-SCNC: 24 MMOL/L (ref 20–32)
COLOR UR AUTO: YELLOW
CREAT SERPL-MCNC: 0.75 MG/DL (ref 0.52–1.04)
DIFFERENTIAL METHOD BLD: NORMAL
EOSINOPHIL # BLD AUTO: 0.2 10E9/L (ref 0–0.7)
EOSINOPHIL NFR BLD AUTO: 2.4 %
ERYTHROCYTE [DISTWIDTH] IN BLOOD BY AUTOMATED COUNT: 12.6 % (ref 10–15)
GFR SERPL CREATININE-BSD FRML MDRD: >90 ML/MIN/{1.73_M2}
GLUCOSE SERPL-MCNC: 97 MG/DL (ref 70–99)
GLUCOSE UR STRIP-MCNC: NEGATIVE MG/DL
HCT VFR BLD AUTO: 43.2 % (ref 35–47)
HGB BLD-MCNC: 14.7 G/DL (ref 11.7–15.7)
HGB UR QL STRIP: ABNORMAL
IMM GRANULOCYTES # BLD: 0 10E9/L (ref 0–0.4)
IMM GRANULOCYTES NFR BLD: 0.1 %
KETONES UR STRIP-MCNC: NEGATIVE MG/DL
LEUKOCYTE ESTERASE UR QL STRIP: ABNORMAL
LYMPHOCYTES # BLD AUTO: 2.7 10E9/L (ref 0.8–5.3)
LYMPHOCYTES NFR BLD AUTO: 38.3 %
MCH RBC QN AUTO: 32.9 PG (ref 26.5–33)
MCHC RBC AUTO-ENTMCNC: 34 G/DL (ref 31.5–36.5)
MCV RBC AUTO: 97 FL (ref 78–100)
MONOCYTES # BLD AUTO: 0.5 10E9/L (ref 0–1.3)
MONOCYTES NFR BLD AUTO: 7.7 %
MUCOUS THREADS #/AREA URNS LPF: PRESENT /LPF
NEUTROPHILS # BLD AUTO: 3.6 10E9/L (ref 1.6–8.3)
NEUTROPHILS NFR BLD AUTO: 50.5 %
NITRATE UR QL: NEGATIVE
NRBC # BLD AUTO: 0 10*3/UL
NRBC BLD AUTO-RTO: 0 /100
PH UR STRIP: 5.5 PH (ref 4.7–8)
PLATELET # BLD AUTO: 219 10E9/L (ref 150–450)
POTASSIUM SERPL-SCNC: 4 MMOL/L (ref 3.4–5.3)
PROT SERPL-MCNC: 7.4 G/DL (ref 6.8–8.8)
RBC # BLD AUTO: 4.47 10E12/L (ref 3.8–5.2)
RBC #/AREA URNS AUTO: 2 /HPF (ref 0–2)
SODIUM SERPL-SCNC: 139 MMOL/L (ref 133–144)
SOURCE: ABNORMAL
SP GR UR STRIP: 1.03 (ref 1–1.03)
SQUAMOUS #/AREA URNS AUTO: 17 /HPF (ref 0–1)
UROBILINOGEN UR STRIP-MCNC: NORMAL MG/DL (ref 0–2)
WBC # BLD AUTO: 7 10E9/L (ref 4–11)
WBC #/AREA URNS AUTO: 5 /HPF (ref 0–5)

## 2020-09-04 PROCEDURE — G0463 HOSPITAL OUTPT CLINIC VISIT: HCPCS

## 2020-09-04 PROCEDURE — 36415 COLL VENOUS BLD VENIPUNCTURE: CPT | Mod: ZL | Performed by: FAMILY MEDICINE

## 2020-09-04 PROCEDURE — 81001 URINALYSIS AUTO W/SCOPE: CPT | Mod: ZL | Performed by: FAMILY MEDICINE

## 2020-09-04 PROCEDURE — 99214 OFFICE O/P EST MOD 30 MIN: CPT | Performed by: FAMILY MEDICINE

## 2020-09-04 PROCEDURE — 93010 ELECTROCARDIOGRAM REPORT: CPT | Performed by: INTERNAL MEDICINE

## 2020-09-04 PROCEDURE — 85025 COMPLETE CBC W/AUTO DIFF WBC: CPT | Mod: ZL | Performed by: FAMILY MEDICINE

## 2020-09-04 PROCEDURE — G0463 HOSPITAL OUTPT CLINIC VISIT: HCPCS | Mod: 25

## 2020-09-04 PROCEDURE — 93005 ELECTROCARDIOGRAM TRACING: CPT

## 2020-09-04 PROCEDURE — 80053 COMPREHEN METABOLIC PANEL: CPT | Mod: ZL | Performed by: FAMILY MEDICINE

## 2020-09-04 RX ORDER — BUPROPION HYDROCHLORIDE 150 MG/1
300 TABLET ORAL EVERY MORNING
Qty: 180 TABLET | Refills: 0 | Status: ON HOLD | OUTPATIENT
Start: 2020-09-04 | End: 2020-09-24

## 2020-09-04 RX ORDER — RIZATRIPTAN BENZOATE 5 MG/1
TABLET ORAL
Qty: 30 TABLET | Refills: 1 | Status: SHIPPED | OUTPATIENT
Start: 2020-09-04 | End: 2020-09-28

## 2020-09-04 ASSESSMENT — ANXIETY QUESTIONNAIRES
4. TROUBLE RELAXING: NEARLY EVERY DAY
2. NOT BEING ABLE TO STOP OR CONTROL WORRYING: NEARLY EVERY DAY
5. BEING SO RESTLESS THAT IT IS HARD TO SIT STILL: NEARLY EVERY DAY
3. WORRYING TOO MUCH ABOUT DIFFERENT THINGS: NEARLY EVERY DAY
7. FEELING AFRAID AS IF SOMETHING AWFUL MIGHT HAPPEN: MORE THAN HALF THE DAYS
1. FEELING NERVOUS, ANXIOUS, OR ON EDGE: NEARLY EVERY DAY
6. BECOMING EASILY ANNOYED OR IRRITABLE: NEARLY EVERY DAY
GAD7 TOTAL SCORE: 20

## 2020-09-04 ASSESSMENT — ASTHMA QUESTIONNAIRES
QUESTION_3 LAST FOUR WEEKS HOW OFTEN DID YOUR ASTHMA SYMPTOMS (WHEEZING, COUGHING, SHORTNESS OF BREATH, CHEST TIGHTNESS OR PAIN) WAKE YOU UP AT NIGHT OR EARLIER THAN USUAL IN THE MORNING: ONCE OR TWICE
QUESTION_2 LAST FOUR WEEKS HOW OFTEN HAVE YOU HAD SHORTNESS OF BREATH: ONCE OR TWICE A WEEK
ACT_TOTALSCORE: 21
QUESTION_5 LAST FOUR WEEKS HOW WOULD YOU RATE YOUR ASTHMA CONTROL: WELL CONTROLLED
QUESTION_4 LAST FOUR WEEKS HOW OFTEN HAVE YOU USED YOUR RESCUE INHALER OR NEBULIZER MEDICATION (SUCH AS ALBUTEROL): ONCE A WEEK OR LESS
QUESTION_1 LAST FOUR WEEKS HOW MUCH OF THE TIME DID YOUR ASTHMA KEEP YOU FROM GETTING AS MUCH DONE AT WORK, SCHOOL OR AT HOME: NONE OF THE TIME

## 2020-09-04 ASSESSMENT — MIFFLIN-ST. JEOR: SCORE: 1622.91

## 2020-09-04 ASSESSMENT — PATIENT HEALTH QUESTIONNAIRE - PHQ9: SUM OF ALL RESPONSES TO PHQ QUESTIONS 1-9: 13

## 2020-09-04 ASSESSMENT — PAIN SCALES - GENERAL: PAINLEVEL: NO PAIN (0)

## 2020-09-04 NOTE — PROGRESS NOTES
[facility] [place]  [address]  [place] [address]  Phone: [phone]  Primary Provider: Melyssa Lewis  Pre-op Performing Provider: MELYSSA LEWIS    PREOPERATIVE EVALUATION:  Today's date: 9/4/2020    Lashae Middleton is a 44 year old female who presents for a preoperative evaluation.    Surgical Information:  Surgery Details 9/3/2020   Surgery/Procedure: Hysterectomy   Surgery Location: [facility]   Surgeon: Dr jarrett   Surgery Date: 09/23/2020   Time of Surgery: Unknown   Where patient plans to recover: At home with family     Fax number for surgical facility: Note does not need to be faxed, will be available electronically in Epic.  Type of Anesthesia Anticipated: to be determined    Subjective     HPI related to upcoming procedure: Hysterectomy due to chronic pelvic pain/AUB.     Preop Questions 9/3/2020   1. Have you ever had a heart attack or stroke? No   2. Have you ever had surgery on your heart or blood vessels, such as a stent placement, a coronary artery bypass, or surgery on an artery in your head, neck, heart, or legs? No   3. Do you have chest pain with activity? No   4. Do you have a history of  heart failure? No   5. Do you currently have a cold, bronchitis or symptoms of other infection? No   6. Do you have a cough, shortness of breath, or wheezing? No   7. Do you or anyone in your family have previous history of blood clots? No   8. Do you or does anyone in your family have a serious bleeding problem such as prolonged bleeding following surgeries or cuts? No   9. Have you ever had problems with anemia or been told to take iron pills? YES - during pregnancy    10. Have you had any abnormal blood loss such as black, tarry or bloody stools, or abnormal vaginal bleeding? YES - currently    11. Have you ever had a blood transfusion? YES - age 15   11a. Have you ever had a transfusion reaction? NO   Are you willing to have a blood transfusion if it is medically needed  before, during, or after your surgery? Yes   13. Have you or any of your relatives ever had problems with anesthesia? UNKNOWN - pt does not    14. Do you have sleep apnea, excessive snoring or daytime drowsiness? YES - all the above   14a. Do you have a CPAP machine? No   15. Do you have any artifical heart valves or other implanted medical devices like a pacemaker, defibrillator, or continuous glucose monitor? No   16. Do you have artificial joints? No   17. Are you allergic to latex? YES:    18. Is there any chance that you may be pregnant? No     Patient does not have a Health Care Directive or Living Will: Discussed advance care planning with patient; however, patient declined at this time.    [place] ([place] reviewed:  reviewed- no concerns    DEPRESSION - Patient has a long history of Depression of moderate severity requiring medication for control with recent symptoms being unchanged..Current symptoms of depression include excessive sleepiness, fatigue.     TOBACCO ABUSE - smoking half a pack a day. On wellbutrin which has no helped. Would like to increase her dose from 150mg daily as she has been tolerating.     IRRITABLE BOWEL - Stable. Ongoing pelvic pain.     GERD - On Prontix, asymptomatic.     MIGRAINE - Maxalt helpful, needs refill. Not on prophylactic medication.     Review of Systems  CONSTITUTIONAL: NEGATIVE for fever, chills, change in weight  INTEGUMENTARY/SKIN: NEGATIVE for worrisome rashes, moles or lesions  EYES: NEGATIVE for vision changes or irritation  ENT/MOUTH: NEGATIVE for ear, mouth and throat problems  RESP: NEGATIVE for significant cough or SOB  BREAST: NEGATIVE for masses, tenderness or discharge  CV: NEGATIVE for chest pain, palpitations or peripheral edema  GI: NEGATIVE for nausea, abdominal pain, heartburn, or change in bowel habits  : NEGATIVE for frequency, dysuria, or hematuria  MUSCULOSKELETAL: NEGATIVE for significant arthralgias or myalgia  NEURO: NEGATIVE  for weakness, dizziness or paresthesias  ENDOCRINE: NEGATIVE for temperature intolerance, skin/hair changes  HEME: NEGATIVE for bleeding problems  PSYCHIATRIC: NEGATIVE for changes in mood or affect    Patient Active Problem List    Diagnosis Date Noted     Abnormal uterine bleeding (AUB) 05/26/2020     Priority: Medium     Added automatically from request for surgery [id number]       S/P endometrial ablation 01/03/2020     Priority: Medium     Abdominal pain, epigastric-REFLUX-Protonix-8/2018 08/31/2018     Priority: Medium     Vaginal discharge with odor--9/2018 08/31/2018     Priority: Medium     Irritable bowel syndrome with both constipation and diarrhea-LACTOSE iintolerant--8/2018 08/31/2018     Priority: Medium     Pelvic pain in female 08/31/2018     Priority: Medium     Urinary frequency--UTI--9/2018 08/31/2018     Priority: Medium     Dyspareunia, female-after--9/2018 08/31/2018     Priority: Medium     Depression, recurrent (H) 10/25/2017     Priority: Medium     Abnormal uterine bleeding--S/P ENDOMETRIAL ABLATION, no  bleeding since--10/2017 09/21/2017     Priority: Medium     Migraine syndrome 09/12/2017     Priority: Medium     Hx of antibiotic allergy    PCN,  SULFA 03/20/2017     Priority: Medium     Obesity, Class II, BMI 35-39.9 03/20/2017     Priority: Medium     TMJ (temporomandibular joint syndrome) 03/20/2017     Priority: Medium      Past Medical History:   Diagnosis Date     Abnormal uterine bleeding (AUB)      Past Surgical History:   Procedure Laterality Date     HC ABLATION, ENDOMETRIAL, THERMAL, W/O HYSTEROSCOPIC GUIDANCE       TUBAL LIGATION  2011     Current Outpatient Medications   Medication Sig Dispense Refill     albuterol (PROAIR HFA/PROVENTIL HFA/VENTOLIN HFA) 108 (90 Base) MCG/ACT inhaler Inhale 2 puffs into the lungs every 6 hours as needed for shortness of breath / dyspnea or wheezing 1 Inhaler 3     buPROPion (WELLBUTRIN XL) 150 MG 24 hr tablet Take 2 tablets (300 mg) by mouth  "every morning 180 tablet 0     buPROPion (WELLBUTRIN XL) 150 MG 24 hr tablet TAKE 1 TABLET BY MOUTH ONCE DAILY IN THE MORNING 30 tablet 0     EPINEPHrine (EPIPEN/ADRENACLICK/OR ANY BX GENERIC EQUIV) 0.3 MG/0.3ML injection 2-pack Inject 0.3 mLs (0.3 mg) into the muscle as needed for anaphylaxis 0.6 mL 0     fluticasone (FLONASE) 50 MCG/ACT nasal spray Spray 1-2 sprays into both nostrils daily 11.1 mL 3     pantoprazole 20 MG PO EC tablet TAKE 1 TABLET BY MOUTH ONCE DAILY 30-60  MINUTES  BEFORE  A  MEAL 60 tablet 0     rizatriptan (MAXALT) 5 MG tablet TAKE 1 TO 2 TABLETS BY MOUTH AT ONSET OF HEADACHE FOR MIGRAINE. MAY REPEAT IN 2 HOURS. MAX OF 6 TABLETS IN 24 HOURS 30 tablet 1       Allergies   Allergen Reactions     Sulfa Drugs Anaphylaxis     Latex Hives     No Clinical Screening - See Comments      All cillins      Penicillins      Aspirin Rash     Sumatriptan Rash        Social History     Tobacco Use     Smoking status: Current Some Day Smoker     Packs/day: 0.50     Years: 30.00     Pack years: 15.00     Types: Cigarettes     Smokeless tobacco: Former User     Tobacco comment: 2-3 cigarettes a day   Substance Use Topics     Alcohol use: Yes     Comment: wine coolers on occasion     History   Drug Use No            Objective   /82 (Patient Position: Sitting)   Pulse 77   Ht 1.626 m (5' 4\")   Wt 99.8 kg (220 lb)   SpO2 98%   BMI 37.76 kg/m    Physical Exam    GENERAL APPEARANCE: healthy, alert and no distress     EYES: EOMI, PERRL     HENT: ear canals and TM's normal and nose and mouth without ulcers or lesions     NECK: no adenopathy, no asymmetry, masses, or scars and thyroid normal to palpation     RESP: lungs clear to auscultation - no rales, rhonchi or wheezes     CV: regular rates and rhythm, normal S1 S2, no S3 or S4 and no murmur, click or rub     ABDOMEN:  soft, nontender, no HSM or masses and bowel sounds normal     MS: extremities normal- no gross deformities noted     SKIN: no suspicious " lesions or rashes     NEURO: Normal strength and tone, sensory exam grossly normal, mentation intact and speech normal     PSYCH: mentation appears normal. and affect normal/bright     LYMPHATICS: No cervical adenopathy    Recent Labs   Lab Test 01/27/20  1337 01/01/20  0905   HGB 13.5 14.1    219    143   POTASSIUM 4.0 3.9   CR 0.77 0.72        PRE-OP Diagnostics:  Recent Results (from the past 24 hour(s))   CBC with platelets and differential    Collection Time: 09/04/20  2:17 PM   Result Value Ref Range    WBC 7.0 4.0 - 11.0 10e9/L    RBC Count 4.47 3.8 - 5.2 10e12/L    Hemoglobin 14.7 11.7 - 15.7 g/dL    Hematocrit 43.2 35.0 - 47.0 %    MCV 97 78 - 100 fl    MCH 32.9 26.5 - 33.0 pg    MCHC 34.0 31.5 - 36.5 g/dL    RDW 12.6 10.0 - 15.0 %    Platelet Count 219 150 - 450 10e9/L    Diff Method Automated Method     % Neutrophils 50.5 %    % Lymphocytes 38.3 %    % Monocytes 7.7 %    % Eosinophils 2.4 %    % Basophils 1.0 %    % Immature Granulocytes 0.1 %    Nucleated RBCs 0 0 /100    Absolute Neutrophil 3.6 1.6 - 8.3 10e9/L    Absolute Lymphocytes 2.7 0.8 - 5.3 10e9/L    Absolute Monocytes 0.5 0.0 - 1.3 10e9/L    Absolute Eosinophils 0.2 0.0 - 0.7 10e9/L    Absolute Basophils 0.1 0.0 - 0.2 10e9/L    Abs Immature Granulocytes 0.0 0 - 0.4 10e9/L    Absolute Nucleated RBC 0.0    Comprehensive metabolic panel (BMP + Alb, Alk Phos, ALT, AST, Total. Bili, TP)    Collection Time: 09/04/20  2:17 PM   Result Value Ref Range    Sodium 139 133 - 144 mmol/L    Potassium 4.0 3.4 - 5.3 mmol/L    Chloride 110 (H) 94 - 109 mmol/L    Carbon Dioxide 24 20 - 32 mmol/L    Anion Gap 5 3 - 14 mmol/L    Glucose 97 70 - 99 mg/dL    Urea Nitrogen 16 7 - 30 mg/dL    Creatinine 0.75 0.52 - 1.04 mg/dL    GFR Estimate >90 >60 mL/min/[1.73_m2]    GFR Estimate If Black >90 >60 mL/min/[1.73_m2]    Calcium 9.0 8.5 - 10.1 mg/dL    Bilirubin Total 0.3 0.2 - 1.3 mg/dL    Albumin 3.6 3.4 - 5.0 g/dL    Protein Total 7.4 6.8 - 8.8 g/dL     Alkaline Phosphatase 84 40 - 150 U/L    ALT 29 0 - 50 U/L    AST 12 0 - 45 U/L   UA reflex to Microscopic and Culture - [place]    Collection Time: 09/04/20  2:18 PM    Specimen: Midstream Urine   Result Value Ref Range    Color Urine Yellow     Appearance Urine Clear     Glucose Urine Negative NEG^Negative mg/dL    Bilirubin Urine Negative NEG^Negative    Ketones Urine Negative NEG^Negative mg/dL    Specific Gravity Urine 1.029 1.003 - 1.035    Blood Urine Small (A) NEG^Negative    pH Urine 5.5 4.7 - 8.0 pH    Protein Albumin Urine Negative NEG^Negative mg/dL    Urobilinogen mg/dL Normal 0.0 - 2.0 mg/dL    Nitrite Urine Negative NEG^Negative    Leukocyte Esterase Urine Small (A) NEG^Negative    Source Midstream Urine     RBC Urine 2 0 - 2 /HPF    WBC Urine 5 0 - 5 /HPF    Bacteria Urine Few (A) NEG^Negative /HPF    Squamous Epithelial /HPF Urine 17 (H) 0 - 1 /HPF    Mucous Urine Present (A) NEG^Negative /LPF     EKG: appears normal, NSR, normal axis, normal intervals, no acute ST/T changes c/w ischemia, no LVH by voltage criteria, unchanged from previous tracings       Assessment & Plan   The proposed surgical procedure is considered INTERMEDIATE risk.    REVISED CARDIAC RISK INDEX  The patient has the following serious cardiovascular risks for perioperative complications:  No serious cardiac risks = 0 points    INTERPRETATION: 0 points: Class I (very low risk - 0.4% complication rate)       Preop general physical exam / Pelvic pain in female / Abnormal uterine bleeding (AUB)  Labs and EKG reviewed.   - UA reflex to Microscopic and Culture - [place]  - EKG 12-lead complete w/read - (Clinic Performed)  - CBC with platelets and differential  - Comprehensive metabolic panel (BMP + Alb, Alk Phos, ALT, AST, Total. Bili, TP)    Depression, recurrent (H) / Tobacco abuse  Ongoing dysthymia, but stable. Trial of Wellbutrin 300mg daily.     Migraine  Stable, refilled Maxalt.     Snoring  STOP BANG 4 today. Sleep Eval  Consultation ordered.     The patient has the following additional risks and recommendations for perioperative complications:     - No identified additional risk factors other than previously addressed    POSSIBLE SLEEP APNEA: YES     STOP-Bang Total Score 9/4/2020   Total Score 4   Risk Stratification 3 - 4: Moderate Risk for PAT        MEDICATION INSTRUCTIONS:  Patient is to take all scheduled medications on the day of surgery    RECOMMENDATION:  APPROVAL GIVEN to proceed with proposed procedure, without further diagnostic evaluation.    Return if symptoms worsen or fail to improve.    Signed Electronically by: Melyssa Lewis MD    Copy of this evaluation report is provided to requesting physician.    [facility] Pre[facility] Guidelines    Revised Cardiac Risk Index

## 2020-09-04 NOTE — PROGRESS NOTES
Spoke with pt and she will be given surgery book and soap on day before surgery when she sees Dr Gomez.

## 2020-09-04 NOTE — PATIENT INSTRUCTIONS
"  Preparing for Your Surgery  Getting started  A surgery nurse will call you to review your health history and instructions. They will give you an arrival time based on your scheduled surgery time.  Please be ready to share the following:    Your doctor's clinic name and phone number    Your medical, surgical and anesthesia history    A list of allergies and sensitivities    A list of medicines, including herbal treatments and over-the-counter drugs    Whether the patient has a legal guardian (ask how to send us the papers in advance)  If your child is having surgery, please ask for a copy of Preparing for Your Child's Surgery.    Preparing for surgery    Within 30 days of surgery: Have an exam at your family clinic (primary care clinic), or go to a pre-operative clinic. This exam is called a \"History and Physical,\" or H&P.    At your H&P exam, talk to your care team about all medicines you take. If you need to stop any medicines before surgery, ask when to start taking them again.  ? We do this for your safety. Many medicines can make you bleed too much during surgery. Some change how well surgery (anesthesia) drugs work.    Call your insurance company to see what it will and won't pay for. Ask if they need to pre-approve the surgery. (If no insurance, call [phone].)    Call your surgeon's clinic if there's any change in your health. This includes signs of a cold or flu (sore throat, runny nose, cough, rash, fever). It also includes a scrape or scratch near the surgery site.    If you have questions on the day of surgery, call your surgery center.  Eating and drinking guidelines  For your safety: Unless your surgeon tells you otherwise, follow the guidelines below.    Eat and drink as usual until 8 hours before surgery. After that, no food or milk.    Drink clear liquids until 2 hours before surgery. These are liquids you can see through, like water, Gatorade and Propel Water. You may also have black coffee " and tea (no cream or milk).    Nothing by mouth within 2 hours of surgery. This includes gum, candy and breath mints.    Stop alcohol the midnight before surgery.    If your family clinic tells you to take medicine on the morning of surgery, it's okay to take it with a sip of water.  Preventing infection    Shower or bathe the night before and morning of your surgery. Follow the instructions your clinic gave you. (If no instructions, use regular soap.)    Don't shave or clip hair near your surgery site. This can lead to skin infection.    Don't smoke the morning of surgery. Smoking increases the risk of infection. You may chew nicotine gum up to 2 hours before surgery. A nicotine patch is okay.  ? Note: Some surgeries require you to completely quit smoking and nicotine. Check with your surgeon.    Your care team will make every effort to keep you safe from infection. We will:  ? Clean our hands often with soap and water (or an alcohol-based hand rub).  ? Clean the skin at your surgery site with a special soap that kills germs. We'll also remove hair from the site as needed.  ? Wear special hair covers, masks, gowns and gloves during surgery.  ? Give antibiotic medicine, if prescribed. Not all surgeries need antibiotics.  What to bring on the day of surgery    Photo ID and insurance card    Copy of your health care directive, if you have one    Glasses and hearing aides (bring cases)  ? You can't wear contacts during surgery    Inhaler and eye drops, if you use them (tell us about these when you arrive)    CPAP machine or breathing device, if you use them    A few personal items, if spending the night    If you have . . .  ? A pacemaker or ICD (cardiac defibrillator): Bring the ID card.  ? An implanted stimulator: Bring the remote control.  ? A legal guardian: Bring a copy of the certified (court-stamped) guardianship papers.  Please remove any jewelry, including body piercings. Leave jewelry and other valuables at  home.  If you're going home the day of surgery  Important: If you don't follow the rules below, we must cancel your surgery.     Arrange for someone to drive you home after surgery. You may not drive, take a taxi or take public transportation by yourself (unless you'll have local anesthesia only).    Arrange for a responsible adult to stay with you overnight. If you don't, we may keep you in the hospital overnight, and you may need to pay the costs yourself.  Questions?   If you have any questions for your care team, list them here: _________________________________________________________________________________________________________________________________________________________________________________________________________________________________________________________________________________________________________________________  For informational purposes only. Not to replace the advice of your health care provider. Copyright   3427-7837 [employer]. All rights reserved. Clinically reviewed by Renata Lea MD. [employer] 232788 - REV 07/19.    Before Your Procedure or Hospital Admission  Testing for COVID-19 (Coronavirus)  Thank you for choosing [facility] for your health care needs. This is a very challenging time for everyone. The World Health Organization and the State of [place] have declared the COVID-19 virus a pandemic.   Our goal is to keep you and our team here at [facility] safe and healthy. We've taken several steps to make this happen. For example:    We screen our staff, care teams and patients for COVID-19.    Everyone at [facility] must wear a mask and stay 6 feet apart.    We are limiting hospital and clinic visitors.  Before you come in  All patients must get tested for COVID-19. Your test needs to happen 1 to 4 days before you check in to the hospital or surgery site.  We'll call about a week in advance to set up a testing time. The call may come from  a phone number you don't know. Then, you'll need to travel to a testing clinic, where we'll take a swab of your nose or throat.   After the test, please stay at home and stay out of contact with other people. It will be harder for you to recover if you get COVID-19 before your treatment.   Please follow all current safety guidelines, including:    Limit trips outside your home.    Limit the number of people you see.    Always wear a mask outside your home.    Use social distancing. (Stay 6 feet away from others whenever you can.)    Wash your hands often.  If your test shows you have COVID-19  If your test is positive, we'll let you know. A positive test means that you have the virus.   We'll probably have to postpone your admission, surgery or procedure. Your doctor will discuss this with you. After that, we'll let you know what to do and when you can reschedule.   We may need to cancel your treatment on short notice for other reasons, too.  If your test shows you DON'T have COVID-19  Even if your test is negative, you may still get COVID-19. It's rare but, sometimes, the test result is wrong. You could also catch the virus after taking the test.   There's a very small chance that you could catch COVID-19 in the hospital or surgery center. [facility] has taken many steps to prevent this from happening.   Day of your surgery or procedure    Please come wearing a mask or something else that covers both your nose and mouth.    When you arrive, we'll ask you some questions to find out if you have any signs or symptoms of COVID-19.    Ask your care team if you can have visitors. All visitors must wear masks and will be screened for signs of COVID-19.  ? Even if no visitors are allowed, you can still have with you:    Your legal guardian or legal decision maker    A parent and one other visitor, if you are younger than 18 years old    A partner and a , if you are in labor  ? We might need to teach you about  taking care of yourself after surgery. If so, a visitor can come into the hospital to learn about it, too.  ? The rules for visitors change often, depending on how much the virus is spreading. To learn more, see Visiting a Loved One in the Hospital during the COVID-19 Outbreak.  Please call your care team, hospital or surgery center if you have any questions. We thank you for your understanding and for choosing [facility] for your care.   Questions and Answers  Does it matter where I get tested for COVID-19?  Yes. We urge you to get tested at one of our [facility] COVID-19 testing sites. We process these tests in our lab and can get the results quickly. Your [facility] care team needs to get your results before you check in.  What should I do if I can't get tested at [facility]?  You can get tested somewhere else, but you'll need to take these extra steps:  1. Contact your family doctor or clinic to arrange your test.  2. Take the test within 4 days of your surgery or procedure. We can't accept tests older than 4 days.  3. Make sure your doctor or clinic faxes your results to [facility] at [phone].  If we don't get your results in time, we may have to postpone or cancel your treatment.  For informational purposes only. Not to replace the advice of your health care provider. Copyright   2020 [facility]. All rights reserved. Clinically reviewed by Infection Prevention and the [facility] COVID-19 Clinical Team. [employer] [id number] - 07/20.    How to Take Your Medication Before Surgery  Take all your medications as schedule prior to surgery .    Do not take any ibuprofen, aleve, aspirin for the week prior.

## 2020-09-04 NOTE — Clinical Note
Patient is inquiring about a pre op packet or information from surgeon. Could you call or send her something, if needed?

## 2020-09-05 ASSESSMENT — ASTHMA QUESTIONNAIRES: ACT_TOTALSCORE: 21

## 2020-09-05 ASSESSMENT — ANXIETY QUESTIONNAIRES: GAD7 TOTAL SCORE: 20

## 2020-09-09 ENCOUNTER — PREP FOR PROCEDURE (OUTPATIENT)
Dept: OBGYN | Facility: OTHER | Age: 45
End: 2020-09-09

## 2020-09-09 DIAGNOSIS — Z01.818 PRE-OP EXAM: Primary | ICD-10-CM

## 2020-09-09 DIAGNOSIS — N93.9 ABNORMAL UTERINE BLEEDING: ICD-10-CM

## 2020-09-09 DIAGNOSIS — R10.2 PELVIC PAIN IN FEMALE: Primary | ICD-10-CM

## 2020-09-10 ENCOUNTER — PREP FOR PROCEDURE (OUTPATIENT)
Dept: OBGYN | Facility: OTHER | Age: 45
End: 2020-09-10

## 2020-09-10 DIAGNOSIS — N93.9 ABNORMAL UTERINE BLEEDING: ICD-10-CM

## 2020-09-10 DIAGNOSIS — R10.2 PELVIC PAIN IN FEMALE: Primary | ICD-10-CM

## 2020-09-12 ENCOUNTER — TELEPHONE (OUTPATIENT)
Dept: FAMILY MEDICINE | Facility: OTHER | Age: 45
End: 2020-09-12

## 2020-09-12 DIAGNOSIS — Z72.0 TOBACCO ABUSE: ICD-10-CM

## 2020-09-12 DIAGNOSIS — F33.9 DEPRESSION, RECURRENT (H): Primary | ICD-10-CM

## 2020-09-12 NOTE — TELEPHONE ENCOUNTER
Received a PA from [employer] for Bupropion HCI ER (XL) 150 mg tablets. Submitted on CMM. Waiting for a response.

## 2020-09-15 RX ORDER — BUPROPION HYDROCHLORIDE 300 MG/1
300 TABLET ORAL EVERY MORNING
Qty: 90 TABLET | Refills: 3 | Status: SHIPPED | OUTPATIENT
Start: 2020-09-15

## 2020-09-15 NOTE — TELEPHONE ENCOUNTER
Received a DENIAL from [facility] for Bupropion HCI ER (XL) 150 mg tablets.     Forms scanned to Epic.

## 2020-09-16 ENCOUNTER — ANESTHESIA EVENT (OUTPATIENT)
Dept: SURGERY | Facility: HOSPITAL | Age: 45
End: 2020-09-16
Payer: COMMERCIAL

## 2020-09-16 ASSESSMENT — LIFESTYLE VARIABLES: TOBACCO_USE: 1

## 2020-09-16 NOTE — ANESTHESIA PREPROCEDURE EVALUATION
Anesthesia Pre-Procedure Evaluation    Patient: Lashae Middleton   MRN: [id number] : 1975          Preoperative Diagnosis: Pelvic pain in female [R10.2]  Abnormal uterine bleeding [N93.9]    Procedure(s):  LAPAROSCOPIC ASSISTED VAGINAL HYSTERECTOMY  POSSIBLE BILATERAL SALPINGO- OOPHERECTOMY  LAPAROSCOPIC BILATERAL SALPINGECTOMY    Past Medical History:   Diagnosis Date     Abnormal uterine bleeding (AUB)      Past Surgical History:   Procedure Laterality Date     HC ABLATION, ENDOMETRIAL, THERMAL, W/O HYSTEROSCOPIC GUIDANCE       TUBAL LIGATION         Anesthesia Evaluation     . Pt has had prior anesthetic. Type: General           ROS/MED HX    ENT/Pulmonary:     (+)PAT risk factors snores loudly, obese, daytime somnolence, observed stopped breathing tobacco use, Current use 0.5 packs/day  , . .    Neurologic:     (+)migraines,     Cardiovascular:  - neg cardiovascular ROS       METS/Exercise Tolerance:  >4 METS   Hematologic:     (+) Anemia, -      Musculoskeletal:   (+)  other musculoskeletal- TMJ      GI/Hepatic:     (+) GERD Asymptomatic on medication, Inflammatory bowel disease,       Renal/Genitourinary:  - ROS Renal section negative       Endo:     (+) Obesity, .      Psychiatric:     (+) psychiatric history depression      Infectious Disease:  - neg infectious disease ROS       Malignancy:      - no malignancy   Other:    - neg other ROS                      Physical Exam  Normal systems: cardiovascular, pulmonary and dental    Airway   Mallampati: II  TM distance: >3 FB  Neck ROM: full    Dental     Cardiovascular   Rhythm and rate: regular and normal      Pulmonary    breath sounds clear to auscultation            Lab Results   Component Value Date    WBC 7.0 2020    HGB 14.7 2020    HCT 43.2 2020     2020    CRP <2.9 2018     2020    POTASSIUM 4.0 2020    CHLORIDE 110 (H) 2020    CO2 24 2020    BUN 16 2020    CR 0.75  "09/04/2020    GLC 97 09/04/2020    VIVEK 9.0 09/04/2020    ALBUMIN 3.6 09/04/2020    PROTTOTAL 7.4 09/04/2020    ALT 29 09/04/2020    AST 12 09/04/2020    ALKPHOS 84 09/04/2020    BILITOTAL 0.3 09/04/2020    LIPASE 147 05/02/2018    TSH 1.22 08/31/2018    T4 1.07 08/31/2018    HCG Negative 01/01/2020       Preop Vitals  BP Readings from Last 3 Encounters:   09/04/20 122/82   04/15/20 120/76   02/21/20 112/86    Pulse Readings from Last 3 Encounters:   09/04/20 77   04/15/20 78   02/21/20 85      Resp Readings from Last 3 Encounters:   01/27/20 16   01/07/20 14   01/01/20 14    SpO2 Readings from Last 3 Encounters:   09/04/20 98%   04/15/20 98%   02/21/20 98%      Temp Readings from Last 1 Encounters:   02/21/20 98.3  F (36.8  C) (Tympanic)    Ht Readings from Last 1 Encounters:   09/04/20 1.626 m (5' 4\")      Wt Readings from Last 1 Encounters:   09/04/20 99.8 kg (220 lb)    Estimated body mass index is 37.76 kg/m  as calculated from the following:    Height as of 9/4/20: 1.626 m (5' 4\").    Weight as of 9/4/20: 99.8 kg (220 lb).       Anesthesia Plan      History & Physical Review  History and physical reviewed and following examination; no interval change.    ASA Status:  3 .    NPO Status:  > 8 hours    Plan for General with Intravenous and Propofol induction. Maintenance will be Balanced.    PONV prophylaxis:  Ondansetron (or other 5HT-3) and Dexamethasone or Solumedrol         Postoperative Care  Postoperative pain management:  IV analgesics.      Consents  Anesthetic plan, risks, benefits and alternatives discussed with:  Patient..                 MYRA Maldonado CRNA  "

## 2020-09-19 ENCOUNTER — OFFICE VISIT (OUTPATIENT)
Dept: FAMILY MEDICINE | Facility: OTHER | Age: 45
End: 2020-09-19
Attending: FAMILY MEDICINE
Payer: COMMERCIAL

## 2020-09-19 DIAGNOSIS — Z20.822 COVID-19 RULED OUT: Primary | ICD-10-CM

## 2020-09-19 PROCEDURE — U0003 INFECTIOUS AGENT DETECTION BY NUCLEIC ACID (DNA OR RNA); SEVERE ACUTE RESPIRATORY SYNDROME CORONAVIRUS 2 (SARS-COV-2) (CORONAVIRUS DISEASE [COVID-19]), AMPLIFIED PROBE TECHNIQUE, MAKING USE OF HIGH THROUGHPUT TECHNOLOGIES AS DESCRIBED BY CMS-2020-01-R: HCPCS | Mod: ZL | Performed by: FAMILY MEDICINE

## 2020-09-20 ENCOUNTER — MYC MEDICAL ADVICE (OUTPATIENT)
Dept: OBGYN | Facility: OTHER | Age: 45
End: 2020-09-20

## 2020-09-20 LAB
SARS-COV-2 RNA SPEC QL NAA+PROBE: NOT DETECTED
SPECIMEN SOURCE: NORMAL

## 2020-09-21 ENCOUNTER — MYC MEDICAL ADVICE (OUTPATIENT)
Dept: OBGYN | Facility: OTHER | Age: 45
End: 2020-09-21

## 2020-09-22 ENCOUNTER — OFFICE VISIT (OUTPATIENT)
Dept: OBGYN | Facility: OTHER | Age: 45
End: 2020-09-22
Attending: OBSTETRICS & GYNECOLOGY
Payer: COMMERCIAL

## 2020-09-22 ENCOUNTER — OFFICE VISIT (OUTPATIENT)
Dept: CHIROPRACTIC MEDICINE | Facility: OTHER | Age: 45
End: 2020-09-22
Attending: CHIROPRACTOR
Payer: COMMERCIAL

## 2020-09-22 VITALS
WEIGHT: 219 LBS | DIASTOLIC BLOOD PRESSURE: 72 MMHG | HEIGHT: 64 IN | BODY MASS INDEX: 37.39 KG/M2 | HEART RATE: 68 BPM | SYSTOLIC BLOOD PRESSURE: 110 MMHG

## 2020-09-22 DIAGNOSIS — Z01.818 PRE-OP EXAM: ICD-10-CM

## 2020-09-22 DIAGNOSIS — M99.01 SEGMENTAL AND SOMATIC DYSFUNCTION OF CERVICAL REGION: ICD-10-CM

## 2020-09-22 DIAGNOSIS — M99.03 SEGMENTAL AND SOMATIC DYSFUNCTION OF LUMBAR REGION: Primary | ICD-10-CM

## 2020-09-22 DIAGNOSIS — M54.50 ACUTE BILATERAL LOW BACK PAIN WITHOUT SCIATICA: ICD-10-CM

## 2020-09-22 DIAGNOSIS — N93.9 ABNORMAL UTERINE BLEEDING: ICD-10-CM

## 2020-09-22 DIAGNOSIS — M99.02 SEGMENTAL AND SOMATIC DYSFUNCTION OF THORACIC REGION: ICD-10-CM

## 2020-09-22 DIAGNOSIS — R10.2 PELVIC PAIN IN FEMALE: Primary | ICD-10-CM

## 2020-09-22 PROCEDURE — 98941 CHIROPRACT MANJ 3-4 REGIONS: CPT | Mod: AT | Performed by: CHIROPRACTOR

## 2020-09-22 PROCEDURE — G0463 HOSPITAL OUTPT CLINIC VISIT: HCPCS

## 2020-09-22 PROCEDURE — 99213 OFFICE O/P EST LOW 20 MIN: CPT | Performed by: OBSTETRICS & GYNECOLOGY

## 2020-09-22 ASSESSMENT — PAIN SCALES - GENERAL: PAINLEVEL: NO PAIN (0)

## 2020-09-22 ASSESSMENT — MIFFLIN-ST. JEOR: SCORE: 1613.38

## 2020-09-22 NOTE — NURSING NOTE
"Chief Complaint   Patient presents with     Follow Up     discuss surgery       Initial /72   Pulse 68   Ht 1.626 m (5' 4\")   Wt 99.3 kg (219 lb)   BMI 37.59 kg/m   Estimated body mass index is 37.59 kg/m  as calculated from the following:    Height as of this encounter: 1.626 m (5' 4\").    Weight as of this encounter: 99.3 kg (219 lb).  Medication Reconciliation: complete  Rose Dewitt LPN    "

## 2020-09-22 NOTE — PROGRESS NOTES
S: F/u pelvic pain, AUB.  Pt presents prior to planned procedure for preoperative consent.    No new complainants.  See my prior evals.  Covid. Neg.  She has continued to have intermittent vaginal bleeding and pelvic pain,           Patient Active Problem List   Diagnosis     Abnormal uterine bleeding--S/P ENDOMETRIAL ABLATION, no  bleeding since--10/2017     Depression, recurrent (H)     Hx of antibiotic allergy    PCN,  SULFA     Migraine syndrome     Obesity, Class II, BMI 35-39.9     TMJ (temporomandibular joint syndrome)     Abdominal pain, epigastric-REFLUX-Protonix-8/2018     Vaginal discharge with odor--9/2018     Irritable bowel syndrome with both constipation and diarrhea-LACTOSE iintolerant--8/2018     Pelvic pain in female     Urinary frequency--UTI--9/2018     Dyspareunia, female-after--9/2018     S/P endometrial ablation     Abnormal uterine bleeding (AUB)            Past Medical History:   Diagnosis Date     Abnormal uterine bleeding (AUB)             Past Surgical History:   Procedure Laterality Date     HC ABLATION, ENDOMETRIAL, THERMAL, W/O HYSTEROSCOPIC GUIDANCE       TUBAL LIGATION  2011            Social History     Tobacco Use     Smoking status: Current Some Day Smoker     Packs/day: 0.50     Years: 30.00     Pack years: 15.00     Types: Cigarettes     Smokeless tobacco: Former User     Tobacco comment: 2-3 cigarettes a day   Substance Use Topics     Alcohol use: Yes     Comment: wine coolers on occasion            Family History   Problem Relation Age of Onset     Other - See Comments Mother         endometriosis               Allergies   Allergen Reactions     Sulfa Drugs Anaphylaxis     Latex Hives     No Clinical Screening - See Comments      All cillins      Penicillins      Aspirin Rash     Sumatriptan Rash            Current Outpatient Medications   Medication Sig Dispense Refill     albuterol (PROAIR HFA/PROVENTIL HFA/VENTOLIN HFA) 108 (90 Base) MCG/ACT inhaler Inhale 2 puffs into the  "lungs every 6 hours as needed for shortness of breath / dyspnea or wheezing 1 Inhaler 3     buPROPion (WELLBUTRIN XL) 150 MG 24 hr tablet Take 2 tablets (300 mg) by mouth every morning 180 tablet 0     buPROPion (WELLBUTRIN XL) 150 MG 24 hr tablet TAKE 1 TABLET BY MOUTH ONCE DAILY IN THE MORNING 30 tablet 0     buPROPion (WELLBUTRIN XL) 300 MG 24 hr tablet Take 1 tablet (300 mg) by mouth every morning 90 tablet 3     EPINEPHrine (EPIPEN/ADRENACLICK/OR ANY BX GENERIC EQUIV) 0.3 MG/0.3ML injection 2-pack Inject 0.3 mLs (0.3 mg) into the muscle as needed for anaphylaxis 0.6 mL 0     fluticasone (FLONASE) 50 MCG/ACT nasal spray Spray 1-2 sprays into both nostrils daily 11.1 mL 3     pantoprazole 20 MG PO EC tablet TAKE 1 TABLET BY MOUTH ONCE DAILY 30-60  MINUTES  BEFORE  A  MEAL 60 tablet 0     rizatriptan (MAXALT) 5 MG tablet TAKE 1 TO 2 TABLETS BY MOUTH AT ONSET OF HEADACHE FOR MIGRAINE. MAY REPEAT IN 2 HOURS. MAX OF 6 TABLETS IN 24 HOURS 30 tablet 1          Review Of Systems  Constitutional:  Denies fever  GI/ negative except as noted per hpi    O:   /72   Pulse 68   Ht 1.626 m (5' 4\")   Wt 99.3 kg (219 lb)   BMI 37.59 kg/m    Gen:  NAD, A and O         A:  Chronic pelvic pain/AUB    P:  Discussed planned procedure of LAVH, bilateral salpingectomy, indicated procedures including removal of one or both ovaries if diseased or endometriosis present.  Reviewed goals, risks, alternatives for planned procedure.  Including risk of bleeding, infection, damage to nerves, blood vessels, bowel and bladder. Discussed recovery period and expected discomfort. Discussed risks off oophorectomy, menopause, hormone replacement.  All questions were answered.  15  minutes were spent with the patient with greater than 50% of the visit spent in face-to-face counseling and coordination of care.            "

## 2020-09-23 ENCOUNTER — ANESTHESIA (OUTPATIENT)
Dept: SURGERY | Facility: HOSPITAL | Age: 45
End: 2020-09-23
Payer: COMMERCIAL

## 2020-09-23 ENCOUNTER — HOSPITAL ENCOUNTER (OUTPATIENT)
Facility: HOSPITAL | Age: 45
Discharge: HOME | End: 2020-09-24
Attending: OBSTETRICS & GYNECOLOGY | Admitting: OBSTETRICS & GYNECOLOGY
Payer: COMMERCIAL

## 2020-09-23 DIAGNOSIS — Z90.710 S/P HYSTERECTOMY: Primary | ICD-10-CM

## 2020-09-23 DIAGNOSIS — N93.9 ABNORMAL UTERINE BLEEDING: ICD-10-CM

## 2020-09-23 DIAGNOSIS — R10.2 PELVIC PAIN IN FEMALE: ICD-10-CM

## 2020-09-23 LAB
ABO + RH BLD: NORMAL
ABO + RH BLD: NORMAL
BLD GP AB SCN SERPL QL: NORMAL
BLOOD BANK CMNT PATIENT-IMP: NORMAL
BLOOD BANK CMNT PATIENT-IMP: NORMAL
HCG SERPL QL: NEGATIVE
SPECIMEN EXP DATE BLD: NORMAL

## 2020-09-23 PROCEDURE — 71000015 ZZH RECOVERY PHASE 1 LEVEL 2 EA ADDTL HR: Performed by: OBSTETRICS & GYNECOLOGY

## 2020-09-23 PROCEDURE — 84703 CHORIONIC GONADOTROPIN ASSAY: CPT | Performed by: OBSTETRICS & GYNECOLOGY

## 2020-09-23 PROCEDURE — 25800030 ZZH RX IP 258 OP 636: Performed by: OBSTETRICS & GYNECOLOGY

## 2020-09-23 PROCEDURE — 36000063 ZZH SURGERY LEVEL 4 EA 15 ADDTL MIN: Performed by: OBSTETRICS & GYNECOLOGY

## 2020-09-23 PROCEDURE — 25000132 ZZH RX MED GY IP 250 OP 250 PS 637: Performed by: OBSTETRICS & GYNECOLOGY

## 2020-09-23 PROCEDURE — 37000008 ZZH ANESTHESIA TECHNICAL FEE, 1ST 30 MIN: Performed by: OBSTETRICS & GYNECOLOGY

## 2020-09-23 PROCEDURE — 25000125 ZZHC RX 250: Performed by: OBSTETRICS & GYNECOLOGY

## 2020-09-23 PROCEDURE — 25000128 H RX IP 250 OP 636: Performed by: NURSE ANESTHETIST, CERTIFIED REGISTERED

## 2020-09-23 PROCEDURE — 86850 RBC ANTIBODY SCREEN: CPT | Performed by: OBSTETRICS & GYNECOLOGY

## 2020-09-23 PROCEDURE — 58552 LAPARO-VAG HYST INCL T/O: CPT | Mod: AS | Performed by: NURSE PRACTITIONER

## 2020-09-23 PROCEDURE — 36415 COLL VENOUS BLD VENIPUNCTURE: CPT | Performed by: OBSTETRICS & GYNECOLOGY

## 2020-09-23 PROCEDURE — 88342 IMHCHEM/IMCYTCHM 1ST ANTB: CPT | Mod: TC | Performed by: OBSTETRICS & GYNECOLOGY

## 2020-09-23 PROCEDURE — 40000275 ZZH STATISTIC RCP TIME EA 10 MIN

## 2020-09-23 PROCEDURE — 71000014 ZZH RECOVERY PHASE 1 LEVEL 2 FIRST HR: Performed by: OBSTETRICS & GYNECOLOGY

## 2020-09-23 PROCEDURE — 40000305 ZZH STATISTIC PRE PROC ASSESS I: Performed by: OBSTETRICS & GYNECOLOGY

## 2020-09-23 PROCEDURE — 27110028 ZZH OR GENERAL SUPPLY NON-STERILE: Performed by: OBSTETRICS & GYNECOLOGY

## 2020-09-23 PROCEDURE — 25000128 H RX IP 250 OP 636: Performed by: OBSTETRICS & GYNECOLOGY

## 2020-09-23 PROCEDURE — 88307 TISSUE EXAM BY PATHOLOGIST: CPT | Mod: TC | Performed by: OBSTETRICS & GYNECOLOGY

## 2020-09-23 PROCEDURE — 37000009 ZZH ANESTHESIA TECHNICAL FEE, EACH ADDTL 15 MIN: Performed by: OBSTETRICS & GYNECOLOGY

## 2020-09-23 PROCEDURE — 86901 BLOOD TYPING SEROLOGIC RH(D): CPT | Performed by: OBSTETRICS & GYNECOLOGY

## 2020-09-23 PROCEDURE — 86900 BLOOD TYPING SEROLOGIC ABO: CPT | Performed by: OBSTETRICS & GYNECOLOGY

## 2020-09-23 PROCEDURE — 58552 LAPARO-VAG HYST INCL T/O: CPT | Performed by: OBSTETRICS & GYNECOLOGY

## 2020-09-23 PROCEDURE — 25800030 ZZH RX IP 258 OP 636: Performed by: NURSE ANESTHETIST, CERTIFIED REGISTERED

## 2020-09-23 PROCEDURE — 25000125 ZZHC RX 250: Performed by: NURSE ANESTHETIST, CERTIFIED REGISTERED

## 2020-09-23 PROCEDURE — 36000093 ZZH SURGERY LEVEL 4 1ST 30 MIN: Performed by: OBSTETRICS & GYNECOLOGY

## 2020-09-23 PROCEDURE — 27210794 ZZH OR GENERAL SUPPLY STERILE: Performed by: OBSTETRICS & GYNECOLOGY

## 2020-09-23 PROCEDURE — 58552 LAPARO-VAG HYST INCL T/O: CPT | Performed by: NURSE ANESTHETIST, CERTIFIED REGISTERED

## 2020-09-23 RX ORDER — LIDOCAINE HYDROCHLORIDE 20 MG/ML
INJECTION, SOLUTION INFILTRATION; PERINEURAL PRN
Status: DISCONTINUED | OUTPATIENT
Start: 2020-09-23 | End: 2020-09-23

## 2020-09-23 RX ORDER — NALOXONE HYDROCHLORIDE 0.4 MG/ML
.1-.4 INJECTION, SOLUTION INTRAMUSCULAR; INTRAVENOUS; SUBCUTANEOUS
Status: DISCONTINUED | OUTPATIENT
Start: 2020-09-23 | End: 2020-09-24 | Stop reason: HOSPADM

## 2020-09-23 RX ORDER — ONDANSETRON 2 MG/ML
4 INJECTION INTRAMUSCULAR; INTRAVENOUS EVERY 6 HOURS PRN
Status: DISCONTINUED | OUTPATIENT
Start: 2020-09-23 | End: 2020-09-24 | Stop reason: HOSPADM

## 2020-09-23 RX ORDER — DEXAMETHASONE SODIUM PHOSPHATE 10 MG/ML
INJECTION, SOLUTION INTRAMUSCULAR; INTRAVENOUS PRN
Status: DISCONTINUED | OUTPATIENT
Start: 2020-09-23 | End: 2020-09-23

## 2020-09-23 RX ORDER — SODIUM CHLORIDE, SODIUM LACTATE, POTASSIUM CHLORIDE, CALCIUM CHLORIDE 600; 310; 30; 20 MG/100ML; MG/100ML; MG/100ML; MG/100ML
INJECTION, SOLUTION INTRAVENOUS CONTINUOUS
Status: DISCONTINUED | OUTPATIENT
Start: 2020-09-23 | End: 2020-09-23 | Stop reason: HOSPADM

## 2020-09-23 RX ORDER — PROPOFOL 10 MG/ML
INJECTION, EMULSION INTRAVENOUS PRN
Status: DISCONTINUED | OUTPATIENT
Start: 2020-09-23 | End: 2020-09-23

## 2020-09-23 RX ORDER — PROCHLORPERAZINE MALEATE 10 MG
10 TABLET ORAL EVERY 6 HOURS PRN
Status: DISCONTINUED | OUTPATIENT
Start: 2020-09-23 | End: 2020-09-24 | Stop reason: HOSPADM

## 2020-09-23 RX ORDER — PANTOPRAZOLE SODIUM 20 MG/1
20 TABLET, DELAYED RELEASE ORAL
Status: DISCONTINUED | OUTPATIENT
Start: 2020-09-23 | End: 2020-09-24 | Stop reason: HOSPADM

## 2020-09-23 RX ORDER — ONDANSETRON 4 MG/1
4 TABLET, ORALLY DISINTEGRATING ORAL EVERY 6 HOURS PRN
Status: DISCONTINUED | OUTPATIENT
Start: 2020-09-23 | End: 2020-09-24 | Stop reason: HOSPADM

## 2020-09-23 RX ORDER — KETAMINE HYDROCHLORIDE 10 MG/ML
INJECTION INTRAMUSCULAR; INTRAVENOUS PRN
Status: DISCONTINUED | OUTPATIENT
Start: 2020-09-23 | End: 2020-09-23

## 2020-09-23 RX ORDER — OXYCODONE HYDROCHLORIDE 5 MG/1
5-10 TABLET ORAL
Status: DISCONTINUED | OUTPATIENT
Start: 2020-09-23 | End: 2020-09-24 | Stop reason: HOSPADM

## 2020-09-23 RX ORDER — ONDANSETRON 2 MG/ML
INJECTION INTRAMUSCULAR; INTRAVENOUS PRN
Status: DISCONTINUED | OUTPATIENT
Start: 2020-09-23 | End: 2020-09-23

## 2020-09-23 RX ORDER — KETOROLAC TROMETHAMINE 30 MG/ML
30 INJECTION, SOLUTION INTRAMUSCULAR; INTRAVENOUS EVERY 6 HOURS
Status: DISCONTINUED | OUTPATIENT
Start: 2020-09-23 | End: 2020-09-24 | Stop reason: HOSPADM

## 2020-09-23 RX ORDER — ACETAMINOPHEN 325 MG/1
650 TABLET ORAL EVERY 6 HOURS PRN
Status: DISCONTINUED | OUTPATIENT
Start: 2020-09-23 | End: 2020-09-24 | Stop reason: HOSPADM

## 2020-09-23 RX ORDER — IBUPROFEN 600 MG/1
600 TABLET, FILM COATED ORAL EVERY 6 HOURS PRN
Status: DISCONTINUED | OUTPATIENT
Start: 2020-09-24 | End: 2020-09-24 | Stop reason: HOSPADM

## 2020-09-23 RX ORDER — CALCIUM CARBONATE 500 MG/1
1000 TABLET, CHEWABLE ORAL 4 TIMES DAILY PRN
Status: DISCONTINUED | OUTPATIENT
Start: 2020-09-23 | End: 2020-09-24 | Stop reason: HOSPADM

## 2020-09-23 RX ORDER — CLINDAMYCIN PHOSPHATE 900 MG/50ML
900 INJECTION, SOLUTION INTRAVENOUS
Status: COMPLETED | OUTPATIENT
Start: 2020-09-23 | End: 2020-09-23

## 2020-09-23 RX ORDER — SODIUM CHLORIDE, SODIUM LACTATE, POTASSIUM CHLORIDE, CALCIUM CHLORIDE 600; 310; 30; 20 MG/100ML; MG/100ML; MG/100ML; MG/100ML
INJECTION, SOLUTION INTRAVENOUS CONTINUOUS
Status: DISCONTINUED | OUTPATIENT
Start: 2020-09-23 | End: 2020-09-24 | Stop reason: HOSPADM

## 2020-09-23 RX ORDER — LIDOCAINE 40 MG/G
CREAM TOPICAL
Status: DISCONTINUED | OUTPATIENT
Start: 2020-09-23 | End: 2020-09-23 | Stop reason: HOSPADM

## 2020-09-23 RX ORDER — FENTANYL CITRATE 50 UG/ML
INJECTION, SOLUTION INTRAMUSCULAR; INTRAVENOUS PRN
Status: DISCONTINUED | OUTPATIENT
Start: 2020-09-23 | End: 2020-09-23

## 2020-09-23 RX ORDER — HYDROMORPHONE HYDROCHLORIDE 1 MG/ML
0.2 INJECTION, SOLUTION INTRAMUSCULAR; INTRAVENOUS; SUBCUTANEOUS
Status: DISCONTINUED | OUTPATIENT
Start: 2020-09-23 | End: 2020-09-24 | Stop reason: HOSPADM

## 2020-09-23 RX ORDER — KETOROLAC TROMETHAMINE 30 MG/ML
INJECTION, SOLUTION INTRAMUSCULAR; INTRAVENOUS PRN
Status: DISCONTINUED | OUTPATIENT
Start: 2020-09-23 | End: 2020-09-23

## 2020-09-23 RX ORDER — CLINDAMYCIN PHOSPHATE 900 MG/50ML
900 INJECTION, SOLUTION INTRAVENOUS SEE ADMIN INSTRUCTIONS
Status: DISCONTINUED | OUTPATIENT
Start: 2020-09-23 | End: 2020-09-23 | Stop reason: HOSPADM

## 2020-09-23 RX ORDER — LIDOCAINE 40 MG/G
CREAM TOPICAL
Status: DISCONTINUED | OUTPATIENT
Start: 2020-09-23 | End: 2020-09-24 | Stop reason: HOSPADM

## 2020-09-23 RX ORDER — ONDANSETRON 2 MG/ML
4 INJECTION INTRAMUSCULAR; INTRAVENOUS EVERY 30 MIN PRN
Status: DISCONTINUED | OUTPATIENT
Start: 2020-09-23 | End: 2020-09-23 | Stop reason: HOSPADM

## 2020-09-23 RX ORDER — PHENAZOPYRIDINE HYDROCHLORIDE 100 MG/1
200 TABLET, FILM COATED ORAL ONCE
Status: COMPLETED | OUTPATIENT
Start: 2020-09-23 | End: 2020-09-23

## 2020-09-23 RX ORDER — HYDROXYZINE HYDROCHLORIDE 50 MG/ML
100 INJECTION, SOLUTION INTRAMUSCULAR EVERY 6 HOURS PRN
Status: DISCONTINUED | OUTPATIENT
Start: 2020-09-23 | End: 2020-09-24 | Stop reason: HOSPADM

## 2020-09-23 RX ORDER — ALBUTEROL SULFATE 90 UG/1
2 AEROSOL, METERED RESPIRATORY (INHALATION) EVERY 6 HOURS PRN
Status: DISCONTINUED | OUTPATIENT
Start: 2020-09-23 | End: 2020-09-24 | Stop reason: HOSPADM

## 2020-09-23 RX ORDER — METOCLOPRAMIDE 5 MG/1
10 TABLET ORAL EVERY 6 HOURS PRN
Status: DISCONTINUED | OUTPATIENT
Start: 2020-09-23 | End: 2020-09-24 | Stop reason: HOSPADM

## 2020-09-23 RX ORDER — METOCLOPRAMIDE HYDROCHLORIDE 5 MG/ML
10 INJECTION INTRAMUSCULAR; INTRAVENOUS EVERY 6 HOURS PRN
Status: DISCONTINUED | OUTPATIENT
Start: 2020-09-23 | End: 2020-09-24 | Stop reason: HOSPADM

## 2020-09-23 RX ORDER — FENTANYL CITRATE 50 UG/ML
25-50 INJECTION, SOLUTION INTRAMUSCULAR; INTRAVENOUS
Status: DISCONTINUED | OUTPATIENT
Start: 2020-09-23 | End: 2020-09-23 | Stop reason: HOSPADM

## 2020-09-23 RX ORDER — ACETAMINOPHEN 325 MG/1
975 TABLET ORAL ONCE
Status: COMPLETED | OUTPATIENT
Start: 2020-09-23 | End: 2020-09-23

## 2020-09-23 RX ORDER — HYDROMORPHONE HYDROCHLORIDE 1 MG/ML
.3-.5 INJECTION, SOLUTION INTRAMUSCULAR; INTRAVENOUS; SUBCUTANEOUS EVERY 5 MIN PRN
Status: DISCONTINUED | OUTPATIENT
Start: 2020-09-23 | End: 2020-09-23 | Stop reason: HOSPADM

## 2020-09-23 RX ORDER — ONDANSETRON 4 MG/1
4 TABLET, ORALLY DISINTEGRATING ORAL EVERY 30 MIN PRN
Status: DISCONTINUED | OUTPATIENT
Start: 2020-09-23 | End: 2020-09-23 | Stop reason: HOSPADM

## 2020-09-23 RX ADMIN — PANTOPRAZOLE SODIUM 20 MG: 20 TABLET, DELAYED RELEASE ORAL at 16:10

## 2020-09-23 RX ADMIN — HYDROMORPHONE HYDROCHLORIDE 0.5 MG: 1 INJECTION, SOLUTION INTRAMUSCULAR; INTRAVENOUS; SUBCUTANEOUS at 13:55

## 2020-09-23 RX ADMIN — KETOROLAC TROMETHAMINE 30 MG: 30 INJECTION, SOLUTION INTRAMUSCULAR at 12:47

## 2020-09-23 RX ADMIN — ROCURONIUM BROMIDE 5 MG: 10 INJECTION INTRAVENOUS at 11:16

## 2020-09-23 RX ADMIN — CLINDAMYCIN IN 5 PERCENT DEXTROSE 900 MG: 18 INJECTION, SOLUTION INTRAVENOUS at 11:23

## 2020-09-23 RX ADMIN — SODIUM CHLORIDE, POTASSIUM CHLORIDE, SODIUM LACTATE AND CALCIUM CHLORIDE: 600; 310; 30; 20 INJECTION, SOLUTION INTRAVENOUS at 15:05

## 2020-09-23 RX ADMIN — PHENAZOPYRIDINE HYDROCHLORIDE 200 MG: 100 TABLET, FILM COATED ORAL at 10:25

## 2020-09-23 RX ADMIN — SUGAMMADEX 400 MG: 100 INJECTION, SOLUTION INTRAVENOUS at 12:40

## 2020-09-23 RX ADMIN — TRANEXAMIC ACID 1 G: 1 INJECTION, SOLUTION INTRAVENOUS at 11:10

## 2020-09-23 RX ADMIN — ROCURONIUM BROMIDE 10 MG: 10 INJECTION INTRAVENOUS at 12:22

## 2020-09-23 RX ADMIN — FENTANYL CITRATE 50 MCG: 50 INJECTION, SOLUTION INTRAMUSCULAR; INTRAVENOUS at 13:37

## 2020-09-23 RX ADMIN — ONDANSETRON 4 MG: 2 INJECTION INTRAMUSCULAR; INTRAVENOUS at 11:10

## 2020-09-23 RX ADMIN — FENTANYL CITRATE 50 MCG: 50 INJECTION, SOLUTION INTRAMUSCULAR; INTRAVENOUS at 13:24

## 2020-09-23 RX ADMIN — MIDAZOLAM 2 MG: 1 INJECTION INTRAMUSCULAR; INTRAVENOUS at 11:10

## 2020-09-23 RX ADMIN — KETOROLAC TROMETHAMINE 30 MG: 30 INJECTION, SOLUTION INTRAMUSCULAR at 18:49

## 2020-09-23 RX ADMIN — ACETAMINOPHEN 975 MG: 325 TABLET, FILM COATED ORAL at 10:25

## 2020-09-23 RX ADMIN — Medication 100 MG: at 11:16

## 2020-09-23 RX ADMIN — SODIUM CHLORIDE, POTASSIUM CHLORIDE, SODIUM LACTATE AND CALCIUM CHLORIDE: 600; 310; 30; 20 INJECTION, SOLUTION INTRAVENOUS at 21:46

## 2020-09-23 RX ADMIN — FENTANYL CITRATE 100 MCG: 50 INJECTION, SOLUTION INTRAMUSCULAR; INTRAVENOUS at 11:10

## 2020-09-23 RX ADMIN — HYDROXYZINE HYDROCHLORIDE 100 MG: 50 INJECTION, SOLUTION INTRAMUSCULAR at 18:54

## 2020-09-23 RX ADMIN — ACETAMINOPHEN 650 MG: 325 TABLET, FILM COATED ORAL at 16:10

## 2020-09-23 RX ADMIN — FENTANYL CITRATE 50 MCG: 50 INJECTION, SOLUTION INTRAMUSCULAR; INTRAVENOUS at 12:10

## 2020-09-23 RX ADMIN — FENTANYL CITRATE 50 MCG: 50 INJECTION, SOLUTION INTRAMUSCULAR; INTRAVENOUS at 12:34

## 2020-09-23 RX ADMIN — GENTAMICIN SULFATE 140 MG: 40 INJECTION, SOLUTION INTRAMUSCULAR; INTRAVENOUS at 11:23

## 2020-09-23 RX ADMIN — ACETAMINOPHEN 650 MG: 325 TABLET, FILM COATED ORAL at 21:53

## 2020-09-23 RX ADMIN — OXYCODONE HYDROCHLORIDE 5 MG: 5 TABLET ORAL at 23:52

## 2020-09-23 RX ADMIN — SODIUM CHLORIDE, POTASSIUM CHLORIDE, SODIUM LACTATE AND CALCIUM CHLORIDE: 600; 310; 30; 20 INJECTION, SOLUTION INTRAVENOUS at 10:28

## 2020-09-23 RX ADMIN — LIDOCAINE HYDROCHLORIDE 40 MG: 20 INJECTION, SOLUTION INFILTRATION; PERINEURAL at 11:16

## 2020-09-23 RX ADMIN — ONDANSETRON 4 MG: 2 INJECTION INTRAMUSCULAR; INTRAVENOUS at 12:41

## 2020-09-23 RX ADMIN — KETAMINE HYDROCHLORIDE 30 MG: 10 INJECTION, SOLUTION INTRAMUSCULAR; INTRAVENOUS at 11:16

## 2020-09-23 RX ADMIN — SODIUM CHLORIDE, POTASSIUM CHLORIDE, SODIUM LACTATE AND CALCIUM CHLORIDE: 600; 310; 30; 20 INJECTION, SOLUTION INTRAVENOUS at 14:02

## 2020-09-23 RX ADMIN — ROCURONIUM BROMIDE 45 MG: 10 INJECTION INTRAVENOUS at 11:19

## 2020-09-23 RX ADMIN — DEXAMETHASONE SODIUM PHOSPHATE 10 MG: 10 INJECTION, SOLUTION INTRAMUSCULAR; INTRAVENOUS at 11:19

## 2020-09-23 RX ADMIN — PROPOFOL 150 MG: 10 INJECTION, EMULSION INTRAVENOUS at 11:16

## 2020-09-23 ASSESSMENT — MIFFLIN-ST. JEOR: SCORE: 1608.84

## 2020-09-23 NOTE — PLAN OF CARE
Pt. To floor via stretcher from PACU @ 1505, transferred to bed via slideboard with staff assistance. Pt. oriented to room, call light within reach. Pain 2/10 at time of arrival to unit with occasional abdominal cramping/aching, treated with repostion, rest & Tylenol as documented on MAR. VS & assessment as documented, IV infusing without difficulty, linares cath patent, draining clear orange urine.  x3 Trocar sites visible on abdomen, showing no s/sx of infection or drainange,closed with surgical glue.  Peripad to vaginal area with no noted bleeding.  Pt. tolerated dinner, Daughter at bedside visiting. Will continue to monitor. Pt. denies needs at this time.

## 2020-09-23 NOTE — OP NOTE
[facility]    Pre-operative diagnosis: Pelvic pain in female [R10.2]  Abnormal uterine bleeding [N93.9]   Post-operative diagnosis Same, Pathology pending   Procedure: Laparoscopic Assisted Vaginal Hysterectomy.  Bilateral Salpingectomy.  Cystoscopy.  Lanier's Culdoplasty   Surgeon:  Assistant: MD Jeanie Tam NP (assistance required for retraction, exposure, instrument handling, and wound closure)     Anesthesia: General    Estimated blood loss: Less than 100 ml   Blood transfusion: No transfusion was given during surgery   Drains: Parsons catheter   Specimens: Uterus and fallopian tubes.     Findings: Mild pelvic relaxation.   On laparoscopy there was  normal pelvic/abdominal anatomy.   Ovaries appeared normal and no evidence of endometriosis.  S/p BTO.   On cystoscopy there was no evidence of sutures or perforation and bilateral ureteral jets were noted post procedure. Bladder mucosa appeared somewhat hyperemic with a few superficial hemorrhagic spots.    Complications: None   Condition: Stable         OPERATIVE DICTATION: The patient was brought to the operating room and uneventfully placed under general anesthesia. She was prepped and draped in the dorsal lithotomy position and her bladder drained. The cervix was visualized with a weighted speculum and grasped anteriorly with a fine-tooth tenaculum. The cervix was gently dilated with Petty dilators and a uterine manipulating device placed. We then changed gloves and proceeded to the abdominal portion of the case. A 5 mm infraumbilical skin incision was performed with a scalpel. A Veress needle was introduced without difficulty and a water drop test performed. The abdomen was insufflated with several liters of carbon dioxide. A 5 mm trocar and the laparoscope were introduced. Visualization was excellent. Findings were as described above. Next, two lower lateral 5 mm trocars were placed under direct visualization. After initial  exploration, the uterus was elevated. The left fallopian tube was elevated and the mesosalpinx was transected with the LigaSure bipolar cutting cautery device. The dissection was continued down through the utero-ovarian, round and broad ligament complexes. A bladder flap was initiated using sharp and blunt dissection and the uterine artery skeletonized within the cardinal ligament. The uterine artery was cauterized with the LigaSure. The same procedure was repeated on the patient's right side and the bladder flap was completed in the midline. At this point, the pelvis was checked and found to be hemostatic. We then proceeded to the vaginal portion of the case. Excess carbon dioxide was expressed from the abdomen but the trocars left in place. The patient was placed in the high lithotomy position. The uterine manipulating device was removed and the cervix visualized with a weighted speculum. The cervix was grasped with two fine-toothed tenaculum. A circumferential cervical incision was performed with a scalpel. Posterior colpotomy was performed sharply without difficulty. The uterosacral ligaments were clamped, cut and suture ligated with 0 Vicryl. The bladder was then dissected anteriorly off the lower uterine segment and cervix and anterior colpotomy performed. A Josue retractor was placed to displace the bladder superiorly. The remaining cardinal ligament complexes were clamped, cut and suture ligated with 0 Vicryl. The uterus was removed. A pack was placed to displace the bowel out of the pelvis. An external Lanier suture was performed in the usual fashion using 0 Vicryl and plicating the uterosacral ligaments high in the pelvis. The Lanier's suture was tagged. A reperitonealizing stitch of 0 Vicryl was placed in a pursestring fashion incorporating the uterosacral ligament into the closure. The packing was removed and the pursestring tied. The vaginal cuff was closed with interrupted 2-0 Vicryl sutures with the  uterosacral ligament complexes being incorporated it into the vaginal angle sutures. The cuff was irrigated and checked for hemostasis. The Lanier's suture was tied with resulting excellent apical vaginal support. Cystoscopy was then performed using a 70-degree rigid cystoscope with normal saline as distention media. Visualization was excellent. Bilateral ureteral jets were noted. There is no evidence of bladder perforation or suture. The cystoscope was withdrawn and a Parsons catheter placed. We then changed gloves and re-proceeded with laparoscopy. The abdomen was reinsufflated with carbon dioxide. The pelvis was irrigated and found to be hemostatic. The trocars were then removed under direct visualization and we proceeded to closure. The subcutaneous spaces were irrigated and checked for hemostasis. The skin incisions were closed with surgical glue. There were no complications and the patient was transferred to the recovery room in excellent stable condition.   Tirso Gomez MD  9/23/2020  11:21 AM

## 2020-09-23 NOTE — ANESTHESIA POSTPROCEDURE EVALUATION
Patient: Lashae Middleton    Procedure(s):  LAPAROSCOPIC ASSISTED VAGINAL HYSTERECTOMY  LAPAROSCOPIC BILATERAL SALPINGECTOMY    Diagnosis:Pelvic pain in female [R10.2]  Abnormal uterine bleeding [N93.9]  Diagnosis Additional Information: No value filed.    Anesthesia Type:  General    Note:  Anesthesia Post Evaluation    Patient location during evaluation: Bedside  Patient participation: Able to fully participate in evaluation  Level of consciousness: awake  Pain management: adequate  Airway patency: patent  Cardiovascular status: acceptable  Respiratory status: acceptable and room air  Hydration status: acceptable  PONV: none     Anesthetic complications: None          Last vitals:  Vitals:    09/23/20 1532 09/23/20 1533 09/23/20 1534   BP:      Pulse:      Resp:      Temp:      SpO2: 96% 98% 97%         Electronically Signed By: MYRA Maldonado CRNA  September 23, 2020  3:40 PM  
increased stride width/decreased eldon

## 2020-09-23 NOTE — PLAN OF CARE
Pt. was up at side of bed & marched in place x2 with standby assist, tolerated well. Pt. feels a little more achy after getting up and out of bed. Pt. would like Vistaril to help her relax to rest and scheduled Toradol to be administered as directed on MAR.

## 2020-09-23 NOTE — ANESTHESIA CARE TRANSFER NOTE
Patient: Lashae Middleton    Procedure(s):  LAPAROSCOPIC ASSISTED VAGINAL HYSTERECTOMY  LAPAROSCOPIC BILATERAL SALPINGECTOMY    Diagnosis: Pelvic pain in female [R10.2]  Abnormal uterine bleeding [N93.9]  Diagnosis Additional Information: No value filed.    Anesthesia Type:   General     Note:  Airway :Nasal Cannula  Patient transferred to:PACU  Handoff Report: Identifed the Patient, Identified the Reponsible Provider, Reviewed the pertinent medical history, Discussed the surgical course, Reviewed Intra-OP anesthesia mangement and issues during anesthesia, Set expectations for post-procedure period and Allowed opportunity for questions and acknowledgement of understanding      Vitals: (Last set prior to Anesthesia Care Transfer)    CRNA VITALS  9/23/2020 1227 - 9/23/2020 1302      9/23/2020             Pulse:  78    SpO2:  100 %    Resp Rate (observed):  14                Electronically Signed By: MYRA Maldonado CRNA  September 23, 2020  1:02 PM

## 2020-09-24 ENCOUNTER — HOSPITAL ENCOUNTER (OUTPATIENT)
Facility: HOSPITAL | Age: 45
End: 2020-09-24
Admitting: OBSTETRICS & GYNECOLOGY
Payer: COMMERCIAL

## 2020-09-24 ENCOUNTER — MYC MEDICAL ADVICE (OUTPATIENT)
Dept: FAMILY MEDICINE | Facility: OTHER | Age: 45
End: 2020-09-24

## 2020-09-24 VITALS
BODY MASS INDEX: 37.22 KG/M2 | WEIGHT: 218 LBS | HEIGHT: 64 IN | DIASTOLIC BLOOD PRESSURE: 49 MMHG | TEMPERATURE: 98.4 F | HEART RATE: 87 BPM | OXYGEN SATURATION: 97 % | RESPIRATION RATE: 16 BRPM | SYSTOLIC BLOOD PRESSURE: 118 MMHG

## 2020-09-24 LAB
ANION GAP SERPL CALCULATED.3IONS-SCNC: 4 MMOL/L (ref 3–14)
BASOPHILS # BLD AUTO: 0 10E9/L (ref 0–0.2)
BASOPHILS NFR BLD AUTO: 0.1 %
BUN SERPL-MCNC: 15 MG/DL (ref 7–30)
CALCIUM SERPL-MCNC: 8.2 MG/DL (ref 8.5–10.1)
CHLORIDE SERPL-SCNC: 112 MMOL/L (ref 94–109)
CO2 SERPL-SCNC: 25 MMOL/L (ref 20–32)
CREAT SERPL-MCNC: 0.66 MG/DL (ref 0.52–1.04)
DIFFERENTIAL METHOD BLD: ABNORMAL
EOSINOPHIL # BLD AUTO: 0 10E9/L (ref 0–0.7)
EOSINOPHIL NFR BLD AUTO: 0 %
ERYTHROCYTE [DISTWIDTH] IN BLOOD BY AUTOMATED COUNT: 13 % (ref 10–15)
GFR SERPL CREATININE-BSD FRML MDRD: >90 ML/MIN/{1.73_M2}
GLUCOSE SERPL-MCNC: 169 MG/DL (ref 70–99)
HCT VFR BLD AUTO: 38.8 % (ref 35–47)
HGB BLD-MCNC: 13 G/DL (ref 11.7–15.7)
IMM GRANULOCYTES # BLD: 0.1 10E9/L (ref 0–0.4)
IMM GRANULOCYTES NFR BLD: 0.4 %
LYMPHOCYTES # BLD AUTO: 1.4 10E9/L (ref 0.8–5.3)
LYMPHOCYTES NFR BLD AUTO: 10.1 %
MCH RBC QN AUTO: 32.6 PG (ref 26.5–33)
MCHC RBC AUTO-ENTMCNC: 33.5 G/DL (ref 31.5–36.5)
MCV RBC AUTO: 97 FL (ref 78–100)
MONOCYTES # BLD AUTO: 0.8 10E9/L (ref 0–1.3)
MONOCYTES NFR BLD AUTO: 5.5 %
NEUTROPHILS # BLD AUTO: 11.6 10E9/L (ref 1.6–8.3)
NEUTROPHILS NFR BLD AUTO: 83.9 %
NRBC # BLD AUTO: 0 10*3/UL
NRBC BLD AUTO-RTO: 0 /100
PLATELET # BLD AUTO: 202 10E9/L (ref 150–450)
POTASSIUM SERPL-SCNC: 4.2 MMOL/L (ref 3.4–5.3)
RBC # BLD AUTO: 3.99 10E12/L (ref 3.8–5.2)
SODIUM SERPL-SCNC: 141 MMOL/L (ref 133–144)
WBC # BLD AUTO: 13.8 10E9/L (ref 4–11)

## 2020-09-24 PROCEDURE — 36415 COLL VENOUS BLD VENIPUNCTURE: CPT | Performed by: OBSTETRICS & GYNECOLOGY

## 2020-09-24 PROCEDURE — 25000128 H RX IP 250 OP 636: Performed by: OBSTETRICS & GYNECOLOGY

## 2020-09-24 PROCEDURE — 25000132 ZZH RX MED GY IP 250 OP 250 PS 637: Performed by: OBSTETRICS & GYNECOLOGY

## 2020-09-24 PROCEDURE — 80048 BASIC METABOLIC PNL TOTAL CA: CPT | Performed by: OBSTETRICS & GYNECOLOGY

## 2020-09-24 PROCEDURE — 85025 COMPLETE CBC W/AUTO DIFF WBC: CPT | Performed by: OBSTETRICS & GYNECOLOGY

## 2020-09-24 RX ORDER — DOCUSATE SODIUM 100 MG/1
100 CAPSULE, LIQUID FILLED ORAL 2 TIMES DAILY
Qty: 60 CAPSULE | Refills: 1 | Status: SHIPPED | OUTPATIENT
Start: 2020-09-24

## 2020-09-24 RX ORDER — OXYCODONE HYDROCHLORIDE 5 MG/1
5-10 TABLET ORAL
Qty: 30 TABLET | Refills: 0 | Status: SHIPPED | OUTPATIENT
Start: 2020-09-24 | End: 2020-12-09

## 2020-09-24 RX ORDER — IBUPROFEN 800 MG/1
800 TABLET, FILM COATED ORAL EVERY 8 HOURS PRN
Qty: 90 TABLET | Refills: 0 | Status: SHIPPED | OUTPATIENT
Start: 2020-09-24

## 2020-09-24 RX ORDER — ACETAMINOPHEN 325 MG/1
650 TABLET ORAL EVERY 6 HOURS PRN
COMMUNITY
Start: 2020-09-24

## 2020-09-24 RX ADMIN — ACETAMINOPHEN 650 MG: 325 TABLET, FILM COATED ORAL at 08:07

## 2020-09-24 RX ADMIN — PANTOPRAZOLE SODIUM 20 MG: 20 TABLET, DELAYED RELEASE ORAL at 08:07

## 2020-09-24 RX ADMIN — KETOROLAC TROMETHAMINE 30 MG: 30 INJECTION, SOLUTION INTRAMUSCULAR at 02:40

## 2020-09-24 RX ADMIN — KETOROLAC TROMETHAMINE 30 MG: 30 INJECTION, SOLUTION INTRAMUSCULAR at 09:21

## 2020-09-24 NOTE — DISCHARGE SUMMARY
"Discharge Summary    Lashae Middleton MRN# [id number]   YOB: 1975 Age: 45 year old     Date of Admission:  9/23/2020  Date of Discharge:  9/24/2020  Admitting Physician:  Tirso Gomez MD  Discharge Physician:  Jeanie Kaur NP  Discharging Service:  Obstetrics and Gynecology     Primary Provider: Melyssa Lewis          Admission Diagnoses:   Pelvic pain in female [R10.2]  Abnormal uterine bleeding [N93.9]          Discharge Diagnosis:   Patient Active Problem List   Diagnosis     Abnormal uterine bleeding--S/P ENDOMETRIAL ABLATION, no  bleeding since--10/2017     Depression, recurrent (H)     Hx of antibiotic allergy    PCN,  SULFA     Migraine syndrome     Obesity, Class II, BMI 35-39.9     TMJ (temporomandibular joint syndrome)     Abdominal pain, epigastric-REFLUX-Protonix-8/2018     Vaginal discharge with odor--9/2018     Irritable bowel syndrome with both constipation and diarrhea-LACTOSE iintolerant--8/2018     Pelvic pain in female     Urinary frequency--UTI--9/2018     Dyspareunia, female-after--9/2018     S/P endometrial ablation     Abnormal uterine bleeding (AUB)     S/P hysterectomy                Discharge Disposition:   Discharged to home           Condition on Discharge:   Discharge condition: Stable   Discharge vitals: Blood pressure 112/58, pulse 87, temperature 98.2  F (36.8  C), temperature source Oral, resp. rate 16, height 1.626 m (5' 4\"), weight 98.9 kg (218 lb), SpO2 95 %, not currently breastfeeding.   Code status on discharge: Full Code           Procedures / Labs / Imaging:   Invasive procedures: LAVH, general anesthesia, cystoscopy, linares, IV          Medications Prior to Admission:     Medications Prior to Admission   Medication Sig Dispense Refill Last Dose     pantoprazole 20 MG PO EC tablet TAKE 1 TABLET BY MOUTH ONCE DAILY 30-60  MINUTES  BEFORE  A  MEAL 60 tablet 0 9/22/2020 at hs     rizatriptan (MAXALT) 5 MG tablet TAKE 1 TO 2 TABLETS BY MOUTH AT ONSET OF HEADACHE " FOR MIGRAINE. MAY REPEAT IN 2 HOURS. MAX OF 6 TABLETS IN 24 HOURS 30 tablet 1 Past Month at Unknown time     albuterol (PROAIR HFA/PROVENTIL HFA/VENTOLIN HFA) 108 (90 Base) MCG/ACT inhaler Inhale 2 puffs into the lungs every 6 hours as needed for shortness of breath / dyspnea or wheezing 1 Inhaler 3 More than a month at Unknown time     buPROPion (WELLBUTRIN XL) 150 MG 24 hr tablet TAKE 1 TABLET BY MOUTH ONCE DAILY IN THE MORNING 30 tablet 0 More than a month at Unknown time     buPROPion (WELLBUTRIN XL) 300 MG 24 hr tablet Take 1 tablet (300 mg) by mouth every morning 90 tablet 3 More than a month at Unknown time     EPINEPHrine (EPIPEN/ADRENACLICK/OR ANY BX GENERIC EQUIV) 0.3 MG/0.3ML injection 2-pack Inject 0.3 mLs (0.3 mg) into the muscle as needed for anaphylaxis 0.6 mL 0      fluticasone (FLONASE) 50 MCG/ACT nasal spray Spray 1-2 sprays into both nostrils daily 11.1 mL 3 More than a month at Unknown time     [DISCONTINUED] buPROPion (WELLBUTRIN XL) 150 MG 24 hr tablet Take 2 tablets (300 mg) by mouth every morning 180 tablet 0 More than a month at Unknown time             Discharge Medications:     Current Discharge Medication List      START taking these medications    Details   acetaminophen (TYLENOL) 325 MG tablet Take 2 tablets (650 mg) by mouth every 6 hours as needed for mild pain  Qty:      Associated Diagnoses: S/P hysterectomy      ibuprofen (ADVIL/MOTRIN) 800 MG tablet Take 1 tablet (800 mg) by mouth every 8 hours as needed for other (inflammatory pain)  Qty: 90 tablet, Refills: 0    Associated Diagnoses: S/P hysterectomy      oxyCODONE (ROXICODONE) 5 MG tablet Take 1-2 tablets (5-10 mg) by mouth every 3 hours as needed for severe pain  Qty: 30 tablet, Refills: 0    Associated Diagnoses: S/P hysterectomy         CONTINUE these medications which have NOT CHANGED    Details   pantoprazole 20 MG PO EC tablet TAKE 1 TABLET BY MOUTH ONCE DAILY 30-60  MINUTES  BEFORE  A  MEAL  Qty: 60 tablet, Refills: 0     Associated Diagnoses: Abdominal pain, epigastric      rizatriptan (MAXALT) 5 MG tablet TAKE 1 TO 2 TABLETS BY MOUTH AT ONSET OF HEADACHE FOR MIGRAINE. MAY REPEAT IN 2 HOURS. MAX OF 6 TABLETS IN 24 HOURS  Qty: 30 tablet, Refills: 1    Associated Diagnoses: Migraine syndrome      albuterol (PROAIR HFA/PROVENTIL HFA/VENTOLIN HFA) 108 (90 Base) MCG/ACT inhaler Inhale 2 puffs into the lungs every 6 hours as needed for shortness of breath / dyspnea or wheezing  Qty: 1 Inhaler, Refills: 3    Associated Diagnoses: Mild intermittent asthma without complication      !! buPROPion (WELLBUTRIN XL) 150 MG 24 hr tablet TAKE 1 TABLET BY MOUTH ONCE DAILY IN THE MORNING  Qty: 30 tablet, Refills: 0    Associated Diagnoses: Depression, recurrent (H)      !! buPROPion (WELLBUTRIN XL) 300 MG 24 hr tablet Take 1 tablet (300 mg) by mouth every morning  Qty: 90 tablet, Refills: 3    Associated Diagnoses: Depression, recurrent (H); Tobacco abuse      EPINEPHrine (EPIPEN/ADRENACLICK/OR ANY BX GENERIC EQUIV) 0.3 MG/0.3ML injection 2-pack Inject 0.3 mLs (0.3 mg) into the muscle as needed for anaphylaxis  Qty: 0.6 mL, Refills: 0    Associated Diagnoses: History of bee sting allergy      fluticasone (FLONASE) 50 MCG/ACT nasal spray Spray 1-2 sprays into both nostrils daily  Qty: 11.1 mL, Refills: 3    Associated Diagnoses: Sinusitis, unspecified chronicity, unspecified location       !! - Potential duplicate medications found. Please discuss with provider.                Consultations:   No consultations were requested during this admission             Brief History of Illness:   Lashae Middleton is a 45 year old female who was admitted for elective [facility] for ongoing AUB and pelvic pain.  Uncomplicated surgical and post op course.  Discharged to home on PO day #1.          Hospital Course:     Abnormal uterine bleeding--S/P ENDOMETRIAL ABLATION, no  bleeding since--10/2017    Pelvic pain in female    S/P hysterectomy    * No resolved hospital  problems. *                 Significant Results:   None             Pending Results:   None           Discharge Instructions and Follow-Up:   Discharge diet: Regular   Discharge activity: No lifting, driving, or strenuous exercise for 4-6 week(s)  No driving or operating machinery while on narcotic analgesics  Pelvic rest: abstain from intercourse and do not use tampons for 4-6 week(s)   Discharge follow-up: Follow up with me in 4 weeks   Wound care: None   Other instructions: None

## 2020-09-24 NOTE — PLAN OF CARE
Patient discharged at 0946 AM via ambulation accompanied staff. Prescriptions sent to patients preferred pharmacy. All belongings sent with patient.   Discharge instructions reviewed with pt. Belongings gathered and returned to patient.  Patient discharged to home  Core Measures and Vaccines  Core Measures applicable during stay: No.  Pneumonia and Influenza given prior to discharge, if indicated: No    Surgical Patient   Surgical Procedures during stay: yes  Did patient receive discharge instruction on wound care and recognition of infection symptoms? Yes    MISC  Follow up appointment made:  Yes  Home and hospital aquired medications returned to patient: N/A  Patient reports pain was well managed at discharge: Yes

## 2020-09-24 NOTE — PLAN OF CARE
Temp: 98.2  F (36.8  C) Temp src: Oral BP: 112/58 Pulse: 87   Resp: 16 SpO2: 95 % O2 Device: None (Room air)   Pt A&O, makes needs known. Pain 5/10, see MAR for interventions, pt able to sleep comfortably this shift. Issa discontinued this shift, pt voids spontaneously. Sx sites without drainage. IV SL locked. See flowsheets for assessment data. Call light within reach

## 2020-09-24 NOTE — PLAN OF CARE
Face to face report given with opportunity to observe patient.    Report given to Piedad Ríos RN   9/24/2020  7:19 AM

## 2020-09-24 NOTE — PLAN OF CARE
VS & assessment as charted. Pt. Rates pain 3/10 lower abdominal cramping/aching, treated with meds as documentd on MAR. X3 trocar sites to abdomen intact with adhesive bandage, no s/sx of infection.  No vaginal bleeding at this time.  Voiding without difficulty, IV dc'd no s/sx of infection. Pt. Ready for discharge.

## 2020-09-24 NOTE — PLAN OF CARE
Face to face report given with opportunity to observe patient.  Report given to Fiorella CANTOR RN.    FERNANDO SHOEMAKER RN  9/23/2020, 7:16 PM

## 2020-09-24 NOTE — PROGRESS NOTES
Subjective Finding:    Chief compalint: Patient presents with:  Back Pain  Neck Pain  , Pain Scale: 6/10, Intensity: sharp, Duration: 2 months, Radiating:  no.    Date of injury:     Activities that the pain restricts:   Home/household/hobbies/social activities: yes.  Work duties: no.  Sleep: no.  Makes symptoms better: rest.  Makes symptoms worse: activity.  Have you seen anyone else for the symptoms? No.  Work related: no.  Automobile related injury: no.    Objective and Assessment:    Posture Analysis:   High shoulder: .  Head tilt: .  High iliac crest: .  Head carriage: neutral.  Thoracic Kyphosis: neutral.  Lumbar Lordosis: forward.    Lumbar Range of Motion: extension decreased.  Cervical Range of Motion: extension decreased.  Thoracic Range of Motion: extension decreased.  Extremity Range of Motion: .    Palpation:   Quad lumb: bilateral, referred pain: no  Lev scapulae: stiff, referred pain: no    Segmental dysfunction pre-treatment and treatment area: C2, C3, T4, L5 and Sacrum.    Assessment post-treatment:  Cervical: ROM increased.  Thoracic: ROM increased.  Lumbar: ROM increased.    Comments: .      Complicating Factors: .    Procedure(s):  [facility]:  [address] Chiropractic manipulative treatment 3-4 regions performed   Cervical: Diversified, See above for level, Supine, Thoracic: Diversified, See above for level, Prone and Lumbar: Diversified, See above for level, Side posture    Modalities:  None performed this visit    Therapeutic procedures:  None    Plan:  Treatment plan: PRN.  Instructed patient: stretch as instructed at visit.  Short term goals: increase ROM.  Long term goals: restore normal function.  Prognosis: very good.

## 2020-09-24 NOTE — DISCHARGE INSTRUCTIONS
No heavy lifting greater than 15lb for 4 weeks  No driving while on pain meds  Pelvic rest for 4 weeks  No vigorous activity, exercise, swim, bath for 4 weeks  Schedule Postop check Dr. Gomez 4 weeks  Call Dr. Gomez [phone] as necessary if problems in interim

## 2020-09-24 NOTE — TELEPHONE ENCOUNTER
Wellbutrin was adjusted on 9/12 per insurance recommendations. I do not see a refusal for the maxalt.     Could we sent to PA dept for review?    Melyssa Lewis MD

## 2020-09-25 ENCOUNTER — MYC MEDICAL ADVICE (OUTPATIENT)
Dept: OBGYN | Facility: OTHER | Age: 45
End: 2020-09-25

## 2020-09-26 ENCOUNTER — MYC MEDICAL ADVICE (OUTPATIENT)
Dept: FAMILY MEDICINE | Facility: OTHER | Age: 45
End: 2020-09-26

## 2020-09-26 DIAGNOSIS — J32.9 SINUSITIS, UNSPECIFIED CHRONICITY, UNSPECIFIED LOCATION: ICD-10-CM

## 2020-09-26 DIAGNOSIS — G43.909 MIGRAINE SYNDROME: ICD-10-CM

## 2020-09-28 RX ORDER — FLUTICASONE PROPIONATE 50 MCG
1-2 SPRAY, SUSPENSION (ML) NASAL DAILY
Qty: 11.1 ML | Refills: 3 | Status: SHIPPED | OUTPATIENT
Start: 2020-09-28

## 2020-09-28 RX ORDER — RIZATRIPTAN BENZOATE 5 MG/1
TABLET ORAL
Qty: 30 TABLET | Refills: 1 | Status: SHIPPED | OUTPATIENT
Start: 2020-09-28 | End: 2020-12-09

## 2020-09-29 ENCOUNTER — TELEPHONE (OUTPATIENT)
Dept: OBGYN | Facility: OTHER | Age: 45
End: 2020-09-29

## 2020-09-29 DIAGNOSIS — R14.1 GAS PAIN: Primary | ICD-10-CM

## 2020-09-29 RX ORDER — SIMETHICONE 80 MG
160 TABLET,CHEWABLE ORAL EVERY 6 HOURS PRN
Qty: 60 TABLET | Refills: 1 | Status: SHIPPED | OUTPATIENT
Start: 2020-09-29 | End: 2020-12-09

## 2020-09-29 NOTE — TELEPHONE ENCOUNTER
Pt had LAVH on 9/23/20. States she still is having gas pains in her shoulders today, up to a 4 or 5 on pain scale. Denies N& V, bowels and bladder moving. States she is moving around and drinking a lot of fluids. Please advise

## 2020-09-29 NOTE — TELEPHONE ENCOUNTER
I put in a prescription to [facility] for simethicone, 4x daily for gas pains as needed. .  Would also recommend she take  Or make sure she is taking her Protonix.  Continue with walking, cactivity.  High fiber foods may make her more gassy.   Call if N/V, fever, increasing abdominal pain, constipation, or other new or worsening symptoms.

## 2020-09-30 NOTE — TELEPHONE ENCOUNTER
I called [facility] to inquire about the Maxalt. This was covered and patient has a copay of $7. If patient didn't already pick this up it is available for her.

## 2020-10-01 ENCOUNTER — MYC MEDICAL ADVICE (OUTPATIENT)
Dept: OBGYN | Facility: OTHER | Age: 45
End: 2020-10-01

## 2020-10-02 ENCOUNTER — MYC MEDICAL ADVICE (OUTPATIENT)
Dept: OBGYN | Facility: OTHER | Age: 45
End: 2020-10-02

## 2020-10-02 LAB — COPATH REPORT: NORMAL

## 2020-10-05 ENCOUNTER — MYC MEDICAL ADVICE (OUTPATIENT)
Dept: OBGYN | Facility: OTHER | Age: 45
End: 2020-10-05

## 2020-10-06 ENCOUNTER — MYC MEDICAL ADVICE (OUTPATIENT)
Dept: OBGYN | Facility: OTHER | Age: 45
End: 2020-10-06

## 2020-10-08 ENCOUNTER — MYC MEDICAL ADVICE (OUTPATIENT)
Dept: OBGYN | Facility: OTHER | Age: 45
End: 2020-10-08

## 2020-10-22 ENCOUNTER — OFFICE VISIT (OUTPATIENT)
Dept: OBGYN | Facility: OTHER | Age: 45
End: 2020-10-22
Attending: OBSTETRICS & GYNECOLOGY
Payer: COMMERCIAL

## 2020-10-22 VITALS
DIASTOLIC BLOOD PRESSURE: 72 MMHG | HEART RATE: 64 BPM | SYSTOLIC BLOOD PRESSURE: 102 MMHG | BODY MASS INDEX: 38.58 KG/M2 | HEIGHT: 64 IN | TEMPERATURE: 96.6 F | WEIGHT: 226 LBS

## 2020-10-22 DIAGNOSIS — N30.00 ACUTE CYSTITIS WITHOUT HEMATURIA: Primary | ICD-10-CM

## 2020-10-22 DIAGNOSIS — R82.90 ABNORMAL URINE FINDINGS: ICD-10-CM

## 2020-10-22 DIAGNOSIS — Z98.890 POST-OPERATIVE STATE: ICD-10-CM

## 2020-10-22 DIAGNOSIS — R32 URINARY INCONTINENCE, UNSPECIFIED TYPE: Primary | ICD-10-CM

## 2020-10-22 LAB
ALBUMIN UR-MCNC: 10 MG/DL
APPEARANCE UR: ABNORMAL
BACTERIA #/AREA URNS HPF: ABNORMAL /HPF
BILIRUB UR QL STRIP: NEGATIVE
COLOR UR AUTO: YELLOW
GLUCOSE UR STRIP-MCNC: NEGATIVE MG/DL
HGB UR QL STRIP: ABNORMAL
HYALINE CASTS #/AREA URNS LPF: 6 /LPF
KETONES UR STRIP-MCNC: NEGATIVE MG/DL
LEUKOCYTE ESTERASE UR QL STRIP: ABNORMAL
MUCOUS THREADS #/AREA URNS LPF: PRESENT /LPF
NITRATE UR QL: NEGATIVE
PH UR STRIP: 5.5 PH (ref 4.7–8)
RBC #/AREA URNS AUTO: 2 /HPF (ref 0–2)
SOURCE: ABNORMAL
SP GR UR STRIP: 1.03 (ref 1–1.03)
SQUAMOUS #/AREA URNS AUTO: 8 /HPF (ref 0–1)
UROBILINOGEN UR STRIP-MCNC: 2 MG/DL (ref 0–2)
WBC #/AREA URNS AUTO: 3 /HPF (ref 0–5)

## 2020-10-22 PROCEDURE — 99024 POSTOP FOLLOW-UP VISIT: CPT | Performed by: OBSTETRICS & GYNECOLOGY

## 2020-10-22 PROCEDURE — 81001 URINALYSIS AUTO W/SCOPE: CPT | Mod: ZL | Performed by: OBSTETRICS & GYNECOLOGY

## 2020-10-22 PROCEDURE — G0463 HOSPITAL OUTPT CLINIC VISIT: HCPCS

## 2020-10-22 PROCEDURE — 87086 URINE CULTURE/COLONY COUNT: CPT | Mod: ZL | Performed by: OBSTETRICS & GYNECOLOGY

## 2020-10-22 RX ORDER — NITROFURANTOIN 25; 75 MG/1; MG/1
100 CAPSULE ORAL 2 TIMES DAILY
Qty: 14 CAPSULE | Refills: 0 | Status: SHIPPED | OUTPATIENT
Start: 2020-10-22 | End: 2020-10-29

## 2020-10-22 ASSESSMENT — MIFFLIN-ST. JEOR: SCORE: 1645.13

## 2020-10-22 ASSESSMENT — PAIN SCALES - GENERAL: PAINLEVEL: MODERATE PAIN (4)

## 2020-10-22 NOTE — NURSING NOTE
"Chief Complaint   Patient presents with     Post-op Visit       Initial /72   Pulse 64   Temp 96.6  F (35.9  C)   Ht 1.626 m (5' 4\")   Wt 102.5 kg (226 lb)   LMP  (LMP Unknown)   BMI 38.79 kg/m   Estimated body mass index is 38.79 kg/m  as calculated from the following:    Height as of this encounter: 1.626 m (5' 4\").    Weight as of this encounter: 102.5 kg (226 lb).  Medication Reconciliation: complete  Rose Dewitt LPN    " Applied

## 2020-10-22 NOTE — PROGRESS NOTES
"DEE Middleton is a 45 year old female presents for post operative check. She is  4  week(s) status post LAVH/BS.  She reports doing well and denies significant pain or bleeding.  Bowel function is satisfactory. Denies incisional problems. Significant findings Adenomyosis.  She has had urinary frequency/urgency with incontinence.  Of note she did have hyperemic bladder mucosa at time of cysto intraoperatively raising the question of IC.      O.  Blood pressure 102/72, pulse 64, temperature 96.6  F (35.9  C), height 1.626 m (5' 4\"), weight 102.5 kg (226 lb), not currently breastfeeding.    Abd: soft, non-tender, non-distended. Incisions clear, dry, and intact without evidence of infection.  Vagina well supported.      A. /P. Satisfactory post-op check.Released from restrictions 10/26.  May resume intercourse 6 weeks PO.   Urgency incontinence:  Check UA to r/o infection.  If continues discussed urologic evaluation given possibility of IC as noted on cysto.     F/u prn problems or at next annual examination.    Tirso Gomez MD  "

## 2020-10-23 LAB
BACTERIA SPEC CULT: NORMAL
SPECIMEN SOURCE: NORMAL

## 2020-11-09 ENCOUNTER — MYC MEDICAL ADVICE (OUTPATIENT)
Dept: FAMILY MEDICINE | Facility: OTHER | Age: 45
End: 2020-11-09

## 2020-11-09 DIAGNOSIS — R10.13 ABDOMINAL PAIN, EPIGASTRIC: ICD-10-CM

## 2020-11-10 RX ORDER — PANTOPRAZOLE SODIUM 20 MG/1
TABLET, DELAYED RELEASE ORAL
Qty: 60 TABLET | Refills: 0 | Status: SHIPPED | OUTPATIENT
Start: 2020-11-10

## 2020-11-13 ENCOUNTER — MYC MEDICAL ADVICE (OUTPATIENT)
Dept: FAMILY MEDICINE | Facility: OTHER | Age: 45
End: 2020-11-13

## 2020-11-19 ENCOUNTER — TELEPHONE (OUTPATIENT)
Dept: FAMILY MEDICINE | Facility: OTHER | Age: 45
End: 2020-11-19

## 2020-11-19 ENCOUNTER — OFFICE VISIT (OUTPATIENT)
Dept: CHIROPRACTIC MEDICINE | Facility: OTHER | Age: 45
End: 2020-11-19
Attending: CHIROPRACTOR
Payer: COMMERCIAL

## 2020-11-19 DIAGNOSIS — M99.01 SEGMENTAL AND SOMATIC DYSFUNCTION OF CERVICAL REGION: ICD-10-CM

## 2020-11-19 DIAGNOSIS — M99.02 SEGMENTAL AND SOMATIC DYSFUNCTION OF THORACIC REGION: ICD-10-CM

## 2020-11-19 DIAGNOSIS — M99.03 SEGMENTAL AND SOMATIC DYSFUNCTION OF LUMBAR REGION: Primary | ICD-10-CM

## 2020-11-19 DIAGNOSIS — M54.50 ACUTE BILATERAL LOW BACK PAIN WITHOUT SCIATICA: ICD-10-CM

## 2020-11-19 PROCEDURE — 98941 CHIROPRACT MANJ 3-4 REGIONS: CPT | Mod: AT | Performed by: CHIROPRACTOR

## 2020-11-19 NOTE — TELEPHONE ENCOUNTER
Prior Authorization Retail Medication Request  [facility] KEY# [id number]    Medication/Dose: PANTOPRAZOLE SODIUM 20MG DR TABLETS  ICD code (if different than what is on RX):    Previously Tried and Failed:    Rationale:      Insurance Name:    Insurance ID:        Pharmacy Information (if different than what is on RX)  Name:  Azeem Castellanos  Phone:  [phone]

## 2020-11-19 NOTE — TELEPHONE ENCOUNTER
Central Prior Authorization Team  Phone: [phone]    PA Initiation    Medication: PANTOPRAZOLE SODIUM 20MG DR TABLETS  Insurance Company: [employer] - Phone [phone] Fax [phone]  Pharmacy Filling the Rx: [facility] [address] [place] [address]   Filling Pharmacy Phone: [phone]  Filling Pharmacy Fax:    Start Date: 11/19/2020

## 2020-11-23 NOTE — PROGRESS NOTES
Subjective Finding:    Chief compalint: Patient presents with:  Back Pain  Neck Pain  , Pain Scale: 6/10, Intensity: sharp, Duration: 2 months, Radiating:  no.    Date of injury:     Activities that the pain restricts:   Home/household/hobbies/social activities: yes.  Work duties: no.  Sleep: no.  Makes symptoms better: rest.  Makes symptoms worse: activity.  Have you seen anyone else for the symptoms? No.  Work related: no.  Automobile related injury: no.    Objective and Assessment:    Posture Analysis:   High shoulder: .  Head tilt: .  High iliac crest: .  Head carriage: neutral.  Thoracic Kyphosis: neutral.  Lumbar Lordosis: forward.    Lumbar Range of Motion: extension decreased.  Cervical Range of Motion: extension decreased.  Thoracic Range of Motion: extension decreased.  Extremity Range of Motion: .    Palpation:   Quad lumb: bilateral, referred pain: no  Lev scapulae: stiff, referred pain: no    Segmental dysfunction pre-treatment and treatment area: C2, C3, T4, L5 and Sacrum.    Assessment post-treatment:  Cervical: ROM increased.  Thoracic: ROM increased.  Lumbar: ROM increased.    Comments: .      Complicating Factors: .    Procedure(s):  [facility]:  52699 Chiropractic manipulative treatment 3-4 regions performed   Cervical: Diversified, See above for level, Supine, Thoracic: Diversified, See above for level, Prone and Lumbar: Diversified, See above for level, Side posture    Modalities:  None performed this visit    Therapeutic procedures:  None    Plan:  Treatment plan: PRN.  Instructed patient: stretch as instructed at visit.  Short term goals: increase ROM.  Long term goals: restore normal function.  Prognosis: very good.

## 2020-11-27 ENCOUNTER — MYC MEDICAL ADVICE (OUTPATIENT)
Dept: FAMILY MEDICINE | Facility: OTHER | Age: 45
End: 2020-11-27

## 2020-12-02 ENCOUNTER — NURSE TRIAGE (OUTPATIENT)
Dept: FAMILY MEDICINE | Facility: OTHER | Age: 45
End: 2020-12-02

## 2020-12-02 NOTE — TELEPHONE ENCOUNTER
Lashae Middleton  Patient Appointment Schedule Request Pool 32 minutes ago (8:10 AM)        Im broke out in my stress hives and isha wants to see me instead of sending steroids in to pharmacy            [place], Julia  Lashae Middleton 48 minutes ago (7:54 AM)           What type of an appointment do you need ?            Lashae Middleton  Patient Appointment Schedule Request Pool 19 hours ago (1:29 PM)        Appointment Request From: aLshae Middleton     With Provider: Melyssa Lewis MD [[facility]]     Preferred Date Range: Any date 12/3/2020 or later     Preferred Times: Any Time     Reason for visit: Request an Appointment     Comments:     LM for call back. Will address above with call back.      Latisha Neil RN

## 2020-12-02 NOTE — TELEPHONE ENCOUNTER
Patient calling back regarding stress rash. Reports the rash is painful, 5/10. Patient is requesting overbook for late this afternoon, after 3, or next Wednesday. Per previous [email or web address] messages, patient is to be seen in clinic for this, but only wants to see Dr. Lewis. Denies difficulty breathing or any other symptoms.   Patient's phone number is [phone], patient states it is ok to leave a detailed message if no answer due to being at work.  Please advise, thank you.

## 2020-12-09 ENCOUNTER — OFFICE VISIT (OUTPATIENT)
Dept: FAMILY MEDICINE | Facility: OTHER | Age: 45
End: 2020-12-09
Attending: FAMILY MEDICINE
Payer: COMMERCIAL

## 2020-12-09 VITALS
HEIGHT: 64 IN | OXYGEN SATURATION: 99 % | RESPIRATION RATE: 16 BRPM | SYSTOLIC BLOOD PRESSURE: 108 MMHG | TEMPERATURE: 98 F | WEIGHT: 228.6 LBS | BODY MASS INDEX: 39.03 KG/M2 | DIASTOLIC BLOOD PRESSURE: 84 MMHG | HEART RATE: 82 BPM

## 2020-12-09 DIAGNOSIS — G43.909 MIGRAINE SYNDROME: Primary | ICD-10-CM

## 2020-12-09 DIAGNOSIS — Z23 NEED FOR PROPHYLACTIC VACCINATION AND INOCULATION AGAINST INFLUENZA: ICD-10-CM

## 2020-12-09 DIAGNOSIS — R21 RASH: ICD-10-CM

## 2020-12-09 DIAGNOSIS — R35.0 URINARY FREQUENCY: ICD-10-CM

## 2020-12-09 DIAGNOSIS — Z23 NEED FOR VACCINATION: ICD-10-CM

## 2020-12-09 LAB
ALBUMIN UR-MCNC: NEGATIVE MG/DL
APPEARANCE UR: CLEAR
BILIRUB UR QL STRIP: NEGATIVE
COLOR UR AUTO: YELLOW
GLUCOSE UR STRIP-MCNC: NEGATIVE MG/DL
HGB UR QL STRIP: NEGATIVE
KETONES UR STRIP-MCNC: NEGATIVE MG/DL
LEUKOCYTE ESTERASE UR QL STRIP: NEGATIVE
NITRATE UR QL: NEGATIVE
PH UR STRIP: 6 PH (ref 4.7–8)
SOURCE: NORMAL
SP GR UR STRIP: 1.03 (ref 1–1.03)
UROBILINOGEN UR STRIP-MCNC: 2 MG/DL (ref 0–2)

## 2020-12-09 PROCEDURE — 90732 PPSV23 VACC 2 YRS+ SUBQ/IM: CPT

## 2020-12-09 PROCEDURE — 81003 URINALYSIS AUTO W/O SCOPE: CPT | Mod: ZL | Performed by: FAMILY MEDICINE

## 2020-12-09 PROCEDURE — G0463 HOSPITAL OUTPT CLINIC VISIT: HCPCS | Mod: 25

## 2020-12-09 PROCEDURE — G0008 ADMIN INFLUENZA VIRUS VAC: HCPCS

## 2020-12-09 PROCEDURE — 99214 OFFICE O/P EST MOD 30 MIN: CPT | Performed by: FAMILY MEDICINE

## 2020-12-09 RX ORDER — CETIRIZINE HYDROCHLORIDE 10 MG/1
10 TABLET ORAL DAILY
Qty: 90 TABLET | Refills: 3 | Status: SHIPPED | OUTPATIENT
Start: 2020-12-09 | End: 2020-12-18

## 2020-12-09 RX ORDER — PREDNISONE 10 MG/1
TABLET ORAL
Qty: 20 TABLET | Refills: 0 | Status: SHIPPED | OUTPATIENT
Start: 2020-12-09

## 2020-12-09 RX ORDER — RIZATRIPTAN BENZOATE 5 MG/1
TABLET ORAL
Qty: 30 TABLET | Refills: 1 | Status: SHIPPED | OUTPATIENT
Start: 2020-12-09

## 2020-12-09 ASSESSMENT — PAIN SCALES - GENERAL: PAINLEVEL: MODERATE PAIN (4)

## 2020-12-09 ASSESSMENT — ANXIETY QUESTIONNAIRES
7. FEELING AFRAID AS IF SOMETHING AWFUL MIGHT HAPPEN: SEVERAL DAYS
2. NOT BEING ABLE TO STOP OR CONTROL WORRYING: SEVERAL DAYS
GAD7 TOTAL SCORE: 7
6. BECOMING EASILY ANNOYED OR IRRITABLE: SEVERAL DAYS
1. FEELING NERVOUS, ANXIOUS, OR ON EDGE: SEVERAL DAYS
4. TROUBLE RELAXING: SEVERAL DAYS
IF YOU CHECKED OFF ANY PROBLEMS ON THIS QUESTIONNAIRE, HOW DIFFICULT HAVE THESE PROBLEMS MADE IT FOR YOU TO DO YOUR WORK, TAKE CARE OF THINGS AT HOME, OR GET ALONG WITH OTHER PEOPLE: SOMEWHAT DIFFICULT
5. BEING SO RESTLESS THAT IT IS HARD TO SIT STILL: SEVERAL DAYS
3. WORRYING TOO MUCH ABOUT DIFFERENT THINGS: SEVERAL DAYS

## 2020-12-09 ASSESSMENT — MIFFLIN-ST. JEOR: SCORE: 1656.92

## 2020-12-09 ASSESSMENT — PATIENT HEALTH QUESTIONNAIRE - PHQ9: SUM OF ALL RESPONSES TO PHQ QUESTIONS 1-9: 9

## 2020-12-09 NOTE — PROGRESS NOTES
Subjective     Lashae Middleton is a 45 year old female who presents to clinic today for the following health issues:    HPI        Rash  Onset/Duration: two weeks ago   Description  Location: Chest, upper arms, back and upper abdomen  Character: blotchy, raised, painful, burning, red - intermittently   Itching: moderate  Intensity:  mild  Progression of Symptoms:  intermittent  Accompanying signs and symptoms:   Fever: no  Body aches or joint pain: no  Sore throat symptoms: no  Recent cold symptoms: no  History:           Previous episodes of similar rash: Yes - ongoing  New exposures:  None  Recent travel: no  Exposure to similar rash: no  Precipitating or alleviating factors: warm baking soda bath- lasts 30  Therapies tried and outcome: hydrocortisone cream -  not effective, topical steroid - Not effective, calamine lotion, Benadryl/diphenhydramine -  not effective, Allegra/fexofenadine -  not effective, Zyrtec/cetirizine -  not effective and Claritin/loratadine -  not effective    Prednisone helpful in the past. Seems to occur when she is stressed. Rubs and picks at the areas. Usually noted only over chest and upper arms.     Genitourinary - Female  Onset/Duration: Six weeks ago - after completion of abx therapy following hysterectomy   Description:   Painful urination (Dysuria): no           Frequency: YES  Blood in urine (Hematuria): no  Delay in urine (Hesitency): no  Intensity: moderate  Progression of Symptoms:  same  Accompanying Signs & Symptoms:  Fever/chills: no  Flank pain: no  Nausea and vomiting: no  Vaginal symptoms: odor  Abdominal/Pelvic Pain: YES- 5/10  History:   History of frequent UTI s: YES- Normally during pregnancy  History of kidney stones: YES- only kidney stone 2018  Sexually Active: no  Possibility of pregnancy: No  Precipitating or alleviating factors: None  Therapies tried and outcome: Cranberry juice prn (contraindicated in Coumadin patients), Increase fluid intake and  " Ineffective     Treated with abx, culture negative. No dysuria, etc. Notes just increase in urinary frequency. Lower abd pain is chronic.     Review of Systems   Constitutional, HEENT, cardiovascular, pulmonary, gi and gu systems are negative, except as otherwise noted.      Objective    /84 (BP Location: Right arm, Patient Position: Sitting, Cuff Size: Adult Large)   Pulse 82   Temp 98  F (36.7  C) (Tympanic)   Resp 16   Ht 1.626 m (5' 4\")   Wt 103.7 kg (228 lb 9.6 oz)   SpO2 99%   BMI 39.24 kg/m    Body mass index is 39.24 kg/m .  Physical Exam   GENERAL: healthy, alert and no distress  NECK: no adenopathy, no asymmetry, masses, or scars and thyroid normal to palpation  RESP: lungs clear to auscultation - no rales, rhonchi or wheezes  CV: regular rate and rhythm, normal S1 S2, no S3 or S4, no murmur, click or rub, no peripheral edema and peripheral pulses strong  ABDOMEN: soft, nontender, no hepatosplenomegaly, no masses and bowel sounds normal  MS: no gross musculoskeletal defects noted, no edema  SKIN: Pt has scattered papular rash over chest and upper shoulders, some excoriation.   PSYCH: mentation appears normal, affect normal/bright    Results for orders placed or performed in visit on 12/09/20   UA reflex to Microscopic and Culture - [place]     Status: None    Specimen: Midstream Urine   Result Value Ref Range    Color Urine Yellow     Appearance Urine Clear     Glucose Urine Negative NEG^Negative mg/dL    Bilirubin Urine Negative NEG^Negative    Ketones Urine Negative NEG^Negative mg/dL    Specific Gravity Urine 1.030 1.003 - 1.035    Blood Urine Negative NEG^Negative    pH Urine 6.0 4.7 - 8.0 pH    Protein Albumin Urine Negative NEG^Negative mg/dL    Urobilinogen mg/dL 2.0 0.0 - 2.0 mg/dL    Nitrite Urine Negative NEG^Negative    Leukocyte Esterase Urine Negative NEG^Negative    Source Midstream Urine            Assessment & Plan     Rash  Unclear cause, pt correlated with high dairy intake " and stress. Prednisone. Start daily zyrtec.   - predniSONE (DELTASONE) 10 MG tablet; Take 3 tabs by mouth daily x 3 days, then 2 tabs daily x 3 days, then 1 tab daily x 3 days, then 1/2 tab daily x 3 days.  - cetirizine (ZYRTEC) 10 MG tablet; Take 1 tablet (10 mg) by mouth daily    Urinary frequency  Unlikely UTI based on symptoms. If UA negative, consider PVR at follow up  - UA reflex to Microscopic and Culture - [place]    Need for vaccination  - Pneumococcal vaccine 23 valent PPSV23  (Pneumovax) [01042]    Need for prophylactic vaccination and inoculation against influenza  - ME FLU VAC PRESRV FREE QUAD SPLIT VIR > 6 MONTHS IM [[id number]]    Migraine syndrome  refilled  - rizatriptan (MAXALT) 5 MG tablet; TAKE 1 TO 2 TABLETS BY MOUTH AT ONSET OF HEADACHE FOR MIGRAINE. MAY REPEAT IN 2 HOURS. MAX OF 6 TABLETS IN 24 HOURS      No follow-ups on file.    Melyssa Lewis MD  [facility] - [place]

## 2020-12-09 NOTE — NURSING NOTE
"Chief Complaint   Patient presents with     Derm Problem       Initial /84 (BP Location: Right arm, Patient Position: Sitting, Cuff Size: Adult Large)   Pulse 82   Temp 98  F (36.7  C) (Tympanic)   Resp 16   Ht 1.626 m (5' 4\")   Wt 103.7 kg (228 lb 9.6 oz)   SpO2 99%   BMI 39.24 kg/m   Estimated body mass index is 39.24 kg/m  as calculated from the following:    Height as of this encounter: 1.626 m (5' 4\").    Weight as of this encounter: 103.7 kg (228 lb 9.6 oz).  Medication Reconciliation: miranda Carter  "

## 2020-12-10 ASSESSMENT — ANXIETY QUESTIONNAIRES: GAD7 TOTAL SCORE: 7

## 2020-12-18 ENCOUNTER — OFFICE VISIT (OUTPATIENT)
Dept: FAMILY MEDICINE | Facility: OTHER | Age: 45
End: 2020-12-18
Attending: FAMILY MEDICINE
Payer: COMMERCIAL

## 2020-12-18 VITALS
HEART RATE: 89 BPM | DIASTOLIC BLOOD PRESSURE: 86 MMHG | WEIGHT: 230 LBS | SYSTOLIC BLOOD PRESSURE: 108 MMHG | OXYGEN SATURATION: 96 % | BODY MASS INDEX: 39.48 KG/M2 | TEMPERATURE: 97.4 F

## 2020-12-18 DIAGNOSIS — N32.81 OAB (OVERACTIVE BLADDER): Primary | ICD-10-CM

## 2020-12-18 DIAGNOSIS — R21 RASH AND NONSPECIFIC SKIN ERUPTION: ICD-10-CM

## 2020-12-18 PROCEDURE — G0463 HOSPITAL OUTPT CLINIC VISIT: HCPCS | Mod: 25

## 2020-12-18 PROCEDURE — 99214 OFFICE O/P EST MOD 30 MIN: CPT | Performed by: FAMILY MEDICINE

## 2020-12-18 PROCEDURE — G0463 HOSPITAL OUTPT CLINIC VISIT: HCPCS

## 2020-12-18 RX ORDER — TOLTERODINE 2 MG/1
2 CAPSULE, EXTENDED RELEASE ORAL DAILY
Qty: 90 CAPSULE | Refills: 0 | Status: SHIPPED | OUTPATIENT
Start: 2020-12-18 | End: 2021-02-26

## 2020-12-18 ASSESSMENT — PAIN SCALES - GENERAL: PAINLEVEL: SEVERE PAIN (6)

## 2020-12-18 NOTE — NURSING NOTE
"Chief Complaint   Patient presents with     Recheck Medication       Initial /86   Pulse 89   Temp 97.4  F (36.3  C) (Tympanic)   Wt 104.3 kg (230 lb)   SpO2 96%   BMI 39.48 kg/m   Estimated body mass index is 39.48 kg/m  as calculated from the following:    Height as of 12/9/20: 1.626 m (5' 4\").    Weight as of this encounter: 104.3 kg (230 lb).  Medication Reconciliation: complete  Candi Muir LPN  "

## 2020-12-18 NOTE — PROGRESS NOTES
Subjective     Lashae Middleton is a 45 year old female who presents to clinic today for the following health issues:    HPI         Concern - follow up in rash  Onset: beginning of december  Description: chest upper arms, back and upper abdomen  Intensity: moderate  Progression of Symptoms:  improving  Accompanying Signs & Symptoms: stress  Previous history of similar problem: yes  Precipitating factors:        Worsened by: stress  Alleviating factors:        Improved by: none  Therapies tried and outcome:hydrocortisone cream -  not effective, topical steroid - Not effective, calamine lotion, Benadryl/diphenhydramine -  not effective, Allegra/fexofenadine -  not effective, Zyrtec/cetirizine -  not effective and Claritin/loratadine -  not effective    *she did not fill the prednisone due to money issues.     Genitourinary - Female  Onset/Duration: since her hysterectomy  Description:   Painful urination (Dysuria): no           Frequency: YES  Blood in urine (Hematuria): no  Delay in urine (Hesitency): no  Intensity: moderate  Progression of Symptoms:  constant  Accompanying Signs & Symptoms:  Fever/chills: no  Flank pain: no  Nausea and vomiting: no  Vaginal symptoms: none  Abdominal/Pelvic Pain: no  History:   History of frequent UTI s: no  History of kidney stones: no  Sexually Active: no  Possibility of pregnancy: No  Precipitating or alleviating factors: None  Therapies tried and outcome:  antibiotics.     Since her hysterectomy several months ago, pt notes urinary frequency without other symptoms. She was treated for UTI, however, sx did not resolved. Seen by me 2 weeks ago and UA clear. She is here to follow up. States she is going to the restroom roughly every 30 min which is frustrating for her at work. Waking several times a night to urinate as well.  No other changes in diet, etc since prior to her surgery. Post op course otherwise uncomplicated.       Review of Systems   Constitutional, HEENT,  cardiovascular, pulmonary, gi and gu systems are negative, except as otherwise noted.      Objective    /86   Pulse 89   Temp 97.4  F (36.3  C) (Tympanic)   Wt 104.3 kg (230 lb)   SpO2 96%   BMI 39.48 kg/m    Body mass index is 39.48 kg/m .  Physical Exam   GENERAL: alert and no distress  NECK: no adenopathy, no asymmetry, masses, or scars and thyroid normal to palpation  RESP: lungs clear to auscultation - no rales, rhonchi or wheezes  CV: regular rates and rhythm, normal S1 S2, no S3 or S4, no murmur, click or rub and no peripheral edema  ABDOMEN: soft, nontender, no hepatosplenomegaly, no masses and bowel sounds normal  SKIN: Continue to note scattered papular rash over chest and upper shoulders, some excoriation.  PSYCH: mentation appears normal, affect normal/bright    PVR 58 ml    Assessment & Plan     OAB (overactive bladder)  Pt is emptying well. Urine does not indicate infection. Trial of detrol. Unclear why this seemed to worsen after hysterectomy.   - tolterodine ER (DETROL LA) 2 MG 24 hr capsule; Take 1 capsule (2 mg) by mouth daily    Rash and nonspecific skin eruption  Pt will fill medications prescribed at our last visit and follow up prn.     Return in about 2 months (around 2/18/2021) for bladder follow up phone.    Melyssa Lewis MD  [facility] - [place]

## 2020-12-21 ENCOUNTER — OFFICE VISIT (OUTPATIENT)
Dept: CHIROPRACTIC MEDICINE | Facility: OTHER | Age: 45
End: 2020-12-21
Attending: CHIROPRACTOR
Payer: COMMERCIAL

## 2020-12-21 DIAGNOSIS — M99.02 SEGMENTAL AND SOMATIC DYSFUNCTION OF THORACIC REGION: ICD-10-CM

## 2020-12-21 DIAGNOSIS — M99.01 SEGMENTAL AND SOMATIC DYSFUNCTION OF CERVICAL REGION: ICD-10-CM

## 2020-12-21 DIAGNOSIS — M54.50 ACUTE BILATERAL LOW BACK PAIN WITHOUT SCIATICA: ICD-10-CM

## 2020-12-21 DIAGNOSIS — M99.03 SEGMENTAL AND SOMATIC DYSFUNCTION OF LUMBAR REGION: Primary | ICD-10-CM

## 2020-12-21 PROCEDURE — 98941 CHIROPRACT MANJ 3-4 REGIONS: CPT | Mod: AT | Performed by: CHIROPRACTOR

## 2020-12-21 NOTE — PROGRESS NOTES
Subjective Finding:    Chief compalint: Patient presents with:  Back Pain  Neck Pain  , Pain Scale: 6/10, Intensity: sharp, Duration: 2 months, Radiating:  no.    Date of injury:     Activities that the pain restricts:   Home/household/hobbies/social activities: yes.  Work duties: no.  Sleep: no.  Makes symptoms better: rest.  Makes symptoms worse: activity.  Have you seen anyone else for the symptoms? No.  Work related: no.  Automobile related injury: no.    Objective and Assessment:    Posture Analysis:   High shoulder: .  Head tilt: .  High iliac crest: .  Head carriage: neutral.  Thoracic Kyphosis: neutral.  Lumbar Lordosis: forward.    Lumbar Range of Motion: extension decreased.  Cervical Range of Motion: extension decreased.  Thoracic Range of Motion: extension decreased.  Extremity Range of Motion: .    Palpation:   Quad lumb: bilateral, referred pain: no  Lev scapulae: stiff, referred pain: no    Segmental dysfunction pre-treatment and treatment area: C2, C3, T4, L5 and Sacrum.    Assessment post-treatment:  Cervical: ROM increased.  Thoracic: ROM increased.  Lumbar: ROM increased.    Comments: .      Complicating Factors: .    Procedure(s):  [facility]:  [address] Chiropractic manipulative treatment 3-4 regions performed   Cervical: Diversified, See above for level, Supine, Thoracic: Diversified, See above for level, Prone and Lumbar: Diversified, See above for level, Side posture    Modalities:  None performed this visit    Therapeutic procedures:  None    Plan:  Treatment plan: PRN.  Instructed patient: stretch as instructed at visit.  Short term goals: increase ROM.  Long term goals: restore normal function.  Prognosis: very good.

## 2021-01-19 ENCOUNTER — MYC MEDICAL ADVICE (OUTPATIENT)
Dept: FAMILY MEDICINE | Facility: OTHER | Age: 46
End: 2021-01-19

## 2021-01-19 NOTE — LETTER
To whom it may concern,     Lashae Middleton ( 1975) is a patient at this clinic and is being treated for an affective disorder that has been longstanding. It has been demonstrated that patients with affective disorders benefit from  animals and it is recommended that she continue to live with her animals for this reason. Please consider her request to keep the animals with her at this time.               Melyssa eLwis MD

## 2021-02-22 NOTE — PROGRESS NOTES
Lashae is a 45 year old who is being evaluated via a billable telephone visit.      What phone number would you like to be contacted at? [phone]  How would you like to obtain your AVS? MyChart    Assessment & Plan     OAB (overactive bladder)   detrol which was prescribed for patient in December. Follow up in 1 month after starting    Rash  Intermittent urticaria. Responds to zyrtec and prednisone, but has recurred several times this year despite daily zyrtec. Referred to derm to see if more definitive dx or treatment available for her.   - DERMATOLOGY ADULT REFERRAL; Future    7 minutes spent on the date of the encounter doing chart review, patient visit and documentation       Melyssa Lewis MD  [facility] - [place]    Subjective   Lashae is a 45 year old who presents for the following health issue:     HPI     Genitourinary - Female  Onset/Duration: several months   Description:   Painful urination (Dysuria): no           Frequency: YES  Blood in urine (Hematuria): no  Delay in urine (Hesitency): no  Intensity: moderate  Progression of Symptoms:  same and constant  Accompanying Signs & Symptoms:  Fever/chills: no  Flank pain: no  Nausea and vomiting: no  Vaginal symptoms: none  Abdominal/Pelvic Pain: YES- intermittent   History:   History of frequent UTI s: no  History of kidney stones: YES  Sexually Active: no  Possibility of pregnancy: No  Precipitating or alleviating factors: None  Therapies tried and outcome:  tolterodine - pt never started this     Rash       Duration:  Patient has had episodes of this same rash for about the last 4 years. Episodes always start on her arms (both) and then moves to back first but do move to her chest as well. At times it moves to legs (inner thighs) also.  not currently flared    Description  Current Location: arms. Chest and back   Itching: mild    Intensity:  moderate    Accompanying signs and symptoms: painful hive like rash with mild itching. Had  similar rash about a month ago which was treated with steroid and abx however it reappeared about a week ago. Notes worsening sx when body temp rises.    History (similar episodes/previous evaluation): yes    Precipitating or alleviating factors:  New exposures:  None  Recent travel: no      Therapies tried and outcome: steroid cream and oral abx, prednisone - effective     Review of Systems   Constitutional, HEENT, cardiovascular, pulmonary, gi and gu systems are negative, except as otherwise noted.      Objective           Vitals:  No vitals were obtained today due to virtual visit.    Physical Exam   healthy, alert and no distress  PSYCH: Alert and oriented times 3; coherent speech, normal   rate and volume, able to articulate logical thoughts, able   to abstract reason, no tangential thoughts, no hallucinations   or delusions  Her affect is normal  RESP: No cough, no audible wheezing, able to talk in full sentences  Remainder of exam unable to be completed due to telephone visits    No results found for any visits on 02/23/21.

## 2021-02-23 ENCOUNTER — VIRTUAL VISIT (OUTPATIENT)
Dept: FAMILY MEDICINE | Facility: OTHER | Age: 46
End: 2021-02-23
Attending: FAMILY MEDICINE
Payer: COMMERCIAL

## 2021-02-23 DIAGNOSIS — R21 RASH: ICD-10-CM

## 2021-02-23 DIAGNOSIS — N32.81 OAB (OVERACTIVE BLADDER): Primary | ICD-10-CM

## 2021-02-23 PROCEDURE — 99213 OFFICE O/P EST LOW 20 MIN: CPT | Mod: 95 | Performed by: FAMILY MEDICINE

## 2021-02-23 RX ORDER — CETIRIZINE HYDROCHLORIDE 10 MG/1
10 TABLET ORAL DAILY
COMMUNITY
Start: 2021-02-10

## 2021-02-26 ENCOUNTER — MYC MEDICAL ADVICE (OUTPATIENT)
Dept: FAMILY MEDICINE | Facility: OTHER | Age: 46
End: 2021-02-26

## 2021-02-26 DIAGNOSIS — N32.81 OAB (OVERACTIVE BLADDER): ICD-10-CM

## 2021-02-26 RX ORDER — TOLTERODINE 2 MG/1
2 CAPSULE, EXTENDED RELEASE ORAL DAILY
Qty: 90 CAPSULE | Refills: 0 | Status: SHIPPED | OUTPATIENT
Start: 2021-02-26

## 2021-03-07 ENCOUNTER — HEALTH MAINTENANCE LETTER (OUTPATIENT)
Age: 46
End: 2021-03-07

## 2021-04-25 ENCOUNTER — HEALTH MAINTENANCE LETTER (OUTPATIENT)
Age: 46
End: 2021-04-25

## 2021-10-10 ENCOUNTER — HEALTH MAINTENANCE LETTER (OUTPATIENT)
Age: 46
End: 2021-10-10

## 2022-03-26 ENCOUNTER — HEALTH MAINTENANCE LETTER (OUTPATIENT)
Age: 47
End: 2022-03-26

## 2022-05-21 ENCOUNTER — HEALTH MAINTENANCE LETTER (OUTPATIENT)
Age: 47
End: 2022-05-21

## 2022-09-18 ENCOUNTER — HEALTH MAINTENANCE LETTER (OUTPATIENT)
Age: 47
End: 2022-09-18

## 2023-04-10 NOTE — TELEPHONE ENCOUNTER
No pa needed- This medication is covered without needing a pa. The pharmacy already received a paid claim.     No

## 2023-05-06 ENCOUNTER — HEALTH MAINTENANCE LETTER (OUTPATIENT)
Age: 48
End: 2023-05-06

## 2023-06-04 ENCOUNTER — HEALTH MAINTENANCE LETTER (OUTPATIENT)
Age: 48
End: 2023-06-04

## 2023-06-15 NOTE — TELEPHONE ENCOUNTER
Patient is still waiting for a phone call for her abdomin pain.      Please call [phone] & please leave her a message    [Feeling Poorly] : not feeling poorly [Feeling Tired] : not feeling tired [Eyesight Problems] : no eyesight problems [Nosebleeds] : no nosebleeds [Chest Pain] : no chest pain [Shortness Of Breath] : no shortness of breath [Abdominal Pain] : no abdominal pain [Vomiting] : no vomiting [Dysuria] : no dysuria [Joint Swelling] : no joint swelling [Dizziness] : no dizziness

## 2023-11-27 NOTE — TELEPHONE ENCOUNTER
Call placed to [facility] Clonidine, Focalin and Remeron sent 11/22/23. Focalin XR 25mg sold 11/22/23.    Rx's reloaded with Pt preferred pharmacy [facility]. Routing to MD. Parent updated in [facility] message.   Received referral for possible [facility] services from patient's primary, Dr. Lewis.  Attempted to reach patient, but was unsuccessful.  Plan to attempt again.     Candi Gallegos [facility]  Social Work Care Coordinator  [facility]   [phone] or [phone]

## (undated) DEVICE — DRAPE-THREE QUARTER (LARGE) SHEET

## (undated) DEVICE — SUTURE-VICRYL 2-0 CT-1 J945H

## (undated) DEVICE — SCD SLEEVE-KNEE REG.

## (undated) DEVICE — BLADE-SCALPEL #10

## (undated) DEVICE — PACK-LAP LAVH-CUSTOM

## (undated) DEVICE — CAUTERY PAD-POLYHESIVE II ADULT

## (undated) DEVICE — TUBING-SUCTION 20FT

## (undated) DEVICE — POUCH-INSTRUMENT 2 COMP. 7 X 11IN

## (undated) DEVICE — GLV-8.5 BIOGEL LATEX

## (undated) DEVICE — LIGASURE-IMPACT SEALER/DIVIDER

## (undated) DEVICE — APPLICATOR-CHLORAPREP 26ML TINTED CHG 2%+ 70% IPA-SURGICAL

## (undated) DEVICE — SET-TUR Y-TYPE BLADDER IRRIGATION

## (undated) DEVICE — CATH TRAY-16FR METER W/STATLOCK LATEX]

## (undated) DEVICE — BLADE-SURG CLIPPER

## (undated) DEVICE — CLEARIFY VISUALIZATION SYSTEM (SCOPE WARMER)

## (undated) DEVICE — NDL-SPINAL 18G X 5.5"

## (undated) DEVICE — CORD-LAPAROSCOPIC MONOPOLAR-DISPOSABLE

## (undated) DEVICE — TOPICAL SKIN ADHESIVE EXOFIN

## (undated) DEVICE — SUTURE-VICRYL 3-0 CT-2 J232H

## (undated) DEVICE — BIN-UROLOGY / CYSTO

## (undated) DEVICE — GLV-7.0 PROTEXIS PI CLASSIC LF/PF

## (undated) DEVICE — PRESSURE INFUSOR DISP. 3000CC

## (undated) DEVICE — TUBING-INSUFFLATION/LAPAROFLATOR W/FILTER

## (undated) DEVICE — SUCTION-IRRIGATION STRYKEFLOW II (STRYKER)

## (undated) DEVICE — SOL-NACL 0.9% 1000ML

## (undated) DEVICE — BLANKET-BAIR UPPER BODY

## (undated) DEVICE — SUTURE-VICRYL 0 CT-1 J346H

## (undated) DEVICE — TUBING-SEECLEAR LAPAROSCOPIC SMOKE EVACUATION

## (undated) DEVICE — NDL-INSUFFLATION 120MM

## (undated) DEVICE — TRAY-SKIN PREP POVIDONE/IODINE

## (undated) DEVICE — IRRIGATION-NACL 3000ML (BAG)

## (undated) DEVICE — SPONGE-LAPAROTOMY PADS 18 X 18

## (undated) DEVICE — LIGHT HANDLE COVER

## (undated) DEVICE — TROCAR-5X100MM BLADED W/FIXATION

## (undated) DEVICE — TROCAR SLEEVE-KII 5X100MM

## (undated) DEVICE — IRRIGATION-H2O 1000ML

## (undated) DEVICE — PACK-BASIN SET-UP

## (undated) DEVICE — LABEL-STERILE PREPRINTED FOR OR

## (undated) DEVICE — SUTURE-VICRYL 0 UR-6 J603H

## (undated) DEVICE — SYRINGE-10CC LUER LOCK

## (undated) DEVICE — IRRIGATION-NACL 1000ML

## (undated) DEVICE — SANITARY NAPKINS-SINGLE

## (undated) DEVICE — SUTURE-VICRYL 0 CT-2 POP-OFF J727D

## (undated) DEVICE — CATH-FOLEY-2 WAY 14FR-5CC

## (undated) DEVICE — LIGASURE-5MM BLUNT TIP LAPAROSCOPIC

## (undated) DEVICE — CATH-URETHRAL 14FR

## (undated) DEVICE — CANISTER-SUCTION 2000CC

## (undated) RX ORDER — GLYCOPYRROLATE 0.2 MG/ML
INJECTION, SOLUTION INTRAMUSCULAR; INTRAVENOUS
Status: DISPENSED
Start: 2020-09-23

## (undated) RX ORDER — NEOSTIGMINE METHYLSULFATE 1 MG/ML
VIAL (ML) INJECTION
Status: DISPENSED
Start: 2020-09-23

## (undated) RX ORDER — FENTANYL CITRATE 50 UG/ML
INJECTION, SOLUTION INTRAMUSCULAR; INTRAVENOUS
Status: DISPENSED
Start: 2020-09-23

## (undated) RX ORDER — KETAMINE HCL IN NACL, ISO-OSM 100MG/10ML
SYRINGE (ML) INJECTION
Status: DISPENSED
Start: 2020-09-23

## (undated) RX ORDER — PROPOFOL 10 MG/ML
INJECTION, EMULSION INTRAVENOUS
Status: DISPENSED
Start: 2020-09-23

## (undated) RX ORDER — KETOROLAC TROMETHAMINE 30 MG/ML
INJECTION, SOLUTION INTRAMUSCULAR; INTRAVENOUS
Status: DISPENSED
Start: 2020-09-23

## (undated) RX ORDER — DEXAMETHASONE SODIUM PHOSPHATE 10 MG/ML
INJECTION, SOLUTION INTRAMUSCULAR; INTRAVENOUS
Status: DISPENSED
Start: 2020-09-23

## (undated) RX ORDER — LIDOCAINE HYDROCHLORIDE 20 MG/ML
INJECTION, SOLUTION EPIDURAL; INFILTRATION; INTRACAUDAL; PERINEURAL
Status: DISPENSED
Start: 2020-09-23

## (undated) RX ORDER — ONDANSETRON 2 MG/ML
INJECTION INTRAMUSCULAR; INTRAVENOUS
Status: DISPENSED
Start: 2020-09-23